# Patient Record
Sex: FEMALE | NOT HISPANIC OR LATINO | Employment: OTHER | URBAN - METROPOLITAN AREA
[De-identification: names, ages, dates, MRNs, and addresses within clinical notes are randomized per-mention and may not be internally consistent; named-entity substitution may affect disease eponyms.]

---

## 2024-03-31 ENCOUNTER — APPOINTMENT (OUTPATIENT)
Dept: CT IMAGING | Facility: CLINIC | Age: 64
End: 2024-03-31
Attending: PHYSICIAN ASSISTANT
Payer: COMMERCIAL

## 2024-03-31 ENCOUNTER — HOSPITAL ENCOUNTER (EMERGENCY)
Facility: CLINIC | Age: 64
Discharge: HOME | End: 2024-03-31
Attending: PHYSICIAN ASSISTANT | Admitting: PHYSICIAN ASSISTANT
Payer: COMMERCIAL

## 2024-03-31 VITALS
OXYGEN SATURATION: 98 % | RESPIRATION RATE: 18 BRPM | HEART RATE: 113 BPM | DIASTOLIC BLOOD PRESSURE: 108 MMHG | SYSTOLIC BLOOD PRESSURE: 175 MMHG | TEMPERATURE: 97.6 F | WEIGHT: 150 LBS

## 2024-03-31 DIAGNOSIS — N90.89 VULVAR LESION: ICD-10-CM

## 2024-03-31 LAB
ALBUMIN SERPL BCG-MCNC: 4.7 G/DL (ref 3.5–5.2)
ALP SERPL-CCNC: 81 U/L (ref 40–150)
ALT SERPL W P-5'-P-CCNC: 20 U/L (ref 0–50)
ANION GAP SERPL CALCULATED.3IONS-SCNC: 15 MMOL/L (ref 7–15)
AST SERPL W P-5'-P-CCNC: 30 U/L (ref 0–45)
BASOPHILS # BLD AUTO: 0.1 10E3/UL (ref 0–0.2)
BASOPHILS NFR BLD AUTO: 1 %
BILIRUB SERPL-MCNC: 0.5 MG/DL
BUN SERPL-MCNC: 12.4 MG/DL (ref 8–23)
CALCIUM SERPL-MCNC: 9.1 MG/DL (ref 8.8–10.2)
CHLORIDE SERPL-SCNC: 95 MMOL/L (ref 98–107)
CLUE CELLS: ABNORMAL
CREAT SERPL-MCNC: 0.64 MG/DL (ref 0.51–0.95)
DEPRECATED HCO3 PLAS-SCNC: 28 MMOL/L (ref 22–29)
EGFRCR SERPLBLD CKD-EPI 2021: >90 ML/MIN/1.73M2
EOSINOPHIL # BLD AUTO: 0.1 10E3/UL (ref 0–0.7)
EOSINOPHIL NFR BLD AUTO: 1 %
ERYTHROCYTE [DISTWIDTH] IN BLOOD BY AUTOMATED COUNT: 11.9 % (ref 10–15)
GLUCOSE SERPL-MCNC: 99 MG/DL (ref 70–99)
HCT VFR BLD AUTO: 45.7 % (ref 35–47)
HGB BLD-MCNC: 15.4 G/DL (ref 11.7–15.7)
IMM GRANULOCYTES # BLD: 0 10E3/UL
IMM GRANULOCYTES NFR BLD: 1 %
LYMPHOCYTES # BLD AUTO: 1.7 10E3/UL (ref 0.8–5.3)
LYMPHOCYTES NFR BLD AUTO: 22 %
MCH RBC QN AUTO: 33.2 PG (ref 26.5–33)
MCHC RBC AUTO-ENTMCNC: 33.7 G/DL (ref 31.5–36.5)
MCV RBC AUTO: 99 FL (ref 78–100)
MONOCYTES # BLD AUTO: 0.6 10E3/UL (ref 0–1.3)
MONOCYTES NFR BLD AUTO: 7 %
NEUTROPHILS # BLD AUTO: 5.3 10E3/UL (ref 1.6–8.3)
NEUTROPHILS NFR BLD AUTO: 69 %
NRBC # BLD AUTO: 0 10E3/UL
NRBC BLD AUTO-RTO: 0 /100
PLATELET # BLD AUTO: 253 10E3/UL (ref 150–450)
POTASSIUM SERPL-SCNC: 3.7 MMOL/L (ref 3.4–5.3)
PROT SERPL-MCNC: 7.9 G/DL (ref 6.4–8.3)
RBC # BLD AUTO: 4.64 10E6/UL (ref 3.8–5.2)
SODIUM SERPL-SCNC: 138 MMOL/L (ref 135–145)
TRICHOMONAS, WET PREP: ABNORMAL
WBC # BLD AUTO: 7.7 10E3/UL (ref 4–11)
WBC'S/HIGH POWER FIELD, WET PREP: ABNORMAL
YEAST, WET PREP: ABNORMAL

## 2024-03-31 PROCEDURE — 71260 CT THORAX DX C+: CPT

## 2024-03-31 PROCEDURE — 87210 SMEAR WET MOUNT SALINE/INK: CPT | Performed by: PHYSICIAN ASSISTANT

## 2024-03-31 PROCEDURE — 250N000009 HC RX 250: Performed by: PHYSICIAN ASSISTANT

## 2024-03-31 PROCEDURE — 250N000011 HC RX IP 250 OP 636: Performed by: PHYSICIAN ASSISTANT

## 2024-03-31 PROCEDURE — 85025 COMPLETE CBC W/AUTO DIFF WBC: CPT | Performed by: PHYSICIAN ASSISTANT

## 2024-03-31 PROCEDURE — 36415 COLL VENOUS BLD VENIPUNCTURE: CPT | Performed by: PHYSICIAN ASSISTANT

## 2024-03-31 PROCEDURE — 80053 COMPREHEN METABOLIC PANEL: CPT | Performed by: PHYSICIAN ASSISTANT

## 2024-03-31 PROCEDURE — 99284 EMERGENCY DEPT VISIT MOD MDM: CPT | Performed by: PHYSICIAN ASSISTANT

## 2024-03-31 PROCEDURE — 99285 EMERGENCY DEPT VISIT HI MDM: CPT | Mod: 25 | Performed by: PHYSICIAN ASSISTANT

## 2024-03-31 RX ORDER — IOPAMIDOL 755 MG/ML
500 INJECTION, SOLUTION INTRAVASCULAR ONCE
Status: COMPLETED | OUTPATIENT
Start: 2024-03-31 | End: 2024-03-31

## 2024-03-31 RX ADMIN — IOPAMIDOL 75 ML: 755 INJECTION, SOLUTION INTRAVENOUS at 12:42

## 2024-03-31 RX ADMIN — SODIUM CHLORIDE 60 ML: 9 INJECTION, SOLUTION INTRAVENOUS at 12:42

## 2024-03-31 ASSESSMENT — COLUMBIA-SUICIDE SEVERITY RATING SCALE - C-SSRS
1. IN THE PAST MONTH, HAVE YOU WISHED YOU WERE DEAD OR WISHED YOU COULD GO TO SLEEP AND NOT WAKE UP?: NO
2. HAVE YOU ACTUALLY HAD ANY THOUGHTS OF KILLING YOURSELF IN THE PAST MONTH?: NO
6. HAVE YOU EVER DONE ANYTHING, STARTED TO DO ANYTHING, OR PREPARED TO DO ANYTHING TO END YOUR LIFE?: NO

## 2024-03-31 ASSESSMENT — ACTIVITIES OF DAILY LIVING (ADL)
ADLS_ACUITY_SCORE: 35
ADLS_ACUITY_SCORE: 33

## 2024-03-31 NOTE — ED TRIAGE NOTES
Pt c/o malodorous, dark yellow vaginal discharge for about 1 month. Intermittent fevers for the past 2 weeks.   Additional reports of lump on right labia.     Denies pain with urination.      Triage Assessment (Adult)       Row Name 03/31/24 1109          Triage Assessment    Airway WDL WDL        Respiratory WDL    Respiratory WDL WDL        Cardiac WDL    Cardiac WDL X     Cardiac Rhythm ST

## 2024-03-31 NOTE — ED PROVIDER NOTES
History     Chief Complaint   Patient presents with    Vaginal Discharge    Fever       HPI  Regina Gaspar is a 63 year old female who presents to the emergency department complaining of vaginal discharge and fevers.  The patient reports she has been having foul-smelling dark yellow vaginal discharge for a month.  She denies any vaginal pain, itching, or burning.  She is also noticed she has had a lump on her right labia for a couple weeks.  She denies any pain to it but notes that sometimes it bleeds and she notices a little bit of blood on the toilet paper.  She states that she has had fevers.  Has not actually checked her temperature but she has had sweats and chills intermittently.  She is also noticed no appetite and feeling really bloated in her abdomen.  Denies any URI symptoms or nausea/vomiting.  She has not been taking anything for her symptoms.  She denies any recent sexual activity in the last several years.        Allergies:  No Known Allergies    Problem List:    There are no problems to display for this patient.       Past Medical History:    No past medical history on file.    Past Surgical History:    No past surgical history on file.    Family History:    No family history on file.    Social History:  Marital Status:  Single [1]        Medications:    No current outpatient medications on file.        Review of Systems   All other systems reviewed and are negative.          Physical Exam   BP: (!) 175/108  Pulse: 113  Temp: 97.6  F (36.4  C)  Resp: 18  Weight: 68 kg (150 lb)  SpO2: 98 %      Physical Exam  Vitals and nursing note reviewed.   Constitutional:       General: She is not in acute distress.     Appearance: She is well-developed. She is not diaphoretic.   HENT:      Head: Normocephalic and atraumatic.      Nose: Nose normal.      Mouth/Throat:      Mouth: Mucous membranes are moist.   Eyes:      Conjunctiva/sclera: Conjunctivae normal.      Pupils: Pupils are equal, round, and  reactive to light.   Cardiovascular:      Rate and Rhythm: Normal rate and regular rhythm.      Heart sounds: Normal heart sounds.   Pulmonary:      Effort: Pulmonary effort is normal. No respiratory distress.      Breath sounds: Normal breath sounds.   Abdominal:      General: Bowel sounds are normal. There is no distension.      Palpations: Abdomen is soft.      Tenderness: There is no abdominal tenderness.   Genitourinary:     Comments: Right labia minora with large 3 cm hard, friable ulcerated erythematous lesion with foul smelling scant thick green discharge noted.  Speculum exam revealed unremarkable cervix, thin white vaginal discharge.  No tenderness or obvious masses palpated on bimanual exam.  Musculoskeletal:         General: No deformity.      Cervical back: Neck supple.   Skin:     General: Skin is warm and dry.   Neurological:      General: No focal deficit present.      Mental Status: She is alert and oriented to person, place, and time. Mental status is at baseline.      Coordination: Coordination normal.   Psychiatric:         Mood and Affect: Mood normal.                   ED Course        Procedures      Results for orders placed or performed during the hospital encounter of 03/31/24 (from the past 24 hour(s))   CBC with platelets differential    Narrative    The following orders were created for panel order CBC with platelets differential.  Procedure                               Abnormality         Status                     ---------                               -----------         ------                     CBC with platelets and d...[[id number]]  Abnormal            Final result                 Please view results for these tests on the individual orders.   Comprehensive metabolic panel   Result Value Ref Range    Sodium 138 135 - 145 mmol/L    Potassium 3.7 3.4 - 5.3 mmol/L    Carbon Dioxide (CO2) 28 22 - 29 mmol/L    Anion Gap 15 7 - 15 mmol/L    Urea Nitrogen 12.4 8.0 - 23.0 mg/dL     Creatinine 0.64 0.51 - 0.95 mg/dL    GFR Estimate >90 >60 mL/min/1.73m2    Calcium 9.1 8.8 - 10.2 mg/dL    Chloride 95 (L) 98 - 107 mmol/L    Glucose 99 70 - 99 mg/dL    Alkaline Phosphatase 81 40 - 150 U/L    AST 30 0 - 45 U/L    ALT 20 0 - 50 U/L    Protein Total 7.9 6.4 - 8.3 g/dL    Albumin 4.7 3.5 - 5.2 g/dL    Bilirubin Total 0.5 <=1.2 mg/dL   Wet prep    Specimen: Vagina; Swab   Result Value Ref Range    Trichomonas Absent Absent    Yeast Absent Absent    Clue Cells Absent Absent    WBCs/high power field 3+ (A) None   CBC with platelets and differential   Result Value Ref Range    WBC Count 7.7 4.0 - 11.0 10e3/uL    RBC Count 4.64 3.80 - 5.20 10e6/uL    Hemoglobin 15.4 11.7 - 15.7 g/dL    Hematocrit 45.7 35.0 - 47.0 %    MCV 99 78 - 100 fL    MCH 33.2 (H) 26.5 - 33.0 pg    MCHC 33.7 31.5 - 36.5 g/dL    RDW 11.9 10.0 - 15.0 %    Platelet Count 253 150 - 450 10e3/uL    % Neutrophils 69 %    % Lymphocytes 22 %    % Monocytes 7 %    % Eosinophils 1 %    % Basophils 1 %    % Immature Granulocytes 1 %    NRBCs per 100 WBC 0 <1 /100    Absolute Neutrophils 5.3 1.6 - 8.3 10e3/uL    Absolute Lymphocytes 1.7 0.8 - 5.3 10e3/uL    Absolute Monocytes 0.6 0.0 - 1.3 10e3/uL    Absolute Eosinophils 0.1 0.0 - 0.7 10e3/uL    Absolute Basophils 0.1 0.0 - 0.2 10e3/uL    Absolute Immature Granulocytes 0.0 <=0.4 10e3/uL    Absolute NRBCs 0.0 10e3/uL   CT Chest/Abdomen/Pelvis w Contrast    Narrative    EXAM: CT CHEST/ABDOMEN/PELVIS W CONTRAST  LOCATION: [facility]  DATE: 3/31/2024    INDICATION: bloating, chills, anorexia, vulvar mass, evaluate for malignancy  COMPARISON: None.  TECHNIQUE: CT scan of the chest, abdomen, and pelvis was performed following injection of IV contrast. Multiplanar reformats were obtained. Dose reduction techniques were used.   CONTRAST: 75mL, Isovue 370    FINDINGS:   LUNGS AND PLEURA: No focal consolidation or effusion.    MEDIASTINUM/AXILLAE: Heart is normal in size. No  mediastinal, axillary, or hilar adenopathy.    CORONARY ARTERY CALCIFICATION: None.    HEPATOBILIARY: Small cyst within the central liver which are benign and needs no further follow-up. Liver otherwise unremarkable. Gallbladder within normal limits.     PANCREAS: Normal.    SPLEEN: Normal.    ADRENAL GLANDS: Normal.    KIDNEYS/BLADDER: No significant mass, stone, or hydronephrosis.    BOWEL: Diverticulosis of the colon. No acute inflammatory change. No obstruction.     LYMPH NODES: Normal.    VASCULATURE: Mild atherosclerotic disease of the abdominal aorta and its branches.    PELVIC ORGANS: 2.3 x 2.3 cm low-density lymph node or possible small fluid collection noted within the right groin. Bladder within normal limits. Uterus within normal limits.    MUSCULOSKELETAL: Degenerative changes of the spine.      Impression    IMPRESSION:  1.  2.3 x 2.3 cm low-density lymph node or possible small fluid collection noted within the right groin. Could consider ultrasound and/or biopsy as clinically indicated.   2.  No other evidence of malignancy within the chest, abdomen, and pelvis.         Medications   Saline 100mL Bag (60 mLs Intravenous $Given 3/31/24 1242)   iopamidol (ISOVUE-370) solution 500 mL (75 mLs Intravenous $Given 3/31/24 1242)         Assessments & Plan (with Medical Decision Making)  Regina Gaspar is a 63 year old female who presented to the ED for concerns of vaginal discharge and fevers. Reports one month of foul-smelling discharge and intermittent chills.  Also noted vaginal lesion for the last couple weeks.  On arrival she was afebrile.  Tachycardic and hypertensive.  Did not appear acutely ill or toxic.  Pelvic exam demonstrated a large, hard friable mass as noted above and photos with scant thick green discharge that was malodorous.  Thin white discharge noted in vaginal vault but rest of exam unremarkable, no significant tenderness on pelvic bimanual exam or abdominal exam.  Lesion is concerning  for possible malignancy.  Discussed case with Dr. Dietz and decision made to obtain CT imaging to evaluate for any other potential malignancy findings.  A wet prep was collected and was negative for BV or candidiasis.  Blood work unremarkable.  Chest/abdomen/pelvis CT showed an enlarged lymph node in the right groin but otherwise no other evidence of malignancy or any other abnormal findings.  I went over these results with the patient.  No overt signs of infection at this time.  She has had no documented fevers but rather just feeling chilled with hot flashes so I do not think she has an acute infection.  I do think she will require close follow-up regarding this vaginal lesion for biopsy and further management.  A referral to gynecology was placed in order to do this.  She was provided instructions on when to return to the ED but otherwise was medically stable and discharged at this time.     I have reviewed the nursing notes.    I have reviewed the findings, diagnosis, plan and need for follow up with the patient.    There are no discharge medications for this patient.      Final diagnoses:   Vulvar lesion     Note: Chart documentation done in part with Dragon Voice Recognition software. Although reviewed after completion, some word and grammatical errors may remain.    3/31/2024   [facility] EMERGENCY DEPT       Isha Inman PA-C  03/31/24 9460

## 2024-03-31 NOTE — DISCHARGE INSTRUCTIONS
Your testing did not show any signs of infection.  I do think you need to have that lesion looked at a little closer with a gynecologist so a referral was placed and they should call to make an appointment for you.  Please try to make this in the next week or so.  If you do have any worsening symptoms in the meantime please return to the emergency department.    Thank you for choosing [facility]'s Emergency Department. It was a pleasure taking care of you today. If you have any questions, please call [phone].    Isha Inman PA-C

## 2024-04-02 ENCOUNTER — TELEPHONE (OUTPATIENT)
Dept: OBGYN | Facility: CLINIC | Age: 64
End: 2024-04-02
Payer: COMMERCIAL

## 2024-04-02 NOTE — TELEPHONE ENCOUNTER
----- Message from Carlos Sharma sent at 7/7/2021  8:42 AM EDT -----  Subject: Appointment Request    Reason for Call: Immediate Return from RN Triage    QUESTIONS  Type of Appointment? Established Patient  Reason for appointment request? No appointments available during search  Additional Information for Provider? The patient was triaged by Nurse   Access with a Disposition to be seen today. Patient is having headache   with sore throat and ear pain with mild to moderate body aches and fever. No available appointments. Please call the patient. Pt is okay with VV  ---------------------------------------------------------------------------  --------------  CALL BACK INFO  What is the best way for the office to contact you? OK to leave message on   voicemail  Preferred Call Back Phone Number? [phone]  ---------------------------------------------------------------------------  --------------  SCRIPT ANSWERS  Patient needs to be seen today? Yes  Nurse Name? Theresa Milner  Have you been diagnosed with, awaiting test results for, or told that you   are suspected of having COVID-19 (Coronavirus)? (If patient has tested   negative or was tested as a requirement for work, school, or travel and   not based on symptoms, answer no)? No  Do you currently have flu-like symptoms including fever or chills, cough,   shortness of breath, difficulty breathing, or new loss of taste or smell? No  Have you had close contact with someone with COVID-19 in the last 14 days? No  (Service Expert  click yes below to proceed with [employer]ual   Scheduling)?  Yes RN called and LM relaying appt scheduled on 4/5/2024 and for pt to call back to confirm appt.    Tiff Higuera RN on 4/2/2024 at 9:51 AM

## 2024-04-02 NOTE — TELEPHONE ENCOUNTER
I reviewed the images and the CT results.  Patient should have this biopsied soon.  I recommend she be worked into my schedule.    Carolina Hernandez, DO

## 2024-04-02 NOTE — TELEPHONE ENCOUNTER
This encounter is being sent to inform the clinic that this patient has a referral from Isha Inman PA-C for the diagnoses of Vulvar lesion and has requested that this patient be seen within Urgent: 3-5 Days and/or with OB/GYN.  Based on the availability of our provider(s), we are unable to accommodate this request.    Were all sites offered this patient?  Yes    Does scheduling algorithm request to schedule next available?  Patient has been scheduled for the first available opening with Carolina Hernandez on 04/19/24.  We have informed the patient that the clinic will review their referral and reach out if a sooner appointment is medically necessary.          Patient only wants to go to [place] or [place] due to not having a way to get around to other locations further from home. Other locations were offered. Patient added to waitlist

## 2024-04-05 ENCOUNTER — OFFICE VISIT (OUTPATIENT)
Dept: OBGYN | Facility: OTHER | Age: 64
End: 2024-04-05
Payer: COMMERCIAL

## 2024-04-05 VITALS — WEIGHT: 151 LBS | DIASTOLIC BLOOD PRESSURE: 103 MMHG | SYSTOLIC BLOOD PRESSURE: 145 MMHG

## 2024-04-05 DIAGNOSIS — G47.00 INSOMNIA, UNSPECIFIED TYPE: ICD-10-CM

## 2024-04-05 DIAGNOSIS — N90.89 VULVAR LESION: Primary | ICD-10-CM

## 2024-04-05 PROCEDURE — 87624 HPV HI-RISK TYP POOLED RSLT: CPT | Performed by: OBSTETRICS & GYNECOLOGY

## 2024-04-05 PROCEDURE — 56606 BIOPSY OF VULVA/PERINEUM: CPT | Performed by: OBSTETRICS & GYNECOLOGY

## 2024-04-05 PROCEDURE — 88341 IMHCHEM/IMCYTCHM EA ADD ANTB: CPT | Performed by: PATHOLOGY

## 2024-04-05 PROCEDURE — 56605 BIOPSY OF VULVA/PERINEUM: CPT | Performed by: OBSTETRICS & GYNECOLOGY

## 2024-04-05 PROCEDURE — 88342 IMHCHEM/IMCYTCHM 1ST ANTB: CPT | Performed by: PATHOLOGY

## 2024-04-05 PROCEDURE — 88305 TISSUE EXAM BY PATHOLOGIST: CPT | Performed by: PATHOLOGY

## 2024-04-05 PROCEDURE — G0145 SCR C/V CYTO,THINLAYER,RESCR: HCPCS | Performed by: OBSTETRICS & GYNECOLOGY

## 2024-04-05 PROCEDURE — 99204 OFFICE O/P NEW MOD 45 MIN: CPT | Performed by: OBSTETRICS & GYNECOLOGY

## 2024-04-05 RX ORDER — TRAZODONE HYDROCHLORIDE 50 MG/1
50 TABLET, FILM COATED ORAL AT BEDTIME
Qty: 30 TABLET | Refills: 0 | Status: SHIPPED | OUTPATIENT
Start: 2024-04-05 | End: 2024-04-25

## 2024-04-05 NOTE — PROGRESS NOTES
Subjective  63 year old non-pregnant female presents today complaining of a vulvar lesion.  Patient was seen in an emergency department on 3/31 with vaginal discharge and a fever.  Patient has noticed a vulvar lesion for approximately 2 months.  It will occasionally bleed.  She states it is bleeding now.  She denies any pain with this lesion.  No problems sitting.  She has no problems urinating.  Normal bowel movements.  No fevers or chills now.  Is not sexually active.  1 .  No back pain out of the norm.  She does have early satiety and notices that this has been going on for the last few weeks.  She is a longtime smoker.  She does not think she is up-to-date on her Pap smear but thinks it was done in the emergency department.  Reviewing her record shows it was just a wet prep that was done in the ER.        I personally reviewed the CT and the findings showed a lymph node or fluid density in right groin.      I also reviewed notes from previous office visits by BRAYAN Rose.    ROS: 10 point ROS neg other than the symptoms noted above in the HPI.  History reviewed. No pertinent past medical history.  History reviewed. No pertinent surgical history.  Family History   Problem Relation Age of Onset    Breast Cancer Mother         has had a couple times     Social History     Tobacco Use    Smoking status: Every Day     Types: Cigarettes    Smokeless tobacco: Never   Substance Use Topics    Alcohol use: Yes         Objective  Vitals: BP (!) 145/103 (BP Location: Right arm, Cuff Size: Adult Regular)   Wt 68.5 kg (151 lb)   LMP  (LMP Unknown)   BMI= There is no height or weight on file to calculate BMI.    General appearance=well developed, well-nourished female  Gait=normal  Psych=mood is stable, alert and oriented x3  PELVIC:    External genitalia: very large approximate 5cm mass on patient's right labia, friable, non tender to palpation, irregular boarders, thick tissue  Urethral meatus: no lesions  or prolapse noted, normal size  Urethra: no masses, non tender  Bladder: non tender, no fullness  Vagina: normal mucosa and rugae, no discharge.  Cervix: normal without lesion, no cervical motion tenderness, healthy, multiparous, pap smear obtained  Uterus: small, mobile, nontender.  Adnexa: non tender, without masses  Rectal: deffered  Ext=no clubbing or cyanosis, no swelling      CT Chest and Abdomen=3/31/2024:  FINDINGS:   LUNGS AND PLEURA: No focal consolidation or effusion.     MEDIASTINUM/AXILLAE: Heart is normal in size. No mediastinal, axillary, or hilar adenopathy.     CORONARY ARTERY CALCIFICATION: None.     HEPATOBILIARY: Small cyst within the central liver which are benign and needs no further follow-up. Liver otherwise unremarkable. Gallbladder within normal limits.      PANCREAS: Normal.     SPLEEN: Normal.     ADRENAL GLANDS: Normal.     KIDNEYS/BLADDER: No significant mass, stone, or hydronephrosis.     BOWEL: Diverticulosis of the colon. No acute inflammatory change. No obstruction.      LYMPH NODES: Normal.     VASCULATURE: Mild atherosclerotic disease of the abdominal aorta and its branches.     PELVIC ORGANS: 2.3 x 2.3 cm low-density lymph node or possible small fluid collection noted within the right groin. Bladder within normal limits. Uterus within normal limits.     MUSCULOSKELETAL: Degenerative changes of the spine.                                                                      IMPRESSION:  1.  2.3 x 2.3 cm low-density lymph node or possible small fluid collection noted within the right groin. Could consider ultrasound and/or biopsy as clinically indicated.   2.  No other evidence of malignancy within the chest, abdomen, and pelvis.      Procedure:  An area of the external genitalia was identified for biopsy located in the right portion of the labia majora.  The skin was cleansed with betadine and the region infiltrated with 1% lidocaine with epinephrine.  Utilizing a [employer]er biopsy  forceps, two representative areas of tissue were excised and sent to pathology.  Hemostasis was achieved with suture with 3-0vicryl.         Assessment  1.)  Large right labial mass concerning for malignancy  2.)  Cervical cancer screening  3.)  Insomnia      Plan  1.)  Vulvar biopsy  2.)  Pap smear  3.)  Prescription given, follow up with FP      One undiagnosed new problem with uncertain prognosis, interpretation of CT findings ordered by a different provider, and procedure performed.    I discussed with patient my concern for malignancy and the steps that will be needed following the pathology results.  Nursing notes read and reviewed    Carolina Hernandez DO

## 2024-04-08 PROBLEM — N90.89 VULVAR LESION: Status: ACTIVE | Noted: 2024-04-08

## 2024-04-08 PROBLEM — G47.00 INSOMNIA, UNSPECIFIED TYPE: Status: ACTIVE | Noted: 2024-04-08

## 2024-04-08 NOTE — PATIENT INSTRUCTIONS
Please call if you any questions.    [address]   [address]  [phone]        Carolina Hernandez,

## 2024-04-09 LAB
BKR LAB AP GYN ADEQUACY: NORMAL
BKR LAB AP GYN INTERPRETATION: NORMAL
BKR LAB AP HPV REFLEX: NORMAL
BKR LAB AP PREVIOUS ABNORMAL: NORMAL
PATH REPORT.COMMENTS IMP SPEC: NORMAL
PATH REPORT.COMMENTS IMP SPEC: NORMAL
PATH REPORT.RELEVANT HX SPEC: NORMAL

## 2024-04-10 LAB
PATH REPORT.COMMENTS IMP SPEC: ABNORMAL
PATH REPORT.COMMENTS IMP SPEC: ABNORMAL
PATH REPORT.COMMENTS IMP SPEC: YES
PATH REPORT.FINAL DX SPEC: ABNORMAL
PATH REPORT.GROSS SPEC: ABNORMAL
PATH REPORT.MICROSCOPIC SPEC OTHER STN: ABNORMAL
PATH REPORT.RELEVANT HX SPEC: ABNORMAL
PHOTO IMAGE: ABNORMAL

## 2024-04-11 ENCOUNTER — PATIENT OUTREACH (OUTPATIENT)
Dept: ONCOLOGY | Facility: CLINIC | Age: 64
End: 2024-04-11
Payer: COMMERCIAL

## 2024-04-11 ENCOUNTER — TELEPHONE (OUTPATIENT)
Dept: OBGYN | Facility: CLINIC | Age: 64
End: 2024-04-11
Payer: COMMERCIAL

## 2024-04-11 DIAGNOSIS — C51.9 MALIGNANT NEOPLASM OF VULVA (H): Primary | ICD-10-CM

## 2024-04-11 NOTE — PROGRESS NOTES
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  2.3 x 2.3 cm low-density lymph node or possible small fluid collection noted within the right groin. Could consider ultrasound and/or biopsy as clinically indicated.   2.  No other evidence of malignancy within the chest, abdomen, and pelvis.-- BOOKMARKED  4/5/24 Office Visit with OBGYN -- BOOKMARKED      Clinical Assessment / Barriers to Care (Per Nurse):  Pt lives in [place], [place]. Prefers [facility] location. Declined sooner appointment options at alternative locations. Will schedule her in next [facility] opening and NN will watch for cancellations at [facility] and alert pt if sooner appointment becomes available.      Records Location: [facility]     Records Needed:   N/A    Additional testing needed prior to consult:   N/A    Referral updates and Plan:   OUTGOING CALL to pt:  Introduced my role as nurse [facility]igator [facility] and that we have recd the referral for dx of vulvar cancer from Dr Hernandez.  Pt confirms they are aware of the referral and ready to schedule. She would like [facility] location and declined sooner appointment elsewhere.     Provided contact information if future questions arise.     -Transferred pt to NPS line [phone] to schedule appt per scheduling instructions.    Danni  Jaimie, BSN, RN, PHN, OCN  Hematology/Oncology Nurse Navigator  [facility]  [phone]

## 2024-04-11 NOTE — TELEPHONE ENCOUNTER
Dr. Hernandez placed a gyn/onc referral for pt and wants to make sure pt gets scheduled with them.    I let Dr. Hernandez know that gyn/onc is very good about scheduling pt's with them once they get the referral but I will keep an eye out to make sure she gets scheduled.    Madhuri Loza, RN

## 2024-04-11 NOTE — TELEPHONE ENCOUNTER
Received message from gyn/onc nurse navigator letting us know that she has contacted pt and scheduled her to see a gyn/onc provider on 4/25.  Sooner appts were offered but pt declined.    Madhuri Loza RN

## 2024-04-12 PROBLEM — R87.810 CERVICAL HIGH RISK HPV (HUMAN PAPILLOMAVIRUS) TEST POSITIVE: Status: ACTIVE | Noted: 2024-04-05

## 2024-04-12 LAB
HUMAN PAPILLOMA VIRUS 16 DNA: POSITIVE
HUMAN PAPILLOMA VIRUS 18 DNA: NEGATIVE
HUMAN PAPILLOMA VIRUS FINAL DIAGNOSIS: ABNORMAL
HUMAN PAPILLOMA VIRUS OTHER HR: NEGATIVE

## 2024-04-14 NOTE — TELEPHONE ENCOUNTER
RECORDS STATUS - ALL OTHER DIAGNOSIS      RECORDS RECEIVED FROM: EPic   DATE RECEIVED:    NOTES STATUS DETAILS   OFFICE NOTE from referring provider Epic 04/05/24: Dr. Carolina Hernandez   DISCHARGE REPORT from the ER [facility] 03/31/24: [facility] ED   OPERATIVE REPORT     MEDICATION LIST [facility]    LABS     PATHOLOGY REPORTS Reports in [facility] Surg Path:  04/05/24: [id number]    PAP & HPV: 04/05/24   ANYTHING RELATED TO DIAGNOSIS Epic    IMAGING (NEED IMAGES & REPORT)     CT SCANS PACS 03/31/24: CT CAP

## 2024-04-15 ENCOUNTER — PATIENT OUTREACH (OUTPATIENT)
Dept: OBGYN | Facility: OTHER | Age: 64
End: 2024-04-15
Payer: COMMERCIAL

## 2024-04-19 ENCOUNTER — TELEPHONE (OUTPATIENT)
Dept: OBGYN | Facility: CLINIC | Age: 64
End: 2024-04-19

## 2024-04-19 NOTE — TELEPHONE ENCOUNTER
"Spoke with patient about appointment - she is not needing appointment today and is okay to cancel.    She is requesting a letter with \"what type of cancer she has\". Once written, I can mail. Address confirmed.  Kellie Muse CMA 4/19/2024 11:15 AM  "

## 2024-04-19 NOTE — LETTER
Regina,            Here is a copy of your pathology results.      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  I see you have an upcoming appointment with Gyn/Onc so they will discuss this with you further.  Please reach out if you have any questions or concerns for me.  Take care!    ~Carolina Hernandez, DO

## 2024-04-19 NOTE — TELEPHONE ENCOUNTER
Left message for patient to call back. Please see message below.  Patient is on Dr. Hernandez's schedule today for vulvar lesion. Dr. Hernandez states that if she doesn't have concerns, she doesn't need to follow up with Dr. Hernandez and okay to follow up with Gyn/Onc on 4/25 as scheduled.    If she is having concerns, please clarify and keep appointment.  Kellie Muse, CMA

## 2024-04-25 ENCOUNTER — ONCOLOGY VISIT (OUTPATIENT)
Dept: ONCOLOGY | Facility: CLINIC | Age: 64
End: 2024-04-25
Attending: OBSTETRICS & GYNECOLOGY
Payer: COMMERCIAL

## 2024-04-25 ENCOUNTER — PRE VISIT (OUTPATIENT)
Dept: ONCOLOGY | Facility: CLINIC | Age: 64
End: 2024-04-25
Payer: COMMERCIAL

## 2024-04-25 ENCOUNTER — PATIENT OUTREACH (OUTPATIENT)
Dept: ONCOLOGY | Facility: CLINIC | Age: 64
End: 2024-04-25

## 2024-04-25 VITALS
RESPIRATION RATE: 18 BRPM | HEART RATE: 119 BPM | HEIGHT: 68 IN | BODY MASS INDEX: 22.76 KG/M2 | WEIGHT: 150.2 LBS | OXYGEN SATURATION: 95 % | DIASTOLIC BLOOD PRESSURE: 79 MMHG | SYSTOLIC BLOOD PRESSURE: 151 MMHG

## 2024-04-25 DIAGNOSIS — C51.9 MALIGNANT NEOPLASM OF VULVA (H): ICD-10-CM

## 2024-04-25 DIAGNOSIS — G47.00 INSOMNIA, UNSPECIFIED TYPE: ICD-10-CM

## 2024-04-25 PROCEDURE — G0463 HOSPITAL OUTPT CLINIC VISIT: HCPCS | Performed by: OBSTETRICS & GYNECOLOGY

## 2024-04-25 PROCEDURE — 99205 OFFICE O/P NEW HI 60 MIN: CPT | Performed by: OBSTETRICS & GYNECOLOGY

## 2024-04-25 PROCEDURE — 2894A EKG 12-LEAD, TRACING ONLY: CPT | Performed by: OBSTETRICS & GYNECOLOGY

## 2024-04-25 RX ORDER — TRAZODONE HYDROCHLORIDE 50 MG/1
50-100 TABLET, FILM COATED ORAL AT BEDTIME
Qty: 60 TABLET | Refills: 1 | Status: SHIPPED | OUTPATIENT
Start: 2024-04-25 | End: 2024-07-16

## 2024-04-25 RX ORDER — METRONIDAZOLE 500 MG/100ML
500 INJECTION, SOLUTION INTRAVENOUS
Status: CANCELLED | OUTPATIENT
Start: 2024-04-25

## 2024-04-25 RX ORDER — HEPARIN SODIUM 10000 [USP'U]/ML
5000 INJECTION, SOLUTION INTRAVENOUS; SUBCUTANEOUS
Status: CANCELLED | OUTPATIENT
Start: 2024-04-25

## 2024-04-25 ASSESSMENT — PAIN SCALES - GENERAL: PAINLEVEL: NO PAIN (0)

## 2024-04-25 NOTE — PROGRESS NOTES
[facility]: Cancer Care Initial Note                                    Discussion with Patient:                                                      RN met with patient and son, Tiago, in clinic this morning, following patient's consult appointment with Dr. Roche.  Introduced self and role as RN Care Coordinator for Dr. Roche.      Reviewed plan for surgery.  Provided surgery education, folder, and handouts.  Also provided patient with list of important phone numbers for our clinic, including scheduling, triage, after-hours, and direct phone number for this RN.  Patient was also provided with a bottle of Hibiclens soap, and a janie-bottle to take home.    Surgery orders were not available in patient's chart when writer met with patient and son, thus patient will need to sign consents on the day of surgery.     Assessment:                                                      Initial  Current living arrangement:: I live alone  Informal Support system:: Children;Family  Equipment Currently Used at Home: none  Bed or wheelchair confined:: No  Mobility Status: Independent  Transportation means:: Regular car;Family  Medication adherence problem (GOAL):: No  Knowledgeable about how to use meds:: Yes  Medication side effects suspected:: No  Referrals Placed: None  Advanced Care Plans/Directives on file:: No  Advanced Care Plan/Directive Status: Considering Options    Pre/Post Procedure:   Surgery/Procedure plan reviewed with patient  Planned Surgery or Procedure: Radical excision of right vulvar lesion.  Removal of lymph nodes from left and right groin.  Possible scraping or biopsies of cervix.  Possible flap for closure of vulvar defect.  Anesthesia Type: general anesthesia  Relevant Diagnosis: Vulvar cancer  Preoperative Surgical Consult Date: 04/25/24  Pre-Op Physical to be completed by (e.g.: PCP, Pre-Assessment Center, etc.):: Surgeon  Post-op Appointment Date:  (TBD - appointment will be scheduled  "once a surgery date has been confirmed.)     Education  Person Taught: patient;family member/friend  Learning Readiness and Ability: no barriers identified  Pre-op Care Instructions: proper use of medications, when to take, and when to hold;when to call provider;pain management  Pre-op Infection Prevention Reviewed: pre-op CHG bathing instructions;bathing care after procedure  Pre-op Planning Reviewed:  arrangements, if indicated;how to get to procedure location;post-op support plan (\"who will help care for you after your surgery/procedure?\")  Pre-op Education/Instructions provided: how to prepare for surgery;what to expect on surgery day;surgery location specifics (map, parking, phone number);showering before surgery;eating before and after surgery  Post-op Care Instructions: proper use of medications, when to take, and when to hold;when to call provider;home care/follow-up care;pain management;nausea management;diet;bowel management;bladder management;sexual activity restriction;physical activity restriction;incision care/wound management  Education Outcome Evaluation: acceptance expressed    Pre-op Checklist Reviewed  Labs: n/a (Not needed per Dr. Roche, patient just had labs done 3/31/24.)  Chest X-Ray: n/a  EKG: completed  Anticoagulation plan: n/a  Bowel Prep: n/a  Other (see comment): needs scheduling (PET scan needed before surgery)     Plan:                                                       EKG will be completed today.  Instructed patient to stop at our scheduling desk prior to leaving clinic to set up her PET scan appointment.  Patient aware this needs to be completed prior to her surgery.    Patient aware that she will be receiving a telephone call soon from our scheduling team, to schedule her surgery and post-op appointment.      Encouraged patient to reach out with any questions or concerns following today's visit.    Daquan Cramer, RN, BSN, OCN  RN Care Coordinator - Oncology  [facility] " [facility]

## 2024-04-25 NOTE — PROGRESS NOTES
"                        Consult Notes on Referred Patient      Dr. Carolina Hernandez, DO  [address]  [place] [address]       RE: Regina Gaspar  : 1960  WILFREDO: 2024    Dear Dr. Carolina Hernandez:    I had the pleasure of seeing your patient Regina Gaspar here at the Gynecologic Cancer Clinic at the [facility] on 2024.  As you know she is a very pleasant 63 year old woman with a recent diagnosis of vulvar cancer.  Given these findings she was subsequently sent to the Gynecologic Cancer Clinic for new patient consultation.     24 Was seen by Dr. Hernandez in follow-up from ED visit 3/2024, who reported the following:    \"63 year old non-pregnant female presents today complaining of a vulvar lesion.  Patient was seen in an emergency department on 3/31 with vaginal discharge and a fever.  Patient has noticed a vulvar lesion for approximately 2 months.  It will occasionally bleed.  She states it is bleeding now.  She denies any pain with this lesion.  No problems sitting.  She has no problems urinating.  Normal bowel movements.  No fevers or chills now.  Is not sexually active.  1 .  No back pain out of the norm.  She does have early satiety and notices that this has been going on for the last few weeks.  She is a longtime smoker.  She does not think she is up-to-date on her Pap smear but thinks it was done in the emergency department.  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  2.3 x 2.3 cm low-density lymph node or possible small fluid collection noted within the right groin. Could consider ultrasound and/or biopsy as clinically indicated.   2.  No other " evidence of malignancy within the chest, abdomen, and pelvis.    24 PAP NILM, HPV 16+    Review of Systems:    Systemic           no weight changes; no fever; no chills; no night sweats; no appetite changes  Skin           no rashes, or lesions  Eye           no irritation; no changes in vision  Aly-Laryngeal           no dysphagia; no hoarseness   Pulmonary    no cough; no shortness of breath  Cardiovascular    no chest pain; no palpitations  Gastrointestinal    no diarrhea; no constipation; no abdominal pain; no changes in bowel  habits; no blood in stool  Genitourinary   no urinary frequency; no urinary urgency; no dysuria; no pain; no abnormal vaginal discharge; no abnormal vaginal bleeding  Breast   no breast discharge; no breast changes; no breast pain  Musculoskeletal    no myalgias; no arthralgias; no back pain  Psychiatric           no depressed mood; no anxiety    Hematologic           no tender lymph nodes; no noticeable swellings or lumps   Endocrine    no hot flashes; no heat/cold intolerance         Neurological   no tremor; no numbness and tingling; no headaches; no difficulty  sleeping      Past Medical History:    No past medical history on file. - NOt a regular attender of medical care        Past Surgical History:    Past Surgical History:   Procedure Laterality Date     SECTION             Health Maintenance:  Health Maintenance Due   Topic Date Due    NICOTINE/TOBACCO CESSATION COUNSELING Q 1 YR  Never done    YEARLY PREVENTIVE VISIT  Never done    ADVANCE CARE PLANNING  Never done    MAMMO SCREENING  Never done    Pneumococcal Vaccine: Pediatrics (0 to 5 Years) and At-Risk Patients (6 to 64 Years) (1 of 2 - PCV) Never done    COLORECTAL CANCER SCREENING  Never done    HIV SCREENING  Never done    HEPATITIS C SCREENING  Never done    LIPID  Never done    ZOSTER IMMUNIZATION (1 of 2) Never done    RSV VACCINE (Pregnancy & 60+) (1 - 1-dose 60+ series) Never done    INFLUENZA VACCINE  "(1) 09/01/2023    COVID-19 Vaccine (3 - 2023-24 season) 09/01/2023    PHQ-2 (once per calendar year)  Never done    COLPOSCOPY  07/05/2024       Last Pap Smear: 2024 NILM HPV 16+; prior (remote)    Last Mammogram: none    Last Colonoscopy: Never                            Current Medications:     has a current medication list which includes the following prescription(s): trazodone.       Allergies:     Patient has no known allergies.        Social History:     Social History     Tobacco Use    Smoking status: Every Day     Types: Cigarettes    Smokeless tobacco: Never   Substance Use Topics    Alcohol use: Yes       History   Drug Use Unknown           Family History:     Family History   Problem Relation Age of Onset    Breast Cancer Mother         has had a couple times    Colon Cancer Father 80    Uterine Cancer No family hx of              Physical Exam:     PS 0  VS: BP (!) 151/79 (BP Location: Right arm)   Pulse 119   Resp 18   Ht 1.727 m (5' 8\")   Wt 68.1 kg (150 lb 3.2 oz)   LMP  (LMP Unknown)   SpO2 95%   BMI 22.84 kg/m       General Appearance: healthy and alert, anxious and reasonably upset     HEENT:  no thyromegaly, no palpable nodules or masses        Cardiovascular: regular rate and rhythm, tachycardic (but overly anxious), no gallops, rubs or murmurs     Respiratory: lungs clear, no rales, rhonchi or wheezes, normal diaphragmatic excursion    Musculoskeletal: extremities non tender and without edema    Skin: no lesions or rashes     Neurological: normal gait, no gross defects     Psychiatric: appropriate mood and affect                               Hematological: normal cervical, supraclavicular and inguinal lymph nodes     Gastrointestinal:       abdomen soft, non-tender, non-distended, no organomegaly or masses    Genitourinary: External genitalia is notable for a 4x4 cm exophytic lesion in the right mid vulva.  There is extent ion medially - but there is a gap of normal tissue prior to the " urethra..  Vagina is smooth without nodularity or masses.  Cervix appears normal and without lesions.  (Colposopcy and biopsies were deferred despite the HPV 16 diagnosis given the patient's anxiety - we will plan for biopsies at surgery)  There is a right sided medial inguinal lymph node, which is enlarged but minimally mobile/non-tender.  Bimanual exam reveal no masses, nodularity or fullness.        Assessment:    Regina Gaspar is a 63 year old woman with a new diagnosis of vulvar cancer.     A total of 60 minutes was spent with the patient, 40 minutes of which were spent in counseling the patient and/or treatment planning.      Plan:     1.)   Vulvar cancer: We have discussed the clinical, pathologic, and radiologic findings.  I have recommended a PET scan to R/O additional mets, followed by radical vulvectomy, right inguinal lymphadenectomy and left sentinel LN biopsy.  She understands that the defect will be significant and may involve some or all of her distal urethra.  She may require a flap for closure, and will likely require adjuvant treatment with ERT if the node is positive.     2.) Genetic risk factors were assessed and the patient does not meet the qualifications for a referral.      3.) Labs and/or tests ordered include:  PET, pre-op l;abs, ecg     4.) Health maintenance issues addressed today include none.    5.) Pre-op teaching was completed today.  Risks of surgery were discussed to include: bleeding, transfusion, infection, unintentional injury to surrounding organs/structures.      Thank you for allowing us to participate in the care of your patient.         Sincerely,    Javier Roche MD    CC  Patient Care Team:  No Ref-Primary, Physician as PCP - General  Odell Hernandez DO as Physician (OB/Gyn)  Javier Roche MD as MD (Gynecologic Oncology)  Odell Hernandez DO as Assigned OBGYN Provider  ODELL HERNANDEZ

## 2024-04-25 NOTE — LETTER
"    2024         RE: Regina Gaspar  [address]  [facility] [address]        Dear Colleague,    Thank you for referring your patient, Regina Gaspar, to the [facility]. Please see a copy of my visit note below.                            Consult Notes on Referred Patient      Dr. Carolina Hernandez, DO  [address]  [place] [address]       RE: Regina Gaspar  : 1960  WILFREDO: 2024    Dear Dr. Carolina Hernandez:    I had the pleasure of seeing your patient Regina Gaspar here at the Gynecologic Cancer Clinic at the [facility] on 2024.  As you know she is a very pleasant 63 year old woman with a recent diagnosis of vulvar cancer.  Given these findings she was subsequently sent to the Gynecologic Cancer Clinic for new patient consultation.     24 Was seen by Dr. Hernandez in follow-up from ED visit 3/2024, who reported the following:    \"63 year old non-pregnant female presents today complaining of a vulvar lesion.  Patient was seen in an emergency department on 3/31 with vaginal discharge and a fever.  Patient has noticed a vulvar lesion for approximately 2 months.  It will occasionally bleed.  She states it is bleeding now.  She denies any pain with this lesion.  No problems sitting.  She has no problems urinating.  Normal bowel movements.  No fevers or chills now.  Is not sexually active.  1 .  No back pain out of the norm.  She does have early satiety and notices that this has been going on for the last few weeks.  She is a longtime smoker.  She does not think she is up-to-date on her Pap smear but thinks it was done in the emergency department.  Reviewing her record shows it was just a wet prep that was done in the ER.\"    Biopsy was astutely performed at that time which britney started:  PATH:    Final Diagnosis   Vulva, right, biopsy-  Well differentiated squamous cell carcinoma with " keratinization, superficially invasive, incompletely excised       3/31/24 CT:  IMPRESSION:  1.  2.3 x 2.3 cm low-density lymph node or possible small fluid collection noted within the right groin. Could consider ultrasound and/or biopsy as clinically indicated.   2.  No other evidence of malignancy within the chest, abdomen, and pelvis.    24 PAP NILM, HPV 16+    Review of Systems:    Systemic           no weight changes; no fever; no chills; no night sweats; no appetite changes  Skin           no rashes, or lesions  Eye           no irritation; no changes in vision  Aly-Laryngeal           no dysphagia; no hoarseness   Pulmonary    no cough; no shortness of breath  Cardiovascular    no chest pain; no palpitations  Gastrointestinal    no diarrhea; no constipation; no abdominal pain; no changes in bowel  habits; no blood in stool  Genitourinary   no urinary frequency; no urinary urgency; no dysuria; no pain; no abnormal vaginal discharge; no abnormal vaginal bleeding  Breast   no breast discharge; no breast changes; no breast pain  Musculoskeletal    no myalgias; no arthralgias; no back pain  Psychiatric           no depressed mood; no anxiety    Hematologic           no tender lymph nodes; no noticeable swellings or lumps   Endocrine    no hot flashes; no heat/cold intolerance         Neurological   no tremor; no numbness and tingling; no headaches; no difficulty  sleeping      Past Medical History:    No past medical history on file. - NOt a regular attender of medical care        Past Surgical History:    Past Surgical History:   Procedure Laterality Date      SECTION             Health Maintenance:  Health Maintenance Due   Topic Date Due     NICOTINE/TOBACCO CESSATION COUNSELING Q 1 YR  Never done     YEARLY PREVENTIVE VISIT  Never done     ADVANCE CARE PLANNING  Never done     MAMMO SCREENING  Never done     Pneumococcal Vaccine: Pediatrics (0 to 5 Years) and At-Risk Patients (6 to 64 Years) (1 of  "2 - PCV) Never done     COLORECTAL CANCER SCREENING  Never done     HIV SCREENING  Never done     HEPATITIS C SCREENING  Never done     LIPID  Never done     ZOSTER IMMUNIZATION (1 of 2) Never done     RSV VACCINE (Pregnancy & 60+) (1 - 1-dose 60+ series) Never done     INFLUENZA VACCINE (1) 09/01/2023     COVID-19 Vaccine (3 - 2023-24 season) 09/01/2023     PHQ-2 (once per calendar year)  Never done     COLPOSCOPY  07/05/2024       Last Pap Smear: 2024 NILM HPV 16+; prior (remote)    Last Mammogram: none    Last Colonoscopy: Never                            Current Medications:     has a current medication list which includes the following prescription(s): trazodone.       Allergies:     Patient has no known allergies.        Social History:     Social History     Tobacco Use     Smoking status: Every Day     Types: Cigarettes     Smokeless tobacco: Never   Substance Use Topics     Alcohol use: Yes       History   Drug Use Unknown           Family History:     Family History   Problem Relation Age of Onset     Breast Cancer Mother         has had a couple times     Colon Cancer Father 80     Uterine Cancer No family hx of              Physical Exam:     PS 0  VS: BP (!) 151/79 (BP Location: Right arm)   Pulse 119   Resp 18   Ht 1.727 m (5' 8\")   Wt 68.1 kg (150 lb 3.2 oz)   LMP  (LMP Unknown)   SpO2 95%   BMI 22.84 kg/m       General Appearance: healthy and alert, anxious and reasonably upset     HEENT:  no thyromegaly, no palpable nodules or masses        Cardiovascular: regular rate and rhythm, tachycardic (but overly anxious), no gallops, rubs or murmurs     Respiratory: lungs clear, no rales, rhonchi or wheezes, normal diaphragmatic excursion    Musculoskeletal: extremities non tender and without edema    Skin: no lesions or rashes     Neurological: normal gait, no gross defects     Psychiatric: appropriate mood and affect                               Hematological: normal cervical, supraclavicular and " inguinal lymph nodes     Gastrointestinal:       abdomen soft, non-tender, non-distended, no organomegaly or masses    Genitourinary: External genitalia is notable for a 4x4 cm exophytic lesion in the right mid vulva.  There is extent ion medially - but there is a gap of normal tissue prior to the urethra..  Vagina is smooth without nodularity or masses.  Cervix appears normal and without lesions.  (Colposopcy and biopsies were deferred despite the HPV 16 diagnosis given the patient's anxiety - we will plan for biopsies at surgery)  There is a right sided medial inguinal lymph node, which is enlarged but minimally mobile/non-tender.  Bimanual exam reveal no masses, nodularity or fullness.        Assessment:    Regina Gaspar is a 63 year old woman with a new diagnosis of vulvar cancer.     A total of 60 minutes was spent with the patient, 40 minutes of which were spent in counseling the patient and/or treatment planning.      Plan:     1.)   Vulvar cancer: We have discussed the clinical, pathologic, and radiologic findings.  I have recommended a PET scan to R/O additional mets, followed by radical vulvectomy, right inguinal lymphadenectomy and left sentinel LN biopsy.  She understands that the defect will be significant and may involve some or all of her distal urethra.  She may require a flap for closure, and will likely require adjuvant treatment with ERT if the node is positive.     2.) Genetic risk factors were assessed and the patient does not meet the qualifications for a referral.      3.) Labs and/or tests ordered include:  PET, pre-op l;abs, ecg     4.) Health maintenance issues addressed today include none.    5.) Pre-op teaching was completed today.  Risks of surgery were discussed to include: bleeding, transfusion, infection, unintentional injury to surrounding organs/structures.      Thank you for allowing us to participate in the care of your patient.         Sincerely,    Javier Roche,  MD [facility]  Patient Care Team:  No Ref-Primary, Physician as PCP - General  Odell Hernandez DO as Physician (OB/Gyn)  Javier Roche MD as MD (Gynecologic Oncology)  Odell Hernandez DO as Assigned OBGYN Provider  ODELL HERNANDEZ        Again, thank you for allowing me to participate in the care of your patient.        Sincerely,        Javier Roche MD

## 2024-04-25 NOTE — PATIENT INSTRUCTIONS
EKG today.  Schedule PET scan ASAP.  You will receive a telephone call to schedule your surgery with Dr. Roche.  Follow up with Dr. Roche around 1-2 weeks after surgery.    Please call us with any questions or concerns following your appointment with us today. Thank you for choosing [facility].    [facility] - Contact Information    Clinic Hours: Monday - Friday, 8:00 AM to 4:30 PM    Appointment Scheduling [phone] (option #4)   Triage Nurses (for symptom/side effect concerns, or urgent needs) [phone]   After-Hours Advice Line [phone]   Dr. Roche's Nurse, Daquan [phone]   Fax Number [phone]

## 2024-04-25 NOTE — NURSING NOTE
"Oncology Rooming Note    April 25, 2024 9:30 AM   Regina Gaspar is a 63 year old female who presents for:    Chief Complaint   Patient presents with    Oncology Clinic Visit     New patient     Initial Vitals: BP (!) 151/79 (BP Location: Right arm)   Pulse 119   Resp 18   Ht 1.727 m (5' 8\")   Wt 68.1 kg (150 lb 3.2 oz)   LMP  (LMP Unknown)   SpO2 95%   BMI 22.84 kg/m   Estimated body mass index is 22.84 kg/m  as calculated from the following:    Height as of this encounter: 1.727 m (5' 8\").    Weight as of this encounter: 68.1 kg (150 lb 3.2 oz). Body surface area is 1.81 meters squared.  No Pain (0) Comment: Data Unavailable   No LMP recorded (lmp unknown). Patient is postmenopausal.  Allergies reviewed: Yes  Medications reviewed: Yes    Medications: Medication refills not needed today.  Pharmacy name entered into [facility]: [facility] - [place], [place] - [address]    Frailty Screening:   Is the patient here for a new oncology consult visit in cancer care? 2. No      Clinical concerns: New patient       Eda Lubin LPN              "

## 2024-04-26 ENCOUNTER — TELEPHONE (OUTPATIENT)
Dept: ONCOLOGY | Facility: CLINIC | Age: 64
End: 2024-04-26
Payer: COMMERCIAL

## 2024-04-26 NOTE — TELEPHONE ENCOUNTER
Left voicemail for patient regarding scheduling surgery with Dr. Roche.  Provided contact number to discuss.  [phone]    Cal Reagan, on 4/26/2024 at 10:07 AM

## 2024-04-29 ENCOUNTER — ANCILLARY PROCEDURE (OUTPATIENT)
Dept: PET IMAGING | Facility: CLINIC | Age: 64
End: 2024-04-29
Attending: OBSTETRICS & GYNECOLOGY
Payer: COMMERCIAL

## 2024-04-29 DIAGNOSIS — C51.9 MALIGNANT NEOPLASM OF VULVA (H): ICD-10-CM

## 2024-04-29 LAB — GLUCOSE SERPL-MCNC: 71 MG/DL (ref 70–99)

## 2024-04-29 RX ORDER — FLUDEOXYGLUCOSE F 18 200 MCI/ML
8-18 INJECTION, SOLUTION INTRAVENOUS ONCE
Status: COMPLETED | OUTPATIENT
Start: 2024-04-29 | End: 2024-04-29

## 2024-04-29 RX ORDER — FUROSEMIDE 10 MG/ML
40 INJECTION INTRAMUSCULAR; INTRAVENOUS ONCE
Status: COMPLETED | OUTPATIENT
Start: 2024-04-29 | End: 2024-04-29

## 2024-04-29 RX ADMIN — FLUDEOXYGLUCOSE F 18 14.45 MILLICURIE: 200 INJECTION, SOLUTION INTRAVENOUS at 12:53

## 2024-04-29 RX ADMIN — FUROSEMIDE 40 MG: 10 INJECTION INTRAMUSCULAR; INTRAVENOUS at 13:50

## 2024-05-01 NOTE — TELEPHONE ENCOUNTER
Spoke with the patient and was able to confirm all scheduled information.     Patient is schedule for surgery with: Dr. Roche    Surgery Date: 5/29/2024     Location: [facility]    H&P: to be completed by surgeon will complete and/or update on day of surgery as needed      Post-op: will be scheduled by the clinic    Patient will receive a phone call from hospital pre-admission nurses 1-2 days prior to surgery with arrival time and NPO instructions. Patient aware times are subject to change up until day before surgery.     Patient questions/concerns: N/A     Surgery packet was provided to patient during appointment      Cal Reagan on 5/1/2024 at 10:45 AM

## 2024-05-07 NOTE — TELEPHONE ENCOUNTER
05/14/24 Surgery excision of right vulvar lesion. Removal of lymph nodes from left and right groin.  Possible scraping or biopsies of cervix.

## 2024-05-07 NOTE — TELEPHONE ENCOUNTER
Left voicemail for patient regarding rescheduling surgery with Dr. Roche.  Provided contact number to discuss.  [phone]    Cal Reagan, on 5/7/2024 at 9:24 AM

## 2024-05-08 NOTE — TELEPHONE ENCOUNTER
Left voicemail for patient regarding rescheduling surgery with Dr. Roche.  Provided contact number to discuss.  [phone]    Cal Reagan, on 5/8/2024 at 9:16 AM

## 2024-05-09 NOTE — TELEPHONE ENCOUNTER
Writer reviewed trazodone question with Dr. Roche - myranda SMITH, patient may take this the night before surgery if she wishes.  Telephone call was placed to patient this afternoon, providing this recommendation.    Advised her that our scheduling team will be calling her soon to help arrange her post-op appointment with Dr. Roche.      Patient verbalized understanding, and denied having any further questions or concerns at this time.    Daquan Cramer, RN, BSN, OCN  RN Care Coordinator - Oncology  [facility]

## 2024-05-09 NOTE — TELEPHONE ENCOUNTER
Left voicemail for patient regarding rescheduling surgery with Dr. Roche.  Provided contact number to discuss.  [phone]    Cal Reagan, on 5/9/2024 at 8:10 AM

## 2024-05-09 NOTE — TELEPHONE ENCOUNTER
Spoke with patient, confirmed all scheduled information.       Reschedule per Dr. Roche    Patient is schedule for surgery with: Dr. Roche    Surgery Date: 5/14/2024     Location: [facility]    H&P: to be completed by surgeon will complete and/or update on day of surgery as needed      Post-op: will be scheduled by the clinic    Patient will receive a phone call from hospital pre-admission nurses 1-2 days prior to surgery with arrival time and NPO instructions. Patient aware times are subject to change up until day before surgery.     Patient questions/concerns:  Patient inquired if they can take Trazodone for sleeping the days prior to surgery. Will have RNCC reach out to patient to discuss.  Patient stated a text message or phone call is preferable.     Surgery packet was provided to patient during appointment      Cal Reagan on 5/9/2024 at 12:48 PM

## 2024-05-13 NOTE — TELEPHONE ENCOUNTER
Left voicemail for patient regarding rescheduling surgery with Dr. Roche.  Provided contact number to discuss. [phone]    [Reschedule due to financial reasons.]    Cal Reagan, on 5/13/2024 at 11:44 AM

## 2024-05-13 NOTE — TELEPHONE ENCOUNTER
Called spoke with patient regarding rescheduling surgery with Dr. Roche.    Writer informed patient that surgery on 5/14/24 is cancelled and needs to be rescheduled.    Patient was unaware of financial issues that lead to cancellation/reschedule. Writer stated they will call patient back to reschedule when there is more information known about the financial issues.    Patient confirmed understanding.    Cal Reagan on 5/13/2024 at 4:05 PM

## 2024-05-14 NOTE — TELEPHONE ENCOUNTER
[From Vida Vivas, MAHSA to Cal Reagan]    Hi Cal, the patient currently has no insurance as her insurance coverage lapsed. The Financial Counselors have been trying to reach her to get her insurance active again. She needs to contact the financial counseling team to get her insurance situation squared away. There have been multiple Voicemails left for the patient, but she has not returned their call. My understanding is that she needs to apply for a renewal on her insurance.     Cal Reagan on 5/14/2024 at 8:08 AM

## 2024-05-22 ENCOUNTER — PATIENT OUTREACH (OUTPATIENT)
Dept: ONCOLOGY | Facility: CLINIC | Age: 64
End: 2024-05-22
Payer: MEDICARE

## 2024-05-22 NOTE — PROGRESS NOTES
[facility]: Cancer Care Note                                                          Received voicemail message from patient this afternoon, requesting callback to discuss scheduling her surgery with Dr. Roche.  Patient is hoping to get back on the schedule for 5/29/24, if possible.    Writer placed callback to patient this afternoon to discuss.  Patient shares update that she now has active insurance coverage (Medicare), and that she would like to get her surgery scheduling taken care of very soon.  She states she has left messages for our surgery scheduling team but writer unsure of what number she had called.    Note routed to surgery scheduling team, marked high priority, with request to call patient and assist her with rescheduling her surgery with Dr. Roche.  Patient requests a callback before end of today if at all possible, and confirms she will have her phone available.    Daquan Cramer, RN, BSN, OCN  RN Care Coordinator - Oncology  [facility]

## 2024-05-22 NOTE — TELEPHONE ENCOUNTER
Left voicemail for patient regarding scheduling surgery with Dr. Roche. Asked patient to call back with an update on insurance and to schedule.   Provided contact number to discuss.  [phone]    Yazmin Magdaleno, on 5/22/2024 at 11:08 AM

## 2024-05-23 NOTE — TELEPHONE ENCOUNTER
Left a detailed voicemail regarding the scheduled information and requested a call back to discuss. Provided direct line.    Patient is schedule for surgery with: Dr. Roche    Surgery Date: 5/28     Location: [facility]    H&P: already completed by surgeon will complete and/or update on day of surgery as needed      Post-op: will be scheduled by the clinic    Patient will receive a phone call from hospital pre-admission nurses 1-2 days prior to surgery with arrival time and NPO instructions.    Patient aware times are subject to change up until day before surgery.     Patient questions/concerns: N/A     Surgery packet sent previously       Yazmin Magdaleno on 5/23/2024 at 9:42 AM

## 2024-05-27 NOTE — PROGRESS NOTES
"                        Consult Notes on Referred Patient      Dr. Carolina Hernandez, DO  [address]  [place] [address]       RE: Regina Gaspar  : 1960  WILFREDO: 2024     Dear Dr. Carolina Hernandez:    I had the pleasure of seeing your patient Regina Gaspar here at the Gynecologic Cancer Clinic at the [facility] on 2024.  As you know she is a very pleasant 63 year old woman with a recent diagnosis of vulvar cancer.  Given these findings she was subsequently sent to the Gynecologic Cancer Clinic for new patient consultation.     24 Was seen by Dr. Hernandez in follow-up from ED visit 3/2024, who reported the following:    \"63 year old non-pregnant female presents today complaining of a vulvar lesion.  Patient was seen in an emergency department on 3/31 with vaginal discharge and a fever.  Patient has noticed a vulvar lesion for approximately 2 months.  It will occasionally bleed.  She states it is bleeding now.  She denies any pain with this lesion.  No problems sitting.  She has no problems urinating.  Normal bowel movements.  No fevers or chills now.  Is not sexually active.  1 .  No back pain out of the norm.  She does have early satiety and notices that this has been going on for the last few weeks.  She is a longtime smoker.  She does not think she is up-to-date on her Pap smear but thinks it was done in the emergency department.  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  2.3 x 2.3 cm low-density lymph node or possible small fluid collection noted within the right groin. Could consider ultrasound and/or biopsy as clinically indicated.   2.  No other " evidence of malignancy within the chest, abdomen, and pelvis.    24 PAP NILM, HPV 16+        Review of Systems:    Systemic           no weight changes; no fever; no chills; no night sweats; no appetite changes  Skin           no rashes, or lesions  Eye           no irritation; no changes in vision  Aly-Laryngeal           no dysphagia; no hoarseness   Pulmonary    no cough; no shortness of breath  Cardiovascular    no chest pain; no palpitations  Gastrointestinal    no diarrhea; no constipation; no abdominal pain; no changes in bowel  habits; no blood in stool  Genitourinary   no urinary frequency; no urinary urgency; no dysuria; no pain; no abnormal vaginal discharge; no abnormal vaginal bleeding  Breast   no breast discharge; no breast changes; no breast pain  Musculoskeletal    no myalgias; no arthralgias; no back pain  Psychiatric           no depressed mood; no anxiety    Hematologic           no tender lymph nodes; no noticeable swellings or lumps   Endocrine    no hot flashes; no heat/cold intolerance         Neurological   no tremor; no numbness and tingling; no headaches; no difficulty  sleeping      Past Medical History:    No past medical history on file. - NOt a regular attender of medical care        Past Surgical History:    Past Surgical History:   Procedure Laterality Date     SECTION             Health Maintenance:  Health Maintenance Due   Topic Date Due    NICOTINE/TOBACCO CESSATION COUNSELING Q 1 YR  Never done    DEXA  Never done    ADVANCE CARE PLANNING  Never done    MAMMO SCREENING  Never done    Pneumococcal Vaccine: 65+ Years (1 of 2 - PCV) Never done    COLORECTAL CANCER SCREENING  Never done    HIV SCREENING  Never done    HEPATITIS C SCREENING  Never done    LIPID  Never done    ZOSTER IMMUNIZATION (1 of 2) Never done    RSV VACCINE (Pregnancy & 60+) (1 - 1-dose 60+ series) Never done    COVID-19 Vaccine (3 - 2023-24 season) 2023    PHQ-2 (once per calendar year)   Never done    FALL RISK ASSESSMENT  Never done    MEDICARE ANNUAL WELLNESS VISIT  05/06/2024    COLPOSCOPY  07/05/2024       Last Pap Smear: 2024 NILM HPV 16+; prior (remote)    Last Mammogram: none    Last Colonoscopy: Never                            Current Medications:     has a current medication list which includes the following prescription(s): trazodone.       Allergies:     Patient has no known allergies.        Social History:     Social History     Tobacco Use    Smoking status: Every Day     Current packs/day: 1.00     Types: Cigarettes    Smokeless tobacco: Never    Tobacco comments:     50 pack years   Substance Use Topics    Alcohol use: Yes     Comment: quart vodka/ week       History   Drug Use Unknown           Family History:     Family History   Problem Relation Age of Onset    Breast Cancer Mother         has had a couple times    Colon Cancer Father 80    Uterine Cancer No family hx of              Physical Exam:     PS 0  VS: LMP  (LMP Unknown)      General Appearance: healthy and alert, anxious and reasonably upset     HEENT:  no thyromegaly, no palpable nodules or masses        Cardiovascular: regular rate and rhythm, tachycardic (but overly anxious), no gallops, rubs or murmurs     Respiratory: lungs clear, no rales, rhonchi or wheezes, normal diaphragmatic excursion    Musculoskeletal: extremities non tender and without edema    Skin: no lesions or rashes     Neurological: normal gait, no gross defects     Psychiatric: appropriate mood and affect                               Hematological: normal cervical, supraclavicular and inguinal lymph nodes     Gastrointestinal:       abdomen soft, non-tender, non-distended, no organomegaly or masses    Genitourinary: External genitalia is notable for a 4x4 cm exophytic lesion in the right mid vulva.  There is extent ion medially - but there is a gap of normal tissue prior to the urethra..  Vagina is smooth without nodularity or masses.  Cervix  appears normal and without lesions.  (Colposopcy and biopsies were deferred despite the HPV 16 diagnosis given the patient's anxiety - we will plan for biopsies at surgery)  There is a right sided medial inguinal lymph node, which is enlarged but minimally mobile/non-tender.  Bimanual exam reveal no masses, nodularity or fullness.        Assessment:    Regina Gaspar is a 63 year old woman with a new diagnosis of vulvar cancer.     A total of 60 minutes was spent with the patient, 40 minutes of which were spent in counseling the patient and/or treatment planning.      Plan:     1.)   Vulvar cancer: We have discussed the clinical, pathologic, and radiologic findings.  I have recommended a PET scan to R/O additional mets, followed by radical vulvectomy, right inguinal lymphadenectomy and left sentinel LN biopsy.  She understands that the defect will be significant and may involve some or all of her distal urethra.  She may require a flap for closure, and will likely require adjuvant treatment with ERT if the node is positive.     2.) Genetic risk factors were assessed and the patient does not meet the qualifications for a referral.      3.) Labs and/or tests ordered include:  PET, pre-op l;abs, ecg     4.) Health maintenance issues addressed today include none.    5.) Pre-op teaching was completed today.  Risks of surgery were discussed to include: bleeding, transfusion, infection, unintentional injury to surrounding organs/structures.      Thank you for allowing us to participate in the care of your patient.         Sincerely,    Javier Roche MD    CC  Patient Care Team:  No Ref-Primary, Physician as PCP - General  Carolina Hernandez DO as Physician (OB/Gyn)  Javier Roche MD as MD (Gynecologic Oncology)  Carolina Hernandez DO as Assigned OBGYN Provider  Daquan Cramer, RN as Specialty Care Coordinator (Hematology & Oncology)  Javier Roche MD as Assigned Cancer Care  Provider  ODELL JACKSON

## 2024-05-28 ENCOUNTER — HOSPITAL ENCOUNTER (INPATIENT)
Facility: CLINIC | Age: 64
LOS: 2 days | Discharge: HOME | DRG: 734 | End: 2024-05-30
Attending: OBSTETRICS & GYNECOLOGY | Admitting: OBSTETRICS & GYNECOLOGY
Payer: MEDICARE

## 2024-05-28 ENCOUNTER — ANESTHESIA (OUTPATIENT)
Dept: SURGERY | Facility: CLINIC | Age: 64
DRG: 734 | End: 2024-05-28
Payer: MEDICARE

## 2024-05-28 ENCOUNTER — ANESTHESIA EVENT (OUTPATIENT)
Dept: SURGERY | Facility: CLINIC | Age: 64
DRG: 734 | End: 2024-05-28
Payer: MEDICARE

## 2024-05-28 DIAGNOSIS — C51.9 VULVAR CANCER (H): Primary | ICD-10-CM

## 2024-05-28 LAB — GLUCOSE BLDC GLUCOMTR-MCNC: 82 MG/DL (ref 70–99)

## 2024-05-28 PROCEDURE — 250N000009 HC RX 250: Performed by: ANESTHESIOLOGY

## 2024-05-28 PROCEDURE — 250N000011 HC RX IP 250 OP 636: Performed by: OBSTETRICS & GYNECOLOGY

## 2024-05-28 PROCEDURE — 88309 TISSUE EXAM BY PATHOLOGIST: CPT | Mod: 26 | Performed by: STUDENT IN AN ORGANIZED HEALTH CARE EDUCATION/TRAINING PROGRAM

## 2024-05-28 PROCEDURE — 250N000013 HC RX MED GY IP 250 OP 250 PS 637: Performed by: ANESTHESIOLOGY

## 2024-05-28 PROCEDURE — 272N000001 HC OR GENERAL SUPPLY STERILE: Performed by: OBSTETRICS & GYNECOLOGY

## 2024-05-28 PROCEDURE — 88331 PATH CONSLTJ SURG 1 BLK 1SPC: CPT | Mod: 26 | Performed by: PATHOLOGY

## 2024-05-28 PROCEDURE — 07TC0ZZ RESECTION OF PELVIS LYMPHATIC, OPEN APPROACH: ICD-10-PCS | Performed by: OBSTETRICS & GYNECOLOGY

## 2024-05-28 PROCEDURE — 0UBC0ZX EXCISION OF CERVIX, OPEN APPROACH, DIAGNOSTIC: ICD-10-PCS | Performed by: OBSTETRICS & GYNECOLOGY

## 2024-05-28 PROCEDURE — 258N000003 HC RX IP 258 OP 636: Performed by: ANESTHESIOLOGY

## 2024-05-28 PROCEDURE — 88307 TISSUE EXAM BY PATHOLOGIST: CPT | Mod: 26 | Performed by: STUDENT IN AN ORGANIZED HEALTH CARE EDUCATION/TRAINING PROGRAM

## 2024-05-28 PROCEDURE — 88309 TISSUE EXAM BY PATHOLOGIST: CPT | Mod: TC | Performed by: OBSTETRICS & GYNECOLOGY

## 2024-05-28 PROCEDURE — 370N000017 HC ANESTHESIA TECHNICAL FEE, PER MIN: Performed by: OBSTETRICS & GYNECOLOGY

## 2024-05-28 PROCEDURE — 999N000141 HC STATISTIC PRE-PROCEDURE NURSING ASSESSMENT: Performed by: OBSTETRICS & GYNECOLOGY

## 2024-05-28 PROCEDURE — 250N000011 HC RX IP 250 OP 636: Performed by: ANESTHESIOLOGY

## 2024-05-28 PROCEDURE — 250N000013 HC RX MED GY IP 250 OP 250 PS 637

## 2024-05-28 PROCEDURE — 88305 TISSUE EXAM BY PATHOLOGIST: CPT | Mod: 26 | Performed by: STUDENT IN AN ORGANIZED HEALTH CARE EDUCATION/TRAINING PROGRAM

## 2024-05-28 PROCEDURE — 250N000025 HC SEVOFLURANE, PER MIN: Performed by: OBSTETRICS & GYNECOLOGY

## 2024-05-28 PROCEDURE — 710N000010 HC RECOVERY PHASE 1, LEVEL 2, PER MIN: Performed by: OBSTETRICS & GYNECOLOGY

## 2024-05-28 PROCEDURE — 56631 VLVCTMY RAD PRTL UNI LYMPHAD: CPT | Performed by: NURSE ANESTHETIST, CERTIFIED REGISTERED

## 2024-05-28 PROCEDURE — 120N000002 HC R&B MED SURG/OB UMMC

## 2024-05-28 PROCEDURE — 360N000077 HC SURGERY LEVEL 4, PER MIN: Performed by: OBSTETRICS & GYNECOLOGY

## 2024-05-28 PROCEDURE — 56631 VLVCTMY RAD PRTL UNI LYMPHAD: CPT | Performed by: ANESTHESIOLOGY

## 2024-05-28 PROCEDURE — 258N000003 HC RX IP 258 OP 636

## 2024-05-28 PROCEDURE — 0UBM0ZZ EXCISION OF VULVA, OPEN APPROACH: ICD-10-PCS | Performed by: OBSTETRICS & GYNECOLOGY

## 2024-05-28 RX ORDER — LABETALOL HYDROCHLORIDE 5 MG/ML
5 INJECTION, SOLUTION INTRAVENOUS EVERY 5 MIN PRN
Status: DISCONTINUED | OUTPATIENT
Start: 2024-05-28 | End: 2024-05-28 | Stop reason: HOSPADM

## 2024-05-28 RX ORDER — ENOXAPARIN SODIUM 100 MG/ML
40 INJECTION SUBCUTANEOUS EVERY 24 HOURS
Status: DISCONTINUED | OUTPATIENT
Start: 2024-05-29 | End: 2024-05-28

## 2024-05-28 RX ORDER — ONDANSETRON 4 MG/1
4 TABLET, ORALLY DISINTEGRATING ORAL EVERY 30 MIN PRN
Status: DISCONTINUED | OUTPATIENT
Start: 2024-05-28 | End: 2024-05-28 | Stop reason: HOSPADM

## 2024-05-28 RX ORDER — NALOXONE HYDROCHLORIDE 0.4 MG/ML
0.4 INJECTION, SOLUTION INTRAMUSCULAR; INTRAVENOUS; SUBCUTANEOUS
Status: DISCONTINUED | OUTPATIENT
Start: 2024-05-28 | End: 2024-05-30 | Stop reason: HOSPADM

## 2024-05-28 RX ORDER — PROPOFOL 10 MG/ML
INJECTION, EMULSION INTRAVENOUS PRN
Status: DISCONTINUED | OUTPATIENT
Start: 2024-05-28 | End: 2024-05-28

## 2024-05-28 RX ORDER — AMOXICILLIN 250 MG
2 CAPSULE ORAL 2 TIMES DAILY
Status: DISCONTINUED | OUTPATIENT
Start: 2024-05-28 | End: 2024-05-30 | Stop reason: HOSPADM

## 2024-05-28 RX ORDER — SODIUM CHLORIDE, SODIUM GLUCONATE, SODIUM ACETATE, POTASSIUM CHLORIDE AND MAGNESIUM CHLORIDE 526; 502; 368; 37; 30 MG/100ML; MG/100ML; MG/100ML; MG/100ML; MG/100ML
INJECTION, SOLUTION INTRAVENOUS CONTINUOUS PRN
Status: DISCONTINUED | OUTPATIENT
Start: 2024-05-28 | End: 2024-05-28

## 2024-05-28 RX ORDER — HEPARIN SODIUM 5000 [USP'U]/.5ML
5000 INJECTION, SOLUTION INTRAVENOUS; SUBCUTANEOUS
Status: COMPLETED | OUTPATIENT
Start: 2024-05-28 | End: 2024-05-28

## 2024-05-28 RX ORDER — BUPIVACAINE HYDROCHLORIDE 2.5 MG/ML
INJECTION, SOLUTION INFILTRATION; PERINEURAL PRN
Status: DISCONTINUED | OUTPATIENT
Start: 2024-05-28 | End: 2024-05-28

## 2024-05-28 RX ORDER — ENOXAPARIN SODIUM 100 MG/ML
40 INJECTION SUBCUTANEOUS EVERY 24 HOURS
Status: DISCONTINUED | OUTPATIENT
Start: 2024-05-29 | End: 2024-05-30 | Stop reason: HOSPADM

## 2024-05-28 RX ORDER — CEFAZOLIN SODIUM/WATER 2 G/20 ML
2 SYRINGE (ML) INTRAVENOUS
Status: COMPLETED | OUTPATIENT
Start: 2024-05-28 | End: 2024-05-28

## 2024-05-28 RX ORDER — TRAZODONE HYDROCHLORIDE 50 MG/1
50 TABLET, FILM COATED ORAL AT BEDTIME
Status: DISCONTINUED | OUTPATIENT
Start: 2024-05-28 | End: 2024-05-30 | Stop reason: HOSPADM

## 2024-05-28 RX ORDER — LIDOCAINE HYDROCHLORIDE 20 MG/ML
INJECTION, SOLUTION INFILTRATION; PERINEURAL PRN
Status: DISCONTINUED | OUTPATIENT
Start: 2024-05-28 | End: 2024-05-28

## 2024-05-28 RX ORDER — TRAZODONE HYDROCHLORIDE 50 MG/1
50-100 TABLET, FILM COATED ORAL AT BEDTIME
Status: DISCONTINUED | OUTPATIENT
Start: 2024-05-28 | End: 2024-05-28

## 2024-05-28 RX ORDER — HYDROMORPHONE HCL IN WATER/PF 6 MG/30 ML
0.2 PATIENT CONTROLLED ANALGESIA SYRINGE INTRAVENOUS EVERY 5 MIN PRN
Status: DISCONTINUED | OUTPATIENT
Start: 2024-05-28 | End: 2024-05-28 | Stop reason: HOSPADM

## 2024-05-28 RX ORDER — OXYCODONE HYDROCHLORIDE 5 MG/1
5 TABLET ORAL EVERY 4 HOURS PRN
Status: DISCONTINUED | OUTPATIENT
Start: 2024-05-28 | End: 2024-05-30 | Stop reason: HOSPADM

## 2024-05-28 RX ORDER — ACETAMINOPHEN 325 MG/1
650 TABLET ORAL EVERY 6 HOURS
Status: DISCONTINUED | OUTPATIENT
Start: 2024-05-28 | End: 2024-05-30 | Stop reason: HOSPADM

## 2024-05-28 RX ORDER — NALOXONE HYDROCHLORIDE 0.4 MG/ML
0.2 INJECTION, SOLUTION INTRAMUSCULAR; INTRAVENOUS; SUBCUTANEOUS
Status: DISCONTINUED | OUTPATIENT
Start: 2024-05-28 | End: 2024-05-30 | Stop reason: HOSPADM

## 2024-05-28 RX ORDER — OXYCODONE HYDROCHLORIDE 10 MG/1
10 TABLET ORAL EVERY 4 HOURS PRN
Status: DISCONTINUED | OUTPATIENT
Start: 2024-05-28 | End: 2024-05-30 | Stop reason: HOSPADM

## 2024-05-28 RX ORDER — ONDANSETRON 2 MG/ML
4 INJECTION INTRAMUSCULAR; INTRAVENOUS EVERY 30 MIN PRN
Status: DISCONTINUED | OUTPATIENT
Start: 2024-05-28 | End: 2024-05-28 | Stop reason: HOSPADM

## 2024-05-28 RX ORDER — LIDOCAINE 40 MG/G
CREAM TOPICAL
Status: DISCONTINUED | OUTPATIENT
Start: 2024-05-28 | End: 2024-05-30 | Stop reason: HOSPADM

## 2024-05-28 RX ORDER — SIMETHICONE 80 MG
80 TABLET,CHEWABLE ORAL 4 TIMES DAILY PRN
Status: DISCONTINUED | OUTPATIENT
Start: 2024-05-28 | End: 2024-05-30 | Stop reason: HOSPADM

## 2024-05-28 RX ORDER — SODIUM CHLORIDE, SODIUM LACTATE, POTASSIUM CHLORIDE, CALCIUM CHLORIDE 600; 310; 30; 20 MG/100ML; MG/100ML; MG/100ML; MG/100ML
INJECTION, SOLUTION INTRAVENOUS CONTINUOUS
Status: DISCONTINUED | OUTPATIENT
Start: 2024-05-28 | End: 2024-05-28

## 2024-05-28 RX ORDER — FENTANYL CITRATE 50 UG/ML
25 INJECTION, SOLUTION INTRAMUSCULAR; INTRAVENOUS EVERY 5 MIN PRN
Status: DISCONTINUED | OUTPATIENT
Start: 2024-05-28 | End: 2024-05-28 | Stop reason: HOSPADM

## 2024-05-28 RX ORDER — HYDRALAZINE HYDROCHLORIDE 20 MG/ML
2.5-5 INJECTION INTRAMUSCULAR; INTRAVENOUS EVERY 10 MIN PRN
Status: DISCONTINUED | OUTPATIENT
Start: 2024-05-28 | End: 2024-05-28 | Stop reason: HOSPADM

## 2024-05-28 RX ORDER — HYDROMORPHONE HCL IN WATER/PF 6 MG/30 ML
0.4 PATIENT CONTROLLED ANALGESIA SYRINGE INTRAVENOUS EVERY 5 MIN PRN
Status: DISCONTINUED | OUTPATIENT
Start: 2024-05-28 | End: 2024-05-28 | Stop reason: HOSPADM

## 2024-05-28 RX ORDER — BISACODYL 10 MG
10 SUPPOSITORY, RECTAL RECTAL DAILY
Status: DISCONTINUED | OUTPATIENT
Start: 2024-05-29 | End: 2024-05-30 | Stop reason: HOSPADM

## 2024-05-28 RX ORDER — METRONIDAZOLE 500 MG/100ML
500 INJECTION, SOLUTION INTRAVENOUS
Status: COMPLETED | OUTPATIENT
Start: 2024-05-28 | End: 2024-05-28

## 2024-05-28 RX ORDER — SODIUM CHLORIDE, SODIUM LACTATE, POTASSIUM CHLORIDE, CALCIUM CHLORIDE 600; 310; 30; 20 MG/100ML; MG/100ML; MG/100ML; MG/100ML
INJECTION, SOLUTION INTRAVENOUS CONTINUOUS
Status: DISCONTINUED | OUTPATIENT
Start: 2024-05-28 | End: 2024-05-28 | Stop reason: HOSPADM

## 2024-05-28 RX ORDER — NALOXONE HYDROCHLORIDE 0.4 MG/ML
0.1 INJECTION, SOLUTION INTRAMUSCULAR; INTRAVENOUS; SUBCUTANEOUS
Status: DISCONTINUED | OUTPATIENT
Start: 2024-05-28 | End: 2024-05-28 | Stop reason: HOSPADM

## 2024-05-28 RX ORDER — ALBUTEROL SULFATE 0.83 MG/ML
2.5 SOLUTION RESPIRATORY (INHALATION) EVERY 4 HOURS PRN
Status: DISCONTINUED | OUTPATIENT
Start: 2024-05-28 | End: 2024-05-28 | Stop reason: HOSPADM

## 2024-05-28 RX ORDER — FENTANYL CITRATE 50 UG/ML
INJECTION, SOLUTION INTRAMUSCULAR; INTRAVENOUS PRN
Status: DISCONTINUED | OUTPATIENT
Start: 2024-05-28 | End: 2024-05-28

## 2024-05-28 RX ORDER — SODIUM CHLORIDE, SODIUM LACTATE, POTASSIUM CHLORIDE, CALCIUM CHLORIDE 600; 310; 30; 20 MG/100ML; MG/100ML; MG/100ML; MG/100ML
INJECTION, SOLUTION INTRAVENOUS CONTINUOUS PRN
Status: DISCONTINUED | OUTPATIENT
Start: 2024-05-28 | End: 2024-05-28

## 2024-05-28 RX ORDER — KETOROLAC TROMETHAMINE 30 MG/ML
INJECTION, SOLUTION INTRAMUSCULAR; INTRAVENOUS PRN
Status: DISCONTINUED | OUTPATIENT
Start: 2024-05-28 | End: 2024-05-28

## 2024-05-28 RX ORDER — KETOROLAC TROMETHAMINE 30 MG/ML
15 INJECTION, SOLUTION INTRAMUSCULAR; INTRAVENOUS
Status: DISCONTINUED | OUTPATIENT
Start: 2024-05-28 | End: 2024-05-28 | Stop reason: HOSPADM

## 2024-05-28 RX ORDER — ACETAMINOPHEN 325 MG/1
975 TABLET ORAL ONCE
Status: COMPLETED | OUTPATIENT
Start: 2024-05-28 | End: 2024-05-28

## 2024-05-28 RX ORDER — DIPHENHYDRAMINE HCL 12.5MG/5ML
12.5 LIQUID (ML) ORAL EVERY 6 HOURS PRN
Status: DISCONTINUED | OUTPATIENT
Start: 2024-05-28 | End: 2024-05-30 | Stop reason: HOSPADM

## 2024-05-28 RX ORDER — DEXAMETHASONE SODIUM PHOSPHATE 4 MG/ML
INJECTION, SOLUTION INTRA-ARTICULAR; INTRALESIONAL; INTRAMUSCULAR; INTRAVENOUS; SOFT TISSUE PRN
Status: DISCONTINUED | OUTPATIENT
Start: 2024-05-28 | End: 2024-05-28

## 2024-05-28 RX ORDER — FENTANYL CITRATE 50 UG/ML
50 INJECTION, SOLUTION INTRAMUSCULAR; INTRAVENOUS EVERY 5 MIN PRN
Status: DISCONTINUED | OUTPATIENT
Start: 2024-05-28 | End: 2024-05-28 | Stop reason: HOSPADM

## 2024-05-28 RX ORDER — CEFAZOLIN SODIUM/WATER 2 G/20 ML
2 SYRINGE (ML) INTRAVENOUS SEE ADMIN INSTRUCTIONS
Status: DISCONTINUED | OUTPATIENT
Start: 2024-05-28 | End: 2024-05-28 | Stop reason: HOSPADM

## 2024-05-28 RX ORDER — FAMOTIDINE 20 MG/1
20 TABLET, FILM COATED ORAL 2 TIMES DAILY
Status: DISCONTINUED | OUTPATIENT
Start: 2024-05-28 | End: 2024-05-30 | Stop reason: HOSPADM

## 2024-05-28 RX ORDER — ONDANSETRON 4 MG/1
4 TABLET, ORALLY DISINTEGRATING ORAL EVERY 6 HOURS PRN
Status: DISCONTINUED | OUTPATIENT
Start: 2024-05-28 | End: 2024-05-30 | Stop reason: HOSPADM

## 2024-05-28 RX ORDER — MEPERIDINE HYDROCHLORIDE 25 MG/ML
12.5 INJECTION INTRAMUSCULAR; INTRAVENOUS; SUBCUTANEOUS EVERY 5 MIN PRN
Status: DISCONTINUED | OUTPATIENT
Start: 2024-05-28 | End: 2024-05-28 | Stop reason: HOSPADM

## 2024-05-28 RX ORDER — TRAZODONE HYDROCHLORIDE 100 MG/1
100 TABLET ORAL AT BEDTIME
Status: DISCONTINUED | OUTPATIENT
Start: 2024-05-28 | End: 2024-05-30 | Stop reason: HOSPADM

## 2024-05-28 RX ORDER — AMOXICILLIN 250 MG
1 CAPSULE ORAL 2 TIMES DAILY
Status: DISCONTINUED | OUTPATIENT
Start: 2024-05-28 | End: 2024-05-30 | Stop reason: HOSPADM

## 2024-05-28 RX ORDER — ALBUTEROL SULFATE 90 UG/1
AEROSOL, METERED RESPIRATORY (INHALATION) PRN
Status: DISCONTINUED | OUTPATIENT
Start: 2024-05-28 | End: 2024-05-28

## 2024-05-28 RX ORDER — DEXAMETHASONE SODIUM PHOSPHATE 4 MG/ML
4 INJECTION, SOLUTION INTRA-ARTICULAR; INTRALESIONAL; INTRAMUSCULAR; INTRAVENOUS; SOFT TISSUE
Status: DISCONTINUED | OUTPATIENT
Start: 2024-05-28 | End: 2024-05-28 | Stop reason: HOSPADM

## 2024-05-28 RX ORDER — NICOTINE 21 MG/24HR
1 PATCH, TRANSDERMAL 24 HOURS TRANSDERMAL DAILY PRN
Status: DISCONTINUED | OUTPATIENT
Start: 2024-05-28 | End: 2024-05-30 | Stop reason: HOSPADM

## 2024-05-28 RX ORDER — DIPHENHYDRAMINE HYDROCHLORIDE 50 MG/ML
12.5 INJECTION INTRAMUSCULAR; INTRAVENOUS EVERY 6 HOURS PRN
Status: DISCONTINUED | OUTPATIENT
Start: 2024-05-28 | End: 2024-05-30 | Stop reason: HOSPADM

## 2024-05-28 RX ORDER — CALCIUM CARBONATE 500 MG/1
500 TABLET, CHEWABLE ORAL 4 TIMES DAILY PRN
Status: DISCONTINUED | OUTPATIENT
Start: 2024-05-28 | End: 2024-05-30 | Stop reason: HOSPADM

## 2024-05-28 RX ADMIN — Medication 20 MG: at 14:00

## 2024-05-28 RX ADMIN — Medication 20 MG: at 14:30

## 2024-05-28 RX ADMIN — Medication 20 MG: at 15:30

## 2024-05-28 RX ADMIN — METRONIDAZOLE 500 MG: 500 INJECTION, SOLUTION INTRAVENOUS at 10:54

## 2024-05-28 RX ADMIN — Medication 2 G: at 12:16

## 2024-05-28 RX ADMIN — FENTANYL CITRATE 50 MCG: 50 INJECTION INTRAMUSCULAR; INTRAVENOUS at 12:35

## 2024-05-28 RX ADMIN — Medication 20 MG: at 15:02

## 2024-05-28 RX ADMIN — SODIUM CHLORIDE, POTASSIUM CHLORIDE, SODIUM LACTATE AND CALCIUM CHLORIDE: 600; 310; 30; 20 INJECTION, SOLUTION INTRAVENOUS at 17:45

## 2024-05-28 RX ADMIN — SODIUM CHLORIDE, SODIUM GLUCONATE, SODIUM ACETATE, POTASSIUM CHLORIDE AND MAGNESIUM CHLORIDE: 526; 502; 368; 37; 30 INJECTION, SOLUTION INTRAVENOUS at 14:02

## 2024-05-28 RX ADMIN — Medication 20 MG: at 12:30

## 2024-05-28 RX ADMIN — SODIUM CHLORIDE, POTASSIUM CHLORIDE, SODIUM LACTATE AND CALCIUM CHLORIDE: 600; 310; 30; 20 INJECTION, SOLUTION INTRAVENOUS at 13:55

## 2024-05-28 RX ADMIN — FENTANYL CITRATE 50 MCG: 50 INJECTION INTRAMUSCULAR; INTRAVENOUS at 15:24

## 2024-05-28 RX ADMIN — MIDAZOLAM 2 MG: 1 INJECTION INTRAMUSCULAR; INTRAVENOUS at 12:09

## 2024-05-28 RX ADMIN — Medication 10 MG: at 13:43

## 2024-05-28 RX ADMIN — DOCUSATE SODIUM 50 MG AND SENNOSIDES 8.6 MG 1 TABLET: 8.6; 5 TABLET, FILM COATED ORAL at 19:38

## 2024-05-28 RX ADMIN — OXYCODONE HYDROCHLORIDE 5 MG: 5 TABLET ORAL at 19:38

## 2024-05-28 RX ADMIN — Medication 50 MG: at 12:18

## 2024-05-28 RX ADMIN — FAMOTIDINE 20 MG: 20 TABLET ORAL at 19:38

## 2024-05-28 RX ADMIN — ACETAMINOPHEN 650 MG: 325 TABLET, FILM COATED ORAL at 17:46

## 2024-05-28 RX ADMIN — ALBUTEROL SULFATE 6 PUFF: 108 INHALANT RESPIRATORY (INHALATION) at 12:35

## 2024-05-28 RX ADMIN — PROPOFOL 30 MG: 10 INJECTION, EMULSION INTRAVENOUS at 12:37

## 2024-05-28 RX ADMIN — LIDOCAINE HYDROCHLORIDE 60 MG: 20 INJECTION, SOLUTION INFILTRATION; PERINEURAL at 12:16

## 2024-05-28 RX ADMIN — HYDROMORPHONE HYDROCHLORIDE 0.5 MG: 1 INJECTION, SOLUTION INTRAMUSCULAR; INTRAVENOUS; SUBCUTANEOUS at 13:36

## 2024-05-28 RX ADMIN — FENTANYL CITRATE 50 MCG: 50 INJECTION INTRAMUSCULAR; INTRAVENOUS at 15:41

## 2024-05-28 RX ADMIN — KETOROLAC TROMETHAMINE 15 MG: 30 INJECTION, SOLUTION INTRAMUSCULAR at 16:03

## 2024-05-28 RX ADMIN — Medication 20 MG: at 13:05

## 2024-05-28 RX ADMIN — SODIUM CHLORIDE, POTASSIUM CHLORIDE, SODIUM LACTATE AND CALCIUM CHLORIDE: 600; 310; 30; 20 INJECTION, SOLUTION INTRAVENOUS at 12:08

## 2024-05-28 RX ADMIN — FENTANYL CITRATE 100 MCG: 50 INJECTION INTRAMUSCULAR; INTRAVENOUS at 12:16

## 2024-05-28 RX ADMIN — PROPOFOL 30 MG: 10 INJECTION, EMULSION INTRAVENOUS at 12:31

## 2024-05-28 RX ADMIN — PROPOFOL 140 MG: 10 INJECTION, EMULSION INTRAVENOUS at 12:16

## 2024-05-28 RX ADMIN — Medication 2 G: at 16:00

## 2024-05-28 RX ADMIN — DEXAMETHASONE SODIUM PHOSPHATE 4 MG: 4 INJECTION, SOLUTION INTRA-ARTICULAR; INTRALESIONAL; INTRAMUSCULAR; INTRAVENOUS; SOFT TISSUE at 12:19

## 2024-05-28 RX ADMIN — HEPARIN SODIUM 5000 UNITS: 5000 INJECTION, SOLUTION INTRAVENOUS; SUBCUTANEOUS at 10:56

## 2024-05-28 RX ADMIN — ACETAMINOPHEN 975 MG: 325 TABLET, FILM COATED ORAL at 10:45

## 2024-05-28 RX ADMIN — FENTANYL CITRATE 50 MCG: 50 INJECTION INTRAMUSCULAR; INTRAVENOUS at 12:56

## 2024-05-28 RX ADMIN — SUGAMMADEX 200 MG: 100 INJECTION, SOLUTION INTRAVENOUS at 16:21

## 2024-05-28 ASSESSMENT — ACTIVITIES OF DAILY LIVING (ADL)
ADLS_ACUITY_SCORE: 33
ADLS_ACUITY_SCORE: 35
ADLS_ACUITY_SCORE: 20
ADLS_ACUITY_SCORE: 35
ADLS_ACUITY_SCORE: 35
ADLS_ACUITY_SCORE: 20
ADLS_ACUITY_SCORE: 35
ADLS_ACUITY_SCORE: 35
ADLS_ACUITY_SCORE: 20
ADLS_ACUITY_SCORE: 35

## 2024-05-28 ASSESSMENT — LIFESTYLE VARIABLES: TOBACCO_USE: 1

## 2024-05-28 NOTE — BRIEF OP NOTE
[facility]    Brief Operative Note  May 28, 2024; 4:07 PM     Pre-operative diagnosis: Malignant neoplasm of vulva (H) [C51.9]  Post-operative diagnosis Same as pre-operative diagnosis    Procedure: Radical excision of right vulvar lesion. Removal of lymph nodes from left and right groin. Biopsies of cervix., N/A - Vulva  Laparoscopic lymphadenectomy, N/A - Abdomen    Surgeon: Surgeons and Role:     * Javier Roche MD - Primary     * Indira Blanca MD - Assisting     * Tee Reyes MD - Resident - Assisting  Anesthesia: General   Estimated Blood Loss: 100 mL  IVF: 1500 mL  UOp: 200 mL    Drains:  Valera catheter  Bilateral groin drains    Specimens:   ID Type Source Tests Collected by Time Destination   1 : margin biopsy 12 o'clock Tissue Vulva SURGICAL PATHOLOGY EXAM Javier Roche MD 5/28/2024 12:38 PM    2 : margin biopsy 3 o'clock Tissue Vulva SURGICAL PATHOLOGY EXAM Javier Roche MD 5/28/2024 12:40 PM    3 : margin biopsy 6 o'clock Tissue Vulva SURGICAL PATHOLOGY EXAM Javier Roche MD 5/28/2024 12:40 PM    4 : right inguinal femoral lymph node Tissue Lymph Node(s), Groin, Right SURGICAL PATHOLOGY EXAM Javier Roche MD 5/28/2024  1:17 PM    5 : left inguinal femoral lymph node Tissue Lymph Node(s), Groin, Left SURGICAL PATHOLOGY EXAM Javier Roche MD 5/28/2024  1:39 PM    6 : right vulva, stitch at 12 Tissue Vulva SURGICAL PATHOLOGY EXAM Javier Roche MD 5/28/2024  2:11 PM    7 : Endo-Cervical Curretage Tissue Cervix SURGICAL PATHOLOGY EXAM Javier Roche MD 5/28/2024  2:50 PM    8 : Random Cervical Biopsies (2) Tissue Cervix SURGICAL PATHOLOGY EXAM Javier Roche MD 5/28/2024  2:54 PM    9 : Right Pelvic Lymph Nodes Tissue Lymph Node(s), Pelvis, Right SURGICAL PATHOLOGY EXAM Javier Roche MD 5/28/2024  3:48 PM      Findings:     -  Large left mid-vulvar lesion  - Large L groin nodes  - No other overt tumor  - Healthy-appearing urethra and cervix    .  Complications: None apparent    Tee Reyes MD  Obstetrics and Gynecology, PGY-1  05/28/2024 4:06 PM

## 2024-05-28 NOTE — OR NURSING
"Page sent to GYN/ONC: \"Regina Gaspar: planned surgery at noon today, no valid H&P on file within last 30 days. Plz advise, thanks. Patricio Jacobs ([facility]) or [phone].\"  "

## 2024-05-28 NOTE — ANESTHESIA POSTPROCEDURE EVALUATION
Patient: Regina Gaspar    Procedure: Procedure(s):  Radical excision of right vulvar lesion. Removal of lymph nodes from left and right groin. Biopsies of cervix.  Laparoscopic lymphadenectomy       Anesthesia Type:  General    Note:  Disposition: Inpatient   Postop Pain Control: Uneventful            Sign Out: Well controlled pain   PONV: No   Neuro/Psych: Uneventful            Sign Out: Acceptable/Baseline neuro status   Airway/Respiratory: Uneventful            Sign Out: Acceptable/Baseline resp. status   CV/Hemodynamics: Uneventful            Sign Out: Acceptable CV status; No obvious hypovolemia; No obvious fluid overload   Other NRE:    DID A NON-ROUTINE EVENT OCCUR? No           Last vitals:  Vitals Value Taken Time   /68 05/28/24 1700   Temp     Pulse 80 05/28/24 1707   Resp 16 05/28/24 1707   SpO2 100 % 05/28/24 1707   Vitals shown include unfiled device data.    Electronically Signed By: Raz Lin MD  May 28, 2024  5:08 PM

## 2024-05-28 NOTE — ANESTHESIA PREPROCEDURE EVALUATION
Anesthesia Pre-Procedure Evaluation    Patient: Regina Gaspar   MRN: [id number] : 1960        Procedure : Procedure(s):  Radical excision of right vulvar lesion. Removal of lymph nodes from left and right groin.  Possible scraping or biopsies of cervix.  Possible flap for closure of vulvar defect.  Laparoscopic lymphadenectomy          No past medical history on file.   Past Surgical History:   Procedure Laterality Date     SECTION        No Known Allergies   Social History     Tobacco Use    Smoking status: Every Day     Current packs/day: 1.00     Types: Cigarettes    Smokeless tobacco: Never    Tobacco comments:     50 pack years   Substance Use Topics    Alcohol use: Yes     Comment: quart vodka/ week      Wt Readings from Last 1 Encounters:   24 66.1 kg (145 lb 11.6 oz)        Anesthesia Evaluation   Pt has had prior anesthetic.     No history of anesthetic complications       ROS/MED HX  ENT/Pulmonary:     (+)                tobacco use, Current use,                       Neurologic:  - neg neurologic ROS     Cardiovascular:  - neg cardiovascular ROS     METS/Exercise Tolerance: >4 METS    Hematologic:  - neg hematologic  ROS     Musculoskeletal:  - neg musculoskeletal ROS     GI/Hepatic:    (-) GERD   Renal/Genitourinary:  - neg Renal ROS     Endo:  - neg endo ROS     Psychiatric/Substance Use:  - neg psychiatric ROS     Infectious Disease:       Malignancy:   (+) Malignancy, History of Other.Other CA Metastatic vulvar CA status post.    Other:            Physical Exam    Airway        Mallampati: II   TM distance: > 3 FB   Neck ROM: full   Mouth opening: > 3 cm    Respiratory Devices and Support         Dental     Comment: Upper dentures out. Lower teeth in poor condition and multiple missing teeth    (+) Removable bridges or other hardware      Cardiovascular             Pulmonary                   OUTSIDE LABS:  CBC:   Lab Results   Component Value Date    WBC 7.7 2024    HGB  "15.4 03/31/2024    HCT 45.7 03/31/2024     03/31/2024     BMP:   Lab Results   Component Value Date     03/31/2024    POTASSIUM 3.7 03/31/2024    CHLORIDE 95 (L) 03/31/2024    CO2 28 03/31/2024    BUN 12.4 03/31/2024    CR 0.64 03/31/2024    GLC 82 05/28/2024    GLC 71 04/29/2024     COAGS: No results found for: \"PTT\", \"INR\", \"FIBR\"  POC: No results found for: \"BGM\", \"HCG\", \"HCGS\"  HEPATIC:   Lab Results   Component Value Date    ALBUMIN 4.7 03/31/2024    PROTTOTAL 7.9 03/31/2024    ALT 20 03/31/2024    AST 30 03/31/2024    ALKPHOS 81 03/31/2024    BILITOTAL 0.5 03/31/2024     OTHER:   Lab Results   Component Value Date    BRANDON 9.1 03/31/2024       Anesthesia Plan    ASA Status:  3    NPO Status:  NPO Appropriate    Anesthesia Type: General.     - Airway: ETT   Induction: Intravenous, Propofol.   Maintenance: Balanced.   Techniques and Equipment:     - Lines/Monitors: 2nd IV     Consents    Anesthesia Plan(s) and associated risks, benefits, and realistic alternatives discussed. Questions answered and patient/representative(s) expressed understanding.     - Discussed:     - Discussed with:  Patient      - Extended Intubation/Ventilatory Support Discussed: No.      - Patient is DNR/DNI Status: No     Use of blood products discussed: Yes.     - Discussed with: Patient.     - Consented: consented to blood products     Postoperative Care    Pain management: IV analgesics, Oral pain medications, Multi-modal analgesia.   PONV prophylaxis: Ondansetron (or other 5HT-3), Dexamethasone or Solumedrol     Comments:               Raz Dodge MD    I have reviewed the pertinent notes and labs in the chart from the past 30 days and (re)examined the patient.  Any updates or changes from those notes are reflected in this note.                  "

## 2024-05-28 NOTE — OP NOTE
This is Javier Roche dictating a brief op report on Regina Gaspar.    Pre-op diagnosis is malignant neoplasm of the vulva (squamous cell carcinoma)    Postop diagnosis is same    Procedure: Radical excision of right vulvar lesion.  Removal of left and right pelvic lymph nodes.  Removal of right obturator lymph nodes.  Biopsies of cervix.    Surgeon: Kaley    Assisting: Tena and Eric    Anesthesia GETA    Estimated blood loss 100 cc    IV fluids: 1500 cc    Urine output: 200 mL    Tubes and drains: Valera catheter and bilateral RADHA drains in the groins    Intraoperative findings include grossly abnormal right vulva with lesion measuring approximately 4 x 4 cm involving the midportion of the labia majora and minora.  Enlarged right groin lymph node, no abnormal nodes in the left groin, enlarged obturator space node, no gross evidence of disease otherwise in the abdomen.  Grossly normal-appearing cervix.    Operative report    After obtaining informed consent patient was brought to the operating room where she was placed in supine position.  She underwent induction of general anesthesia and was placed in dorsolithotomy position.  Prepped and draped in usual sterile fashion examined under anesthesia.  Valera was placed sterilely.  Timeout was performed and the patient initially underwent a circumferential diagramming of the planned excision with margin specimens obtained at the 12 3 and 6:00 regions these were sent for frozen section returned as negative suggesting an adequate margin was obtained.  The attention was first turned to the right inguinal lymph nodes.  A line was made approximately 2 cm below the line between the ASIS and the superior lateral margin of the pubic symphysis the second line was extended sharply down to the level of the superficial fascia and the defect was then opened using sharp and blunt dissection.    1 large lymph node measuring approximately 2 cm was identified and was removed en bloc  with the remaining lymph nodes from the superficial portion at the conclusion all nodes above the cribriform fascia were removed and there was no gross areas of suspicious masses beneath the cribriform fascia.    The defect was closed in the series of simple vertical sutures of Vicryl and staples at the skin a RADHA drain was placed at the inferior most margin and brought out laterally.    Same procedure was performed on the left side although the dissection was not carried quite as far laterally and there was no gross evidence of disease.    This point the fold was where the draped in the vulvar excision was performed with the intent to get approximately 2 cm margin around the lesion dissection was carried sharply down to the level of fascia and the specimen was amputated without event we contemplated a flap closure however there due to created.  Appeared to be adequate mobility to mobilize the lateral labia medially and cover the entire defect without having to compromise the blood supply further.    To this and the defect was closed using 2 and 3-0 PDS suture in a series of interrupted vertical sutures and sewn closed at the skin using 4-0 Monocryl and bolstered using 2-0 Vicryl mattress sutures.    Per the previous discussion there was a node of a lymph node on the PET scan in the right obturator space and no additional nodes this reason we performed a lymphadenectomy of the obturator space to confirm that this was consistent with metastasis.  This was performed by placing ports in the umbilicus and then under direct visualization placing ports in the left and right lower quadrant remainder the abdomen appeared grossly normal the IP ligament was displaced medially and a lateral incision was made over the psoas muscle and reflected medially to identify the external iliac artery and vein.  The dissection was carried medially around the vein and then ultimately into the obturator space with care the  nerve  could be identified and the lymph node packet was removed in its entirety from the bifurcation of the comments to the deep circumflex iliac vessel and removed and in the single specimen and bag.  There were as there was at least 1 suspicious noted in the bag but no additional abnormalities were identified.    The defect was marked using 2 6 clips for evaluation for radiation.    This point decision made to terminate the procedure 12 mm port in the left lower quadrant was closed using a PMI the remainder of the ports were closed using 4-0 Monocryl at the skin the vulvar and abdominal incisions were infiltrated with Marcaine and dressed and the patient was placed back in supine position.      Javier Roche MD

## 2024-05-28 NOTE — PROGRESS NOTES
"Gynecology Oncology Progress Note  May 29, 2024    Ms. Regina Gaspar is a 64 year old POD# 1 s/p radical excision R vulvar lesion, bilateral pelvic and R obturator lymph node excision, cervical biopsies    Dz: SCC of the vulva    24 hour events:   - Surgery as above    Subjective: Having no pain. Tolerating PO fluids without nausea or vomiting, not yet eating. Passing flatus. Valera in place. Denies fevers, chills, chest pain, SOB, headaches, dizziness. No other questions or concerns.    Objective:   /55   Pulse 90   Temp 98.8  F (37.1  C) (Oral)   Resp 14   Ht 1.727 m (5' 8\")   Wt 66.1 kg (145 lb 11.6 oz)   LMP  (LMP Unknown)   SpO2 97%   BMI 22.16 kg/m      General: in NAD  CV: WWP  Resp: no increased work of breathing  Abdomen: soft, non tender, tympanitic  Abd Incisions: c/d/I, covered with bandages, no shadowing  Vulva: Mild swelling of R vulva, scant dry blood. Incision intact. Not actively bleeding. Moderate amount of blood on fluffs.  Extremities: nontender, SCDs in place  Lines/Drains: Valera catheter, bilateral inguinal RADHA drains with scant serous and serosanguinous fluid    I/Os  (Yesterday // Since Midnight)  IV: 1940 mL // Not charted  Urine 600 mL // 250 mL  Drains R: 45ml L:30    New labs/imaging-  Ancillary Procedure on 04/29/2024   Component Date Value Ref Range Status    Glucose 04/29/2024 71  70 - 99 mg/dL Final         Assessment/Plan:     #S/p radical excision R vulvar lesion, bilateral pelvic and R obturator lymph node excision, cervical biopsies  Doing well postoperatively. Beginning to meet postoperative goals  - Pain: Tylenol, ibuprofen, oxycodone PRN  - mIVF off today, ADAT  - Valera catheter for one week, bactrim ppx  - Perineal cares ordered  - Ppx: SCDs, IS, Lovenox starting today    #Bradycardia  Pt asymptomatic despite bradycardia overnight. Most likely her baseline.  - Continue to monitor  - Consider EKG if symptomatic    Disposition: Pending postoperative " aureliano Woods, MS4  [employer]      RESIDENT WITH STUDENT: I saw the patient with the medical student on the date of the medical student's note and performed, or re-performed, the physical exam and medical decision-making in the provision of this service and have verified the accuracy of all the medical student documentation and edited as necessary.     Tee Reyes MD  Obstetrics and Gynecology, PGY-1  05/29/2024 7:25 AM    I have examined this patient personally with the team and agree with the above findings and plan.    Javier Roche

## 2024-05-28 NOTE — PROGRESS NOTES
Gynecologic Oncology   Postoperative Check  5/28/2024    S:   Patient reports she is doing very well postoperatively. Pain is well controlled with oral medications. Has not been up to ambulate yet. Valera in place. Tolerating crackers and water without nausea or vomiting. Gauze soaked with blood; however no active bleeding, and gauze has not been changed since leaving the OR. Denies chest pain, shortness of breath, dizziness, or other concerns at this time    O:  Vitals:    05/28/24 1845 05/28/24 1900 05/28/24 2000 05/28/24 2030   BP: (!) 105/93 105/67 104/57 108/57   BP Location:       Pulse: 88      Resp: 16      Temp:       TempSrc:       SpO2: 98% 100%     Weight:       Height:           Gen: NAD, resting in bed   Cardio: RRR, S1/S2, no murmurs  Resp: CTAB, no wheezing or crackles  Abdomen: soft, appropriately tender, non distended, both RADHA drains in place and draining thin sanguinous fluid  : right vulva with no active bleeding. Valera catheter in place.  Extremities: Non-tender, trace edema    A/P:   64 year old POD#0 s/p Radical excision of R vulvar lesion, bilateral inguinal LN excision. Doing well postoperatively. Will remain inpatient pending postoperative course. She will follow-up post-op with Dr. oRche on 5/30.    Dz: Vulvar cancer  FEN: Advance as tolerated  Pain: Tyl, ibu, oxy PRN  GI: Stool softeners PRN  : Valera in place    Dispo: Pending postop course    Tee Reyes MD  Obstetrics and Gynecology, PGY-1  05/28/2024 8:45 PM

## 2024-05-28 NOTE — ANESTHESIA PROCEDURE NOTES
Airway       Patient location during procedure: OR       Procedure Start/Stop Times: 5/28/2024 12:20 PM  Staff -        CRNA: Maricarmen Giang APRN CRNA       Performed By: CRNA  Consent for Airway        Urgency: elective  Indications and Patient Condition       Indications for airway management: janie-procedural       Induction type:intravenous       Mask difficulty assessment: 2 - vent by mask + OA or adjuvant +/- NMBA    Final Airway Details       Final airway type: endotracheal airway       Successful airway: ETT - single and Oral  Endotracheal Airway Details        ETT size (mm): 7.0       Cuffed: yes       Successful intubation technique: direct laryngoscopy       DL Blade Type: Higuera 2       Grade View of Cords: 1       Adjucts: stylet       Position: Right       Measured from: gums/teeth       Secured at (cm): 21       Bite block used: None    Post intubation assessment        Placement verified by: capnometry, equal breath sounds and chest rise        Number of attempts at approach: 1       Secured with: tape       Ease of procedure: easy       Dentition: Unchanged    Medication(s) Administered   Medication Administration Time: 5/28/2024 12:20 PM

## 2024-05-28 NOTE — ANESTHESIA CARE TRANSFER NOTE
Patient: Regina Gaspar    Procedure: Procedure(s):  Radical excision of right vulvar lesion. Removal of lymph nodes from left and right groin. Biopsies of cervix.  Laparoscopic lymphadenectomy       Diagnosis: Malignant neoplasm of vulva (H) [C51.9]  Diagnosis Additional Information: No value filed.    Anesthesia Type:   General     Note:    Oropharynx: oropharynx clear of all foreign objects and spontaneously breathing    Oxygen Supplementation: room air    Independent Airway: airway patency satisfactory and stable  Dentition: dentition unchanged  Vital Signs Stable: post-procedure vital signs reviewed and stable  Report to RN Given: handoff report given  Patient transferred to: PACU    Handoff Report: Identifed the Patient, Identified the Reponsible Provider, Reviewed the pertinent medical history, Discussed the surgical course, Reviewed Intra-OP anesthesia mangement and issues during anesthesia, Set expectations for post-procedure period and Allowed opportunity for questions and acknowledgement of understanding      Vitals:  Vitals Value Taken Time   BP     Temp     Pulse 87 05/28/24 1642   Resp 19 05/28/24 1642   SpO2 97 % 05/28/24 1642   Vitals shown include unfiled device data.    Electronically Signed By: CUCO Ashton CRNA  May 28, 2024  4:42 PM

## 2024-05-28 NOTE — H&P
"Gynecologic Oncology  History & Physical  24      HPI: Regina Gaspar is a very pleasant 63 year old woman with a recent diagnosis of vulvar cancer. Per Dr. Hernandez in follow-up from ED visit 3/2024, who reported the following:     \"63 year old non-pregnant female presents today complaining of a vulvar lesion.  Patient was seen in an emergency department on 3/31 with vaginal discharge and a fever.  Patient has noticed a vulvar lesion for approximately 2 months.  It will occasionally bleed.  She states it is bleeding now.  She denies any pain with this lesion.  No problems sitting.  She has no problems urinating.  Normal bowel movements.  No fevers or chills now.  Is not sexually active.  1 .  No back pain out of the norm.  She does have early satiety and notices that this has been going on for the last few weeks.  She is a longtime smoker.  She does not think she is up-to-date on her Pap smear but thinks it was done in the emergency department.  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  2.3 x 2.3 cm low-density lymph node or possible small fluid collection noted within the right groin. Could consider ultrasound and/or biopsy as clinically indicated.   2.  No other evidence of malignancy within the chest, abdomen, and pelvis.  24 PAP NILM, HPV 16+  Review of Systems: Normal other than what's noted in the H&P    Since her visit on , the patient reports her bleeding has increased and the discharge has worsened. Additionally her appetite has decreased. No headache, blurry vision, chest pain, SOB, abdominal pain, constipation/diarrhea, difficulty with urination     Past Medical History:     Past Medical " "History   No past medical history on file.    - Not a regular attender of medical care     Past Surgical History:     Past Surgical History         Past Surgical History:   Procedure Laterality Date     SECTION               Health Maintenance:       Health Maintenance Due   Topic Date Due    NICOTINE/TOBACCO CESSATION COUNSELING Q 1 YR  Never done    YEARLY PREVENTIVE VISIT  Never done    ADVANCE CARE PLANNING  Never done    MAMMO SCREENING  Never done    Pneumococcal Vaccine: Pediatrics (0 to 5 Years) and At-Risk Patients (6 to 64 Years) (1 of 2 - PCV) Never done    COLORECTAL CANCER SCREENING  Never done    HIV SCREENING  Never done    HEPATITIS C SCREENING  Never done    LIPID  Never done    ZOSTER IMMUNIZATION (1 of 2) Never done    RSV VACCINE (Pregnancy & 60+) (1 - 1-dose 60+ series) Never done    INFLUENZA VACCINE (1) 2023    COVID-19 Vaccine (3 - -24 season) 2023    PHQ-2 (once per calendar year)  Never done    COLPOSCOPY  2024         Last Pap Smear:          NILM HPV 16+; prior (remote)     Last Mammogram:      none     Last Colonoscopy:      Never      Current Medications:      has a current medication list which includes the following prescription(s): trazodone.      Allergies:      Patient has no known allergies.      Social History:     Social History            Tobacco Use    Smoking status: Every Day       Types: Cigarettes    Smokeless tobacco: Never   Substance Use Topics    Alcohol use: Yes             History   Drug Use Unknown       Family History:      Family History         Family History   Problem Relation Age of Onset    Breast Cancer Mother           has had a couple times    Colon Cancer Father 80    Uterine Cancer No family hx of             Physical Exam:      Vital signs:  Temp: 98.5  F (36.9  C) Temp src: Oral BP: 125/81 Pulse: 90   Resp: 18 SpO2: 92 % O2 Device: None (Room air)   Height: 172.7 cm (5' 8\") Weight: 66.1 kg (145 lb 11.6 oz)  Estimated " "body mass index is 22.16 kg/m  as calculated from the following:    Height as of this encounter: 1.727 m (5' 8\").    Weight as of this encounter: 66.1 kg (145 lb 11.6 oz).        General: healthy and alert, appears nervous, but in NAD     HEENT:  no thyromegaly, no palpable nodules or masses                                         Cardiovascular: regular rate and rhythm, no murmurs/rubs/gallops       Respiratory: lungs CTAB, no rales, rhonchi, or wheezes.     Musculoskeletal: extremities non tender and without edema.     Skin: no lesions or rashes visible     Neurological: no gross defects                Psychiatric: appropriate mood and affect                                           Gastrointestinal:  abdomen soft, non-tender, non-distended, no organomegaly or masses     Genitourinary (Per Dr. Roche on 4/25): External genitalia is notable for a 4x4 cm exophytic lesion in the right mid vulva.  There is extent ion medially - but there is a gap of normal tissue prior to the urethra..  Vagina is smooth without nodularity or masses.  Cervix appears normal and without lesions.  (Colposopcy and biopsies were deferred despite the HPV 16 diagnosis given the patient's anxiety - we will plan for biopsies at surgery)  There is a right sided medial inguinal lymph node, which is enlarged but minimally mobile/non-tender.  Bimanual exam reveal no masses, nodularity or fullness.       Assessment:     Regina Gaspar is a 63 year old woman with a new diagnosis of vulvar cancer.       Plan:      1.)   Vulvar cancer: We have discussed the clinical, pathologic, and radiologic findings. A PET scan was performed and an additional positive lymph node was noted in the R obutrator space. Recommendation continues to be a radical vulvectomy, right inguinal lymphadenectomy, left sentinel LN biopsy, and additionally a diagnostic laparoscopy. She understands that the defect will be significant and may involve some or all of her distal " urethra.  She may require a flap for closure, and will likely require adjuvant treatment with ERT if the node is positive.     2.) Genetic risk factors were assessed and the patient does not meet the qualifications for a referral.      3.) Pre-op teaching was completed on 4/25/24.  Risks of surgery were discussed to include: bleeding, transfusion, infection, unintentional injury to surrounding organs/structures.     This plan was discussed with Dr. Kaley Reyes MD  Gynecologic-Oncology Service  Obstetrics and Gynecology Resident, PGY-1  05/28/2024 10:40 AM      Pager: [phone]

## 2024-05-29 ENCOUNTER — APPOINTMENT (OUTPATIENT)
Dept: PHYSICAL THERAPY | Facility: CLINIC | Age: 64
DRG: 734 | End: 2024-05-29
Payer: MEDICARE

## 2024-05-29 LAB
ANION GAP SERPL CALCULATED.3IONS-SCNC: 15 MMOL/L (ref 7–15)
BASOPHILS # BLD AUTO: 0 10E3/UL (ref 0–0.2)
BASOPHILS NFR BLD AUTO: 0 %
BUN SERPL-MCNC: 8 MG/DL (ref 8–23)
CALCIUM SERPL-MCNC: 8.3 MG/DL (ref 8.8–10.2)
CHLORIDE SERPL-SCNC: 96 MMOL/L (ref 98–107)
CREAT SERPL-MCNC: 0.66 MG/DL (ref 0.51–0.95)
DEPRECATED HCO3 PLAS-SCNC: 23 MMOL/L (ref 22–29)
EGFRCR SERPLBLD CKD-EPI 2021: >90 ML/MIN/1.73M2
EOSINOPHIL # BLD AUTO: 0 10E3/UL (ref 0–0.7)
EOSINOPHIL NFR BLD AUTO: 0 %
ERYTHROCYTE [DISTWIDTH] IN BLOOD BY AUTOMATED COUNT: 13.2 % (ref 10–15)
GLUCOSE SERPL-MCNC: 129 MG/DL (ref 70–99)
HCT VFR BLD AUTO: 39.9 % (ref 35–47)
HGB BLD-MCNC: 13.3 G/DL (ref 11.7–15.7)
IMM GRANULOCYTES # BLD: 0.1 10E3/UL
IMM GRANULOCYTES NFR BLD: 1 %
LYMPHOCYTES # BLD AUTO: 1 10E3/UL (ref 0.8–5.3)
LYMPHOCYTES NFR BLD AUTO: 11 %
MCH RBC QN AUTO: 34.1 PG (ref 26.5–33)
MCHC RBC AUTO-ENTMCNC: 33.3 G/DL (ref 31.5–36.5)
MCV RBC AUTO: 102 FL (ref 78–100)
MONOCYTES # BLD AUTO: 0.6 10E3/UL (ref 0–1.3)
MONOCYTES NFR BLD AUTO: 7 %
NEUTROPHILS # BLD AUTO: 7.5 10E3/UL (ref 1.6–8.3)
NEUTROPHILS NFR BLD AUTO: 81 %
NRBC # BLD AUTO: 0 10E3/UL
NRBC BLD AUTO-RTO: 0 /100
PLATELET # BLD AUTO: 184 10E3/UL (ref 150–450)
POTASSIUM SERPL-SCNC: 4.4 MMOL/L (ref 3.4–5.3)
RBC # BLD AUTO: 3.9 10E6/UL (ref 3.8–5.2)
SODIUM SERPL-SCNC: 134 MMOL/L (ref 135–145)
WBC # BLD AUTO: 9.2 10E3/UL (ref 4–11)

## 2024-05-29 PROCEDURE — 97161 PT EVAL LOW COMPLEX 20 MIN: CPT | Mod: GP

## 2024-05-29 PROCEDURE — 97116 GAIT TRAINING THERAPY: CPT | Mod: GP

## 2024-05-29 PROCEDURE — 85025 COMPLETE CBC W/AUTO DIFF WBC: CPT

## 2024-05-29 PROCEDURE — 80048 BASIC METABOLIC PNL TOTAL CA: CPT

## 2024-05-29 PROCEDURE — 250N000011 HC RX IP 250 OP 636

## 2024-05-29 PROCEDURE — 999N000111 HC STATISTIC OT IP EVAL DEFER

## 2024-05-29 PROCEDURE — 250N000013 HC RX MED GY IP 250 OP 250 PS 637

## 2024-05-29 PROCEDURE — 120N000002 HC R&B MED SURG/OB UMMC

## 2024-05-29 PROCEDURE — 250N000013 HC RX MED GY IP 250 OP 250 PS 637: Performed by: NURSE PRACTITIONER

## 2024-05-29 PROCEDURE — 36415 COLL VENOUS BLD VENIPUNCTURE: CPT

## 2024-05-29 PROCEDURE — 97530 THERAPEUTIC ACTIVITIES: CPT | Mod: GP

## 2024-05-29 RX ORDER — AMOXICILLIN 250 MG
2 CAPSULE ORAL 2 TIMES DAILY PRN
Qty: 60 TABLET | Refills: 0 | Status: SHIPPED | OUTPATIENT
Start: 2024-05-29 | End: 2024-07-16

## 2024-05-29 RX ORDER — IBUPROFEN 600 MG/1
600 TABLET, FILM COATED ORAL EVERY 6 HOURS PRN
Qty: 12 TABLET | Refills: 0 | Status: SHIPPED | OUTPATIENT
Start: 2024-05-29 | End: 2024-07-16

## 2024-05-29 RX ORDER — OXYCODONE HYDROCHLORIDE 5 MG/1
5 TABLET ORAL EVERY 6 HOURS PRN
Qty: 12 TABLET | Refills: 0 | Status: SHIPPED | OUTPATIENT
Start: 2024-05-29 | End: 2024-07-16

## 2024-05-29 RX ORDER — SULFAMETHOXAZOLE/TRIMETHOPRIM 400MG-80MG
1 TABLET ORAL DAILY
Status: DISCONTINUED | OUTPATIENT
Start: 2024-05-29 | End: 2024-05-29

## 2024-05-29 RX ORDER — ACETAMINOPHEN 325 MG/1
650 TABLET ORAL EVERY 6 HOURS PRN
Qty: 40 TABLET | Refills: 0 | Status: SHIPPED | OUTPATIENT
Start: 2024-05-29 | End: 2024-07-16

## 2024-05-29 RX ORDER — SULFAMETHOXAZOLE/TRIMETHOPRIM 400MG-80MG
1 TABLET ORAL DAILY
Status: DISCONTINUED | OUTPATIENT
Start: 2024-05-29 | End: 2024-05-30 | Stop reason: HOSPADM

## 2024-05-29 RX ORDER — IBUPROFEN 600 MG/1
600 TABLET, FILM COATED ORAL EVERY 6 HOURS PRN
Status: DISCONTINUED | OUTPATIENT
Start: 2024-05-29 | End: 2024-05-30 | Stop reason: HOSPADM

## 2024-05-29 RX ADMIN — OXYCODONE HYDROCHLORIDE 5 MG: 5 TABLET ORAL at 17:02

## 2024-05-29 RX ADMIN — ACETAMINOPHEN 650 MG: 325 TABLET, FILM COATED ORAL at 20:49

## 2024-05-29 RX ADMIN — IBUPROFEN 600 MG: 600 TABLET, FILM COATED ORAL at 08:45

## 2024-05-29 RX ADMIN — Medication 1 HALF-TAB: at 11:04

## 2024-05-29 RX ADMIN — OXYCODONE HYDROCHLORIDE 5 MG: 5 TABLET ORAL at 11:04

## 2024-05-29 RX ADMIN — BISACODYL 10 MG: 10 SUPPOSITORY RECTAL at 11:14

## 2024-05-29 RX ADMIN — FAMOTIDINE 20 MG: 20 TABLET ORAL at 08:46

## 2024-05-29 RX ADMIN — TRAZODONE HYDROCHLORIDE 50 MG: 50 TABLET ORAL at 21:42

## 2024-05-29 RX ADMIN — ACETAMINOPHEN 650 MG: 325 TABLET, FILM COATED ORAL at 14:48

## 2024-05-29 RX ADMIN — ENOXAPARIN SODIUM 40 MG: 40 INJECTION SUBCUTANEOUS at 11:04

## 2024-05-29 RX ADMIN — ACETAMINOPHEN 650 MG: 325 TABLET, FILM COATED ORAL at 08:46

## 2024-05-29 RX ADMIN — DOCUSATE SODIUM 50 MG AND SENNOSIDES 8.6 MG 2 TABLET: 8.6; 5 TABLET, FILM COATED ORAL at 08:46

## 2024-05-29 ASSESSMENT — ACTIVITIES OF DAILY LIVING (ADL)
ADLS_ACUITY_SCORE: 20
ADLS_ACUITY_SCORE: 20
ADLS_ACUITY_SCORE: 25
ADLS_ACUITY_SCORE: 27
ADLS_ACUITY_SCORE: 20
ADLS_ACUITY_SCORE: 24
ADLS_ACUITY_SCORE: 25
ADLS_ACUITY_SCORE: 25
ADLS_ACUITY_SCORE: 27
ADLS_ACUITY_SCORE: 25
ADLS_ACUITY_SCORE: 25
DEPENDENT_IADLS:: INDEPENDENT
ADLS_ACUITY_SCORE: 25
ADLS_ACUITY_SCORE: 25
ADLS_ACUITY_SCORE: 27
ADLS_ACUITY_SCORE: 25
ADLS_ACUITY_SCORE: 27
ADLS_ACUITY_SCORE: 20
ADLS_ACUITY_SCORE: 20
ADLS_ACUITY_SCORE: 25

## 2024-05-29 NOTE — PHARMACY-ADMISSION MEDICATION HISTORY
Pharmacist Admission Medication History    Admission medication history is complete. The information provided in this note is only as accurate as the sources available at the time of the update.    Information Source(s): Patient and dispense records .    Changes made to PTA medication list:  Added: None  Deleted: None  Changed: None      Medication History Completed By: SIOMARA SHARP [facility] 5/28/2024 7:30 PM    PTA Med List   Medication Sig Last Dose    traZODone (DESYREL) 50 MG tablet Take 1-2 tablets ( mg) by mouth at bedtime 5/27/2024

## 2024-05-29 NOTE — CONSULTS
Care Management Initial Consult    General Information  Assessment completed with: Patient,    Type of CM/SW Visit: Initial Assessment  Primary Care Provider verified and updated as needed: Yes   Readmission within the last 30 days: no previous admission in last 30 days      Reason for Consult: discharge planning  Advance Care Planning: Advance Care Planning Reviewed: no concerns identified       Communication Assessment  Patient's communication style: spoken language (English or Bilingual)    Hearing Difficulty or Deaf: no   Wear Glasses or Blind: no    Cognitive  Cognitive/Neuro/Behavioral: WDL                      Living Environment:   People in home: alone     Current living Arrangements: apartment      Able to return to prior arrangements: yes     Family/Social Support:  Care provided by: self  Provides care for: no one  Marital Status: Single  Sibling(s), Children          Description of Support System: Supportive    Support Assessment: Adequate family and caregiver support, Adequate social supports    Current Resources:   Patient receiving home care services: No  Community Resources: None  Equipment currently used at home: none  Supplies currently used at home: None    Employment/Financial:  Employment Status: retired     Financial Concerns: none   Referral to Financial Worker: No     Does the patient's insurance plan have a 3 day qualifying hospital stay waiver?  No    Lifestyle & Psychosocial Needs: (pulled from chart)  Social Determinants of Health     Food Insecurity: Not on file   Depression: Not on file   Housing Stability: Not on file   Tobacco Use: High Risk (4/25/2024)    Patient History     Smoking Tobacco Use: Every Day     Smokeless Tobacco Use: Never     Passive Exposure: Not on file   Financial Resource Strain: Not on file   Alcohol Use: Not on file   Transportation Needs: Not on file   Physical Activity: Not on file   Interpersonal Safety: Not on file   Stress: Not on file   Social Connections:  Not on file   Health Literacy: Not on file       Functional Status:  Prior to admission patient needed assistance:   Dependent ADLs:: Independent  Dependent IADLs:: Independent    Mental Health Status:  Mental Health Status: No Current Concerns       Chemical Dependency Status:  Chemical Dependency Status: No Current Concerns             Values/Beliefs:  Spiritual, Cultural Beliefs, Mu-ism Practices, Values that affect care: no               Additional Information:  Pt is a 64 year old female w/ PMH of SCC of the vulva now POD1 s/p radical excision of R vulvar lesion, bilateral pelvic and R obturator lymph node excision and cervical biopsies.     RNCC completed CM assessment due to request by primary team to assist with getting pt set up with home care. RNCC met with pt at bedside and introduced RNCC role. Pt states she lives alone in an apartment that has elevator access. Pt is fully independent with all ADLs and IADLs and does not use any assistive devices or home services. Pt states she has support from people in her apartment complex and has family and friends nearby if needed. Pt is retired on social security. Pt denies any financial concerns. Family will provide discharge transportation when pt is medically ready.    RNCC spoke with pt regarding getting home care SN to assist with RADHA, galarza, and vulvar cares. Pt states she plans on discharging to her sisters home in [place], [place]. Pt does not believe home care is a good idea as pt's sister has large dogs. Pt also stated her sisters will help her with cares and does not feel home care is necessary currently.    Pt states she does not have a PCP but was recommended Dr. Brooke Li and Daxa Palma at the [facility]. Pt asked RNCC to assist her with setting up an appointment. RNCC called the scheduling line and soonest availability was in July. RNCC has pt set up with Dr. Brooke Li on 7/16/24 at 1:40pm. RNCC also made note of having provider follow up  with pt regarding wanting home care once she has returned home. RNCC will continue to follow and assist with other discharge needs as they arise.     Bora Gaspar RN BSN  5A RN Care Coordinator   Ph: [phone]  Pager: [phone]

## 2024-05-29 NOTE — PLAN OF CARE
Pt arrived from PACU approx 1800. A/Ox4. VSS on RA, on capno. Dangled at bedside. Regular diet, tolerating fluids and yogurt well. Pain well controlled with PRN oxycodone x1. IVMF at 75mL/hr most of shift, then discontinued. PIV SL. Valera with adequate urine output. BL groin incisions with kerlix fluffs and mesh underwear in place and two lap sites, kerlix changed x1. RADHA x2 to bulb suction, minimal output, stripped. Incentive spirometer given.    __      Nursing Focus: Admission    D: Patient admitted/transferred from PACU.    I: Upon arrival to the unit patient was oriented to room, unit, and call light. Patient s vital signs were obtained. Adult AVS completed. Head to toe assessment completed. Education assessment completed. Care plan initiated.     A: Post op VSS. Patient rates pain at 0/10. Two RN skin assessment completed. Second RN was Elie SUERO RN. Significant Skin Findings include BL groin incisions, Jpx2, lap sites, PIVx1.     P: Continue with plan of care. Notify MD with any concerns or changes in patient status.

## 2024-05-29 NOTE — PROVIDER NOTIFICATION
Paged gyn onc at 0232. Pt's HR intermittently dropping upper 40s while sleeping. Lowest HR noted was 47 bpm.

## 2024-05-29 NOTE — PROGRESS NOTES
[id number]  Vitals: Vitals stable on room air. Pt declined capno, continuous pulse ox in place.   Neuro: A&Ox4  Pain/Nausea: Pain managed with scheduled Tylenol and PRN Ibuprofen/Oxycodone. No nausea  Activity: SBA  GI: Passing gas, BM x1  : Valera in place with borderline urine output, team informed - no new orders  Diet: Regular, poor appetite  Incisions/Drains: Lap sites with primapore, CDI. Bilateral groin incisions CDI. Bilateral RADHA with bloody/serosanguinous output, stripped per orders and dressings changed. Hailey cares completed per orders, done twice this shift.  IV: PIV SL  New changes this shift: Valera, RADHA, and perineal care education started.   Plan: Discharge TBD

## 2024-05-29 NOTE — PLAN OF CARE
Occupational Therapy: Orders received. Chart reviewed and discussed with care team.? Occupational Therapy not indicated due to pt with no acute OT needs, mobilizing and completing ADL w/ SBA, no cognition concerns. PT following to progress functional strength/endurance and safety with mobility.? Defer discharge recommendations to Physical Therapy.? Will complete orders.

## 2024-05-29 NOTE — PROGRESS NOTES
"   05/29/24 0912   Appointment Info   Signing Clinician's Name / Credentials (PT) EDA Barrientos   Student Supervision Direct supervision provided;Therapy services provided with the co-signing licensed therapist guiding and directing the services, and providing the skilled judgement and assessment throughout the session   Rehab Comments (PT) PT only, no sitting directly on bottom       Present no   Living Environment   People in Home sibling(s)   Current Living Arrangements house   Home Accessibility stairs to enter home;stairs within home   Number of Stairs, Main Entrance 1   Stair Railings, Main Entrance railings on both sides of stairs   Number of Stairs, Within Home, Primary five   Stair Railings, Within Home, Primary railings on both sides of stairs   Transportation Anticipated family or friend will provide   Living Environment Comments Pt will be staying with her sister in a house with 1 RUSTY, 5 steps to get to bedroom. Sister will be able to provide assist when not at work.   Self-Care   Usual Activity Tolerance good   Current Activity Tolerance moderate   Regular Exercise No   Equipment Currently Used at Home none   Fall history within last six months no   Activity/Exercise/Self-Care Comment Pt IND with mobility and ADLs at baseline. Does not use AD but owns 4WW. [email or web address]   General Information   Onset of Illness/Injury or Date of Surgery 05/28/24   Referring Physician Tee Reyes MD   Patient/Family Therapy Goals Statement (PT) Return home   Pertinent History of Current Problem (include personal factors and/or comorbidities that impact the POC) Per EMR \"64 year old s/p radical excision R vulvar lesion, bilateral pelvic and R obturator lymph node excision, cervical biopsies\"   Existing Precautions/Restrictions other (see comments)  (No sitting directly on bottom)   Weight-Bearing Status - LUE full weight-bearing "   Weight-Bearing Status - RUE full weight-bearing   Weight-Bearing Status - LLE full weight-bearing   Weight-Bearing Status - RLE full weight-bearing   Cognition   Affect/Mental Status (Cognition) WNL   Orientation Status (Cognition) oriented x 4   Follows Commands (Cognition) WNL   Pain Assessment   Patient Currently in Pain No   Integumentary/Edema   Integumentary/Edema no deficits were identifed   Posture    Posture Forward head position;Protracted shoulders   Range of Motion (ROM)   Range of Motion ROM is WFL   Strength (Manual Muscle Testing)   Strength (Manual Muscle Testing) strength is WFL   Bed Mobility   Bed Mobility supine-sit   Comment, (Bed Mobility) SBA supine to sit   Transfers   Comment, (Transfers) STS from EOB SBA   Gait/Stairs (Locomotion)   [place] Level (Gait) contact guard   Distance in Feet (Gait) 10   Comment, (Gait/Stairs) CGA for gait and stairs   Balance   Balance other (describe)   Balance Comments Pt reports slight balance deficits, no LOB throughout session   Sensory Examination   Sensory Perception patient reports no sensory changes   Coordination   Coordination no deficits were identified   Muscle Tone   Muscle Tone no deficits were identified   Clinical Impression   Criteria for Skilled Therapeutic Intervention Yes, treatment indicated   PT Diagnosis (PT) Impaired activity tolerance   Influenced by the following impairments SOB   Clinical Presentation (PT Evaluation Complexity) stable   Clinical Presentation Rationale Clinical judgement   Clinical Decision Making (Complexity) low complexity   Planned Therapy Interventions (PT) balance training;bed mobility training;gait training;ROM (range of motion);stair training;strengthening;transfer training   Risk & Benefits of therapy have been explained evaluation/treatment results reviewed;care plan/treatment goals reviewed;risks/benefits reviewed;current/potential barriers reviewed;participants voiced agreement with care  plan;participants included;patient   PT Total Evaluation Time   PT Eval, Low Complexity Minutes (89401) 10   Physical Therapy Goals   PT Frequency 5x/week   PT Predicted Duration/Target Date for Goal Attainment 06/05/24   PT Goals Bed Mobility;Transfers;Gait;Stairs   PT: Bed Mobility Independent   PT: Transfers Independent   PT: Gait Independent   PT: Stairs Independent;5 stairs;Rail on both sides   Interventions   Interventions Quick Adds Gait Training;Therapeutic Activity   Therapeutic Activity   Therapeutic Activities: dynamic activities to improve functional performance Minutes (43262) 8   Symptoms Noted During/After Treatment None   Treatment Detail/Skilled Intervention Pt supine in bed upon arrival, nursing present, agreeable to therapy, RN ok'd mobility. Eval completed. Pt on RA, VSS. To improve functional independence, pt completed bed mobility and transfers. Stand to sit EOB SBA. Pivot bed to chair to pts L SBA. PT and pt discussed discharge rec of home with assist, pt agreeable. All questions answered. Pt left seated in chair, medical team present, call light in reach, all needs met.   Gait Training   Gait Training Minutes (23826) 12   Symptoms Noted During/After Treatment (Gait Training) shortness of breath;fatigue   Treatment Detail/Skilled Intervention To improve functional mobility pt ambulated in winn and completed step ups. Pt amb ~300' in winn CGA, no AD. Good speed, short step length, slightly unsteady but able to correct with setting IND. Required ~2 min seated break after walk due to SOB. Pt completed 10 step ups with R rail SBA to simulate stair completion. Pt reports no concerns with completing stairs at home. Pt educated on walking program, verbalized understanding. Pt left seated in chair, medical team present, call light in reach, all needs met.   PT Discharge Planning   PT Plan Dynamic balance, progress gait no AD   PT Discharge Recommendation (DC Rec) home with assist   PT Rationale for DC  Rec Pt currently mobilizing up to CGA. Pt safe to return home with assist as needed. Pt will be staying with her sister who will be able to provide level of assist required.   PT Brief overview of current status SBA   Total Session Time   Timed Code Treatment Minutes 20   Total Session Time (sum of timed and untimed services) 30

## 2024-05-29 NOTE — PROVIDER NOTIFICATION
5208 M.K. Pt low BP 98/62 HR 60. Alert & awake, denies dizziness & pain. Small amount of bright red vaginal bleeding. Kimmy BRAGG 5A.

## 2024-05-29 NOTE — PLAN OF CARE
POD1: radical excision of R-vulvar lesion, bilateral inguinal LN excision.  A&Ox4.   Bradycardic into 30s, hypotensive 93/61 - team notified & aware, no new orders at this time.  OVSS on RA. Cont capno in place.   Dangled at bedside on zelalem shift, no OOB overnight.  Pain 2/10, Pt declined offered meds.   Denies nausea.   Tolerating regular diet. Fluid intake encouraged.   PIV SL.   JPx2 to bulb suction, stripped x1, small amount of bright red drainage.  BL groin incisions w/kerlix mesh underwear changed x1 w/bright red drainage.  LAP sites ROBERTA.   Valera patent w/AUOP.  Pt slept between cares.  Cont POC.

## 2024-05-29 NOTE — DISCHARGE SUMMARY
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 Removal of left and right pelvic lymph nodes. Removal of right obturator lymph nodes. Biopsies of cervix.     Prior to Admission Medications:  Medications Prior to Admission   Medication Sig Dispense Refill Last Dose    traZODone (DESYREL) 50 MG tablet Take 1-2 tablets ( mg) by mouth at bedtime 60 tablet 1 5/27/2024       Discharge Medications:       Review of your medicines        START taking        Dose / Directions   acetaminophen 325 MG tablet  Commonly known as: TYLENOL      Dose: 650 mg  Take 2 tablets (650 mg) by mouth every 6 hours as needed for mild pain  Quantity: 40 tablet  Refills: 0     ibuprofen 600 MG tablet  Commonly known as: ADVIL/MOTRIN      Dose: 600 mg  Take 1 tablet (600 mg) by mouth every 6 hours as needed for inflammatory pain or mild pain  Quantity: 12 tablet  Refills: 0     oxyCODONE 5 MG tablet  Commonly known as: ROXICODONE      Dose: 5 mg  Take 1 tablet (5 mg) by mouth every 6 hours as needed for severe pain  Quantity: 12 tablet  Refills: 0     senna-docusate 8.6-50 MG tablet  Commonly known as: SENOKOT-S/PERICOLACE      Dose: 2 tablet  Take 2 tablets by mouth 2 times daily as needed for constipation  Quantity: 60 tablet  Refills: 0     sulfamethoxazole-trimethoprim 400-80 MG tablet  Commonly known as: BACTRIM      Dose: 0.5  tablet  Take 0.5 tablets by mouth daily  Quantity: 4 tablet  Refills: 0            CONTINUE these medicines which have NOT CHANGED        Dose / Directions   traZODone 50 MG tablet  Commonly known as: DESYREL  Used for: Insomnia, unspecified type      Dose:  mg  Take 1-2 tablets ( mg) by mouth at bedtime  Quantity: 60 tablet  Refills: 1               Where to get your medicines        These medications were sent to [facility] - [place], [place] - [address] [place] [address]  [address] [facility], [facility] [address]      Phone: [phone]   acetaminophen 325 MG tablet  ibuprofen 600 MG tablet  oxyCODONE 5 MG tablet  senna-docusate 8.6-50 MG tablet  sulfamethoxazole-trimethoprim 400-80 MG tablet         Consultations:  None    Brief History of Illness:  Regina Gaspar is a 63 year old woman with a new diagnosis of vulvar cancer. A PET scan was performed and an additional positive lymph node was noted in the R obutrator space. Recommendation continues to be a radical vulvectomy, right inguinal lymphadenectomy, left sentinel LN biopsy, and additionally a diagnostic laparoscopy.     Hospital Course:  Dz:   - Preoperative diagnosis was vulvar cancer.  Final pathology is pending at the time of discharge. She was given Vulvar care, RADHA drain, and galarza cath care instructions prior to discharge. She will follow-up postoperatively 6/6/24 for a care plan.  FEN:   - Patient was tolerating PO intake and her diet was slowly advanced.  By discharge, she was tolerating a regular diet without nausea and vomiting and able to maintain her hydration without IVF supplementation.  Pain:   - Her pain was well controlled on PO medications, and she was discharged home with these medications.  CV:   - She has no history of CV issues.  Her vital signs were stable while in house and she had no acute CV issues.  PULM:   - Patient with a history of tobacco use disorder and nicotine replacement was ordered for her. She  was initially given O2 supplementation in to maintain her O2 sats in the immediate postop period and was transitioned off of this without difficulty.  By discharge, her O2 sats were greater than 94% on RA. She was encouraged to use her bedside IS while in house. She had no acute pulmonary issues while in house.  HEME:   - Her preoperative Hgb was 15.4.  Her hgb dropped to 13.3 postoperatively.  She had no other acute heme issues while in house.  GI:   - She was made NPO prior to the procedure.  On POD#0, her diet was advanced slowly as tolerated.  At the time of discharge, she was tolerating a regular diet without nausea and vomiting.  She will be discharged with a bowel regimen to prevent constipation in the postoperative period.  She had no acute GI issues while in house.  :    - A galarza catheter was placed at the time of the surgery with the plan to leave in place until 6/6/24. PPX antibiotics per Dr Roche's recommendations while galarza in place. She had no acute  issues while in house.  ID:   - The patient was AF during her hospitalization.  She received standard preoperative antibiotics without incident. PPX antibiotics to continue while galarza cath in place.    ENDO:   - no issues  PSYCH/NEURO:   - no issues  PPX:    - She was given SCDs, IS, and lovenox during her hospital course.  She tolerated these prophylactic interventions without incident and they were discontinued at discharge.    Discharge Instructions and Follow up:  Ms. Regina Gaspar was discharged from the hospital with follow up for 6/6 with Dr. Roche    Discharge Diet: Regular  Discharge Activity: Activity as tolerated  Discharge Follow up:   Staple/Galarza Removal 6/6 with Dr. Roche    Discharge Disposition:  Discharged to home    Discharge Staff: MD Ruthy Meng, APRN, DNP, CNP  5/30/2024 9:37 AM

## 2024-05-30 ENCOUNTER — APPOINTMENT (OUTPATIENT)
Dept: PHYSICAL THERAPY | Facility: CLINIC | Age: 64
DRG: 734 | End: 2024-05-30
Attending: OBSTETRICS & GYNECOLOGY
Payer: MEDICARE

## 2024-05-30 VITALS
OXYGEN SATURATION: 94 % | DIASTOLIC BLOOD PRESSURE: 69 MMHG | RESPIRATION RATE: 16 BRPM | HEIGHT: 68 IN | SYSTOLIC BLOOD PRESSURE: 110 MMHG | HEART RATE: 67 BPM | BODY MASS INDEX: 22.02 KG/M2 | TEMPERATURE: 97.6 F | WEIGHT: 145.28 LBS

## 2024-05-30 PROCEDURE — 97116 GAIT TRAINING THERAPY: CPT | Mod: GP

## 2024-05-30 PROCEDURE — 250N000013 HC RX MED GY IP 250 OP 250 PS 637: Performed by: NURSE PRACTITIONER

## 2024-05-30 PROCEDURE — 250N000013 HC RX MED GY IP 250 OP 250 PS 637

## 2024-05-30 RX ORDER — SULFAMETHOXAZOLE AND TRIMETHOPRIM 400; 80 MG/1; MG/1
0.5 TABLET ORAL DAILY
Qty: 4 TABLET | Refills: 0 | Status: SHIPPED | OUTPATIENT
Start: 2024-05-30 | End: 2024-07-16

## 2024-05-30 RX ADMIN — FAMOTIDINE 20 MG: 20 TABLET ORAL at 08:43

## 2024-05-30 RX ADMIN — Medication 1 HALF-TAB: at 10:32

## 2024-05-30 RX ADMIN — ACETAMINOPHEN 650 MG: 325 TABLET, FILM COATED ORAL at 08:43

## 2024-05-30 RX ADMIN — OXYCODONE HYDROCHLORIDE 5 MG: 5 TABLET ORAL at 03:54

## 2024-05-30 RX ADMIN — ACETAMINOPHEN 650 MG: 325 TABLET, FILM COATED ORAL at 03:54

## 2024-05-30 ASSESSMENT — ACTIVITIES OF DAILY LIVING (ADL)
ADLS_ACUITY_SCORE: 24
ADLS_ACUITY_SCORE: 23
ADLS_ACUITY_SCORE: 24
ADLS_ACUITY_SCORE: 23
ADLS_ACUITY_SCORE: 23
ADLS_ACUITY_SCORE: 24
ADLS_ACUITY_SCORE: 23
ADLS_ACUITY_SCORE: 24

## 2024-05-30 NOTE — PROGRESS NOTES
Brief Gyn/Onc Note    In to see patient for PM check in.     Patient reports she feels very well without any pain. Had a cheeseburger today. Denies any nausea or vomiting. Ambulating without dizziness. Galarza and Drains X2 still in place.     Vitals:    05/29/24 0845 05/29/24 1100 05/29/24 1200 05/29/24 1532   BP: 108/62   101/52   BP Location: Right arm   Left arm   Pulse: 59   58   Resp:    16   Temp: 98.1  F (36.7  C)   98.1  F (36.7  C)   TempSrc: Oral   Oral   SpO2: 99% 100% 98% 100%   Weight: 65.9 kg (145 lb 4.5 oz)      Height:         Gen: Comfortable appearing   CV: Well perfused  Pulm: Non labored breathing      UOP: 325 since 1500 (0.8cc/kg/hr)   Drain1 Right: 55 total today  Drain2 Left: 40 total today    Ms. Regina Gaspar is a 64 year old POD# 1 s/p radical excision R vulvar lesion, bilateral pelvic and R obturator lymph node excision, cervical biopsies doing well in the postoperative period. Pain well controlled. Urine output adequate and plan for discharge home tomorrow with drains and galarza in place.   - Plan for galarza and staple removal 6/6  - Remove drains once <20 cc per day   - Scheduled Pain regimen    Silvana Higuera MD  OB/GYN Resident, PGY-3

## 2024-05-30 NOTE — PROGRESS NOTES
Care Management Discharge Note    Discharge Date: 05/30/2024  Discharge Disposition: Home  Discharge Services: None  Discharge DME: None  Discharge Transportation: family or friend will provide, car, drives self  Private pay costs discussed: Not applicable  Does the patient's insurance plan have a 3 day qualifying hospital stay waiver?  No  PAS Confirmation Code: NA   Patient/family educated on Medicare website which has current facility and service quality ratings: no  Education Provided on the Discharge Plan: Yes  Persons Notified of Discharge Plans: Patient  Patient/Family in Agreement with the Plan: yes    Handoff Referral Completed: Yes    Additional Information:  Per morning huddle, pt will be medically ready for discharge today. Family will provide discharge transportation. No discharge needs identified.    Bora Gaspar RN BSN  5A RN Care Coordinator   Ph: [phone]  Pager: [phone]

## 2024-05-30 NOTE — PROGRESS NOTES
"Gynecology Oncology Progress Note  May 30, 2024    Ms. Regina Gaspar is a 64 year old POD# 2 s/p radical excision R vulvar lesion, bilateral pelvic and R obturator lymph node excision, cervical biopsies    Dz: SCC of the vulva    24 hour events:   - PO intake w/o n/v  - ambulating  - pain controlled    Subjective: Having no pain currently. Did need some oxycodone last night, which she attributes to being sore from walking around. Tolerating PO fluids without nausea or vomiting, not yet eating. Passing flatus. Valera in place. Denies fevers, chills, chest pain, SOB, headaches, dizziness. No other questions or concerns.    Objective:   BP 98/59 (BP Location: Left arm)   Pulse 66   Temp 98.3  F (36.8  C) (Oral)   Resp 16   Ht 1.727 m (5' 8\")   Wt 65.9 kg (145 lb 4.5 oz)   LMP  (LMP Unknown)   SpO2 95%   BMI 22.09 kg/m      General: in NAD  CV: WWP  Resp: no increased work of breathing  Abdomen: soft, non tender, tympanitic  Abd Incisions: c/d/I, covered with bandages, no shadowing  Vulva: Mild swelling of R vulva. Incision intact. Not actively bleeding. Small amount of blood on fluffs.  Extremities: nontender, trace edema bilaterally  Lines/Drains: Valera catheter, bilateral inguinal RADHA drains with scant serosanguinous fluid    I/Os    Intake/Output Summary (Last 24 hours) at 5/30/2024 0640  Last data filed at 5/30/2024 0359  Gross per 24 hour   Intake 240 ml   Output 1260 ml   Net -1020 ml         New labs/imaging-  Ancillary Procedure on 04/29/2024   Component Date Value Ref Range Status    Glucose 04/29/2024 71  70 - 99 mg/dL Final     Assessment/Plan:     #S/p radical excision R vulvar lesion, bilateral pelvic and R obturator lymph node excision, cervical biopsies  Doing well postoperatively, meeting all postoperative goals  - Pain: Tylenol, ibuprofen, oxycodone PRN  - Diet advanced with no concerns  - Valera catheter for one week, bactrim ppx  - Perineal cares ordered  - Ppx: SCDs, IS, " Lovenox    #Bradycardia  Patient was bradycardic yesterday; however has resolved. Asymptomatic    Disposition: Discharge today    Tee Reyes MD  Obstetrics and Gynecology, PGY-1  05/30/2024 6:39 AM    I have examined this patient personally with the team and agree with the above findings and plan.   WOund and drain care explained    Javier Roche

## 2024-05-30 NOTE — PLAN OF CARE
Physical Therapy Discharge Summary    Reason for therapy discharge:    Discharged to home.    Progress towards therapy goal(s). See goals on Care Plan in [facility] electronic health record for goal details.  Goals met    Therapy recommendation(s):    No further therapy is recommended.

## 2024-05-30 NOTE — PLAN OF CARE
POD#2: radical excision of R-vulvar lesion, bilateral inguinal LN excision.  Dressing intact w/scant drainage noted.   LAP sites x2-CDI.   JPx2, sanguinous o/p. Dressing changed scant drainage at insertion site.  A&Ox4.  AVSS on RA.   Pain controlled w/tylenol & oxy given x1.   Denies nausea. Tolerating regular diet.  SBA, steady.  Valera patent w/AUOP.   Passing flatus.  Hyperactive BS.  LBM yesterday.  Cont POC.

## 2024-05-30 NOTE — PLAN OF CARE
Goal Outcome Evaluation:      Plan of Care Reviewed With: patient    Overall Patient Progress: improving    POD#1. A&Ox4, AVSS, RA. Pain controlled w/ scheduled tylenol, oxy x1. RADHA drains stripped x2, serosang output. Patient educated on emptying/stripping drains, pericare, and galarza care. Hailey care and kerlix change done x2 independently. Galarza draining AUOP. Lap sites x2 CDI. Walk x2 this shift. (+) flatus, (+) BM. Continue w/ POC.

## 2024-05-30 NOTE — PLAN OF CARE
Goal Outcome Evaluation:    VSS. Denies pain. Discharge instructions reviewed and verbalized understanding of all home RADHA/Valera/Perineal cares. Supplies provided. Discharged to home.                          Unknown

## 2024-06-03 ENCOUNTER — PATIENT OUTREACH (OUTPATIENT)
Dept: ONCOLOGY | Facility: CLINIC | Age: 64
End: 2024-06-03
Payer: MEDICARE

## 2024-06-03 NOTE — PROGRESS NOTES
[facility]: Transitions of Care Note  Chief Complaint   Patient presents with    Clinic Care Coordination - Post Hospital     Post-op call     Most Recent Admission Date: 5/28/2024   Most Recent Admission Diagnosis: Malignant neoplasm of vulva (H) - C51.9     Most Recent Discharge Date: 5/30/2024   Most Recent Discharge Diagnosis: Vulvar cancer (H) - C51.9      RN attempted reaching patient via telephone to discuss how she is doing following her recent surgery with Dr. Roche.  Outreach attempts were made x 3, with voicemail messages left requesting callback from patient.  No callback received yet.      Confirmed patient has post-op appointment scheduled with Dr. Roche for later this week, with possible galarza catheter removal.    Closing out encounter at this time.      Future Appointments   Date Time Provider Department Center   6/6/2024  2:30 PM Javier Roche MD [facility]   7/16/2024  2:00 PM Brooke Li PA-C [facility]     Daquan Cramer, RN, BSN, OCN  RN Care Coordinator - Oncology  [facility]

## 2024-06-06 ENCOUNTER — ONCOLOGY VISIT (OUTPATIENT)
Dept: ONCOLOGY | Facility: CLINIC | Age: 64
End: 2024-06-06
Attending: OBSTETRICS & GYNECOLOGY
Payer: MEDICARE

## 2024-06-06 ENCOUNTER — PATIENT OUTREACH (OUTPATIENT)
Dept: ONCOLOGY | Facility: CLINIC | Age: 64
End: 2024-06-06
Payer: MEDICARE

## 2024-06-06 VITALS
HEIGHT: 68 IN | RESPIRATION RATE: 16 BRPM | WEIGHT: 145.7 LBS | SYSTOLIC BLOOD PRESSURE: 126 MMHG | DIASTOLIC BLOOD PRESSURE: 85 MMHG | BODY MASS INDEX: 22.08 KG/M2 | HEART RATE: 98 BPM | TEMPERATURE: 98.2 F | OXYGEN SATURATION: 97 %

## 2024-06-06 DIAGNOSIS — C51.9 VULVAR CANCER (H): Primary | ICD-10-CM

## 2024-06-06 LAB
PATH REPORT.COMMENTS IMP SPEC: ABNORMAL
PATH REPORT.COMMENTS IMP SPEC: ABNORMAL
PATH REPORT.COMMENTS IMP SPEC: YES
PATH REPORT.FINAL DX SPEC: ABNORMAL
PATH REPORT.GROSS SPEC: ABNORMAL
PATH REPORT.INTRAOP OBS SPEC DOC: ABNORMAL
PATH REPORT.MICROSCOPIC SPEC OTHER STN: ABNORMAL
PATH REPORT.RELEVANT HX SPEC: ABNORMAL
PATHOLOGY SYNOPTIC REPORT: ABNORMAL
PHOTO IMAGE: ABNORMAL

## 2024-06-06 PROCEDURE — G0463 HOSPITAL OUTPT CLINIC VISIT: HCPCS | Performed by: OBSTETRICS & GYNECOLOGY

## 2024-06-06 PROCEDURE — 99024 POSTOP FOLLOW-UP VISIT: CPT | Performed by: OBSTETRICS & GYNECOLOGY

## 2024-06-06 RX ORDER — EPINEPHRINE 1 MG/ML
0.3 INJECTION, SOLUTION INTRAMUSCULAR; SUBCUTANEOUS EVERY 5 MIN PRN
Status: CANCELLED | OUTPATIENT
Start: 2024-09-24

## 2024-06-06 RX ORDER — ALBUTEROL SULFATE 90 UG/1
1-2 AEROSOL, METERED RESPIRATORY (INHALATION)
Status: CANCELLED
Start: 2024-09-23

## 2024-06-06 RX ORDER — HEPARIN SODIUM (PORCINE) LOCK FLUSH IV SOLN 100 UNIT/ML 100 UNIT/ML
5 SOLUTION INTRAVENOUS
Status: CANCELLED | OUTPATIENT
Start: 2024-09-24

## 2024-06-06 RX ORDER — HEPARIN SODIUM,PORCINE 10 UNIT/ML
5-20 VIAL (ML) INTRAVENOUS DAILY PRN
Status: CANCELLED | OUTPATIENT
Start: 2024-09-23

## 2024-06-06 RX ORDER — ALBUTEROL SULFATE 0.83 MG/ML
2.5 SOLUTION RESPIRATORY (INHALATION)
Status: CANCELLED | OUTPATIENT
Start: 2024-09-03

## 2024-06-06 RX ORDER — DIPHENHYDRAMINE HYDROCHLORIDE 50 MG/ML
50 INJECTION INTRAMUSCULAR; INTRAVENOUS
Status: CANCELLED
Start: 2024-09-23

## 2024-06-06 RX ORDER — PALONOSETRON 0.05 MG/ML
0.25 INJECTION, SOLUTION INTRAVENOUS ONCE
Status: CANCELLED | OUTPATIENT
Start: 2024-09-23

## 2024-06-06 RX ORDER — MEPERIDINE HYDROCHLORIDE 25 MG/ML
25 INJECTION INTRAMUSCULAR; INTRAVENOUS; SUBCUTANEOUS EVERY 30 MIN PRN
Status: CANCELLED | OUTPATIENT
Start: 2024-09-03

## 2024-06-06 RX ORDER — DEXTROSE, SODIUM CHLORIDE, AND POTASSIUM CHLORIDE 5; .45; .15 G/100ML; G/100ML; G/100ML
2000 INJECTION INTRAVENOUS ONCE
Status: CANCELLED
Start: 2024-09-23 | End: 2024-07-14

## 2024-06-06 RX ORDER — DIPHENHYDRAMINE HYDROCHLORIDE 50 MG/ML
50 INJECTION INTRAMUSCULAR; INTRAVENOUS
Status: CANCELLED
Start: 2024-08-19

## 2024-06-06 RX ORDER — HEPARIN SODIUM (PORCINE) LOCK FLUSH IV SOLN 100 UNIT/ML 100 UNIT/ML
5 SOLUTION INTRAVENOUS
Status: CANCELLED | OUTPATIENT
Start: 2024-08-19

## 2024-06-06 RX ORDER — METHYLPREDNISOLONE SODIUM SUCCINATE 125 MG/2ML
125 INJECTION, POWDER, LYOPHILIZED, FOR SOLUTION INTRAMUSCULAR; INTRAVENOUS
Status: CANCELLED
Start: 2024-09-10

## 2024-06-06 RX ORDER — HEPARIN SODIUM (PORCINE) LOCK FLUSH IV SOLN 100 UNIT/ML 100 UNIT/ML
5 SOLUTION INTRAVENOUS
Status: CANCELLED | OUTPATIENT
Start: 2024-09-03

## 2024-06-06 RX ORDER — DIPHENHYDRAMINE HYDROCHLORIDE 50 MG/ML
50 INJECTION INTRAMUSCULAR; INTRAVENOUS
Status: CANCELLED
Start: 2024-09-24

## 2024-06-06 RX ORDER — HEPARIN SODIUM,PORCINE 10 UNIT/ML
5-20 VIAL (ML) INTRAVENOUS DAILY PRN
Status: CANCELLED | OUTPATIENT
Start: 2024-09-17

## 2024-06-06 RX ORDER — HEPARIN SODIUM,PORCINE 10 UNIT/ML
5-20 VIAL (ML) INTRAVENOUS DAILY PRN
Status: CANCELLED | OUTPATIENT
Start: 2024-08-19

## 2024-06-06 RX ORDER — DIPHENHYDRAMINE HYDROCHLORIDE 50 MG/ML
50 INJECTION INTRAMUSCULAR; INTRAVENOUS
Status: CANCELLED
Start: 2024-09-17

## 2024-06-06 RX ORDER — ALBUTEROL SULFATE 0.83 MG/ML
2.5 SOLUTION RESPIRATORY (INHALATION)
Status: CANCELLED | OUTPATIENT
Start: 2024-09-17

## 2024-06-06 RX ORDER — EPINEPHRINE 1 MG/ML
0.3 INJECTION, SOLUTION INTRAMUSCULAR; SUBCUTANEOUS EVERY 5 MIN PRN
Status: CANCELLED | OUTPATIENT
Start: 2024-09-10

## 2024-06-06 RX ORDER — EPINEPHRINE 1 MG/ML
0.3 INJECTION, SOLUTION INTRAMUSCULAR; SUBCUTANEOUS EVERY 5 MIN PRN
Status: CANCELLED | OUTPATIENT
Start: 2024-09-17

## 2024-06-06 RX ORDER — HEPARIN SODIUM,PORCINE 10 UNIT/ML
5-20 VIAL (ML) INTRAVENOUS DAILY PRN
Status: CANCELLED | OUTPATIENT
Start: 2024-09-24

## 2024-06-06 RX ORDER — ALBUTEROL SULFATE 0.83 MG/ML
2.5 SOLUTION RESPIRATORY (INHALATION)
Status: CANCELLED | OUTPATIENT
Start: 2024-08-19

## 2024-06-06 RX ORDER — DEXTROSE, SODIUM CHLORIDE, AND POTASSIUM CHLORIDE 5; .45; .15 G/100ML; G/100ML; G/100ML
2000 INJECTION INTRAVENOUS ONCE
Status: CANCELLED
Start: 2024-09-24 | End: 2024-07-07

## 2024-06-06 RX ORDER — METHYLPREDNISOLONE SODIUM SUCCINATE 125 MG/2ML
125 INJECTION, POWDER, LYOPHILIZED, FOR SOLUTION INTRAMUSCULAR; INTRAVENOUS
Status: CANCELLED
Start: 2024-08-19

## 2024-06-06 RX ORDER — HEPARIN SODIUM,PORCINE 10 UNIT/ML
5-20 VIAL (ML) INTRAVENOUS DAILY PRN
Status: CANCELLED | OUTPATIENT
Start: 2024-09-10

## 2024-06-06 RX ORDER — PALONOSETRON 0.05 MG/ML
0.25 INJECTION, SOLUTION INTRAVENOUS ONCE
Status: CANCELLED | OUTPATIENT
Start: 2024-08-26

## 2024-06-06 RX ORDER — MEPERIDINE HYDROCHLORIDE 25 MG/ML
25 INJECTION INTRAMUSCULAR; INTRAVENOUS; SUBCUTANEOUS EVERY 30 MIN PRN
Status: CANCELLED | OUTPATIENT
Start: 2024-09-10

## 2024-06-06 RX ORDER — EPINEPHRINE 1 MG/ML
0.3 INJECTION, SOLUTION INTRAMUSCULAR; SUBCUTANEOUS EVERY 5 MIN PRN
Status: CANCELLED | OUTPATIENT
Start: 2024-09-23

## 2024-06-06 RX ORDER — DEXTROSE, SODIUM CHLORIDE, AND POTASSIUM CHLORIDE 5; .45; .15 G/100ML; G/100ML; G/100ML
2000 INJECTION INTRAVENOUS ONCE
Status: CANCELLED
Start: 2024-08-19 | End: 2024-06-09

## 2024-06-06 RX ORDER — ALBUTEROL SULFATE 90 UG/1
1-2 AEROSOL, METERED RESPIRATORY (INHALATION)
Status: CANCELLED
Start: 2024-09-03

## 2024-06-06 RX ORDER — DIPHENHYDRAMINE HYDROCHLORIDE 50 MG/ML
50 INJECTION INTRAMUSCULAR; INTRAVENOUS
Status: CANCELLED
Start: 2024-09-03

## 2024-06-06 RX ORDER — METHYLPREDNISOLONE SODIUM SUCCINATE 125 MG/2ML
125 INJECTION, POWDER, LYOPHILIZED, FOR SOLUTION INTRAMUSCULAR; INTRAVENOUS
Status: CANCELLED
Start: 2024-09-23

## 2024-06-06 RX ORDER — ALBUTEROL SULFATE 0.83 MG/ML
2.5 SOLUTION RESPIRATORY (INHALATION)
Status: CANCELLED | OUTPATIENT
Start: 2024-09-10

## 2024-06-06 RX ORDER — PALONOSETRON 0.05 MG/ML
0.25 INJECTION, SOLUTION INTRAVENOUS ONCE
Status: CANCELLED | OUTPATIENT
Start: 2024-09-24

## 2024-06-06 RX ORDER — DIPHENHYDRAMINE HYDROCHLORIDE 50 MG/ML
50 INJECTION INTRAMUSCULAR; INTRAVENOUS
Status: CANCELLED
Start: 2024-09-10

## 2024-06-06 RX ORDER — DEXTROSE, SODIUM CHLORIDE, AND POTASSIUM CHLORIDE 5; .45; .15 G/100ML; G/100ML; G/100ML
2000 INJECTION INTRAVENOUS ONCE
Status: CANCELLED
Start: 2024-08-26 | End: 2024-06-16

## 2024-06-06 RX ORDER — ALBUTEROL SULFATE 0.83 MG/ML
2.5 SOLUTION RESPIRATORY (INHALATION)
Status: CANCELLED | OUTPATIENT
Start: 2024-09-24

## 2024-06-06 RX ORDER — ALBUTEROL SULFATE 90 UG/1
1-2 AEROSOL, METERED RESPIRATORY (INHALATION)
Status: CANCELLED
Start: 2024-09-10

## 2024-06-06 RX ORDER — PALONOSETRON 0.05 MG/ML
0.25 INJECTION, SOLUTION INTRAVENOUS ONCE
Status: CANCELLED | OUTPATIENT
Start: 2024-09-17

## 2024-06-06 RX ORDER — METHYLPREDNISOLONE SODIUM SUCCINATE 125 MG/2ML
125 INJECTION, POWDER, LYOPHILIZED, FOR SOLUTION INTRAMUSCULAR; INTRAVENOUS
Status: CANCELLED
Start: 2024-09-03

## 2024-06-06 RX ORDER — HEPARIN SODIUM,PORCINE 10 UNIT/ML
5-20 VIAL (ML) INTRAVENOUS DAILY PRN
Status: CANCELLED | OUTPATIENT
Start: 2024-09-03

## 2024-06-06 RX ORDER — MEPERIDINE HYDROCHLORIDE 25 MG/ML
25 INJECTION INTRAMUSCULAR; INTRAVENOUS; SUBCUTANEOUS EVERY 30 MIN PRN
Status: CANCELLED | OUTPATIENT
Start: 2024-08-19

## 2024-06-06 RX ORDER — HEPARIN SODIUM (PORCINE) LOCK FLUSH IV SOLN 100 UNIT/ML 100 UNIT/ML
5 SOLUTION INTRAVENOUS
Status: CANCELLED | OUTPATIENT
Start: 2024-09-10

## 2024-06-06 RX ORDER — ALBUTEROL SULFATE 0.83 MG/ML
2.5 SOLUTION RESPIRATORY (INHALATION)
Status: CANCELLED | OUTPATIENT
Start: 2024-09-23

## 2024-06-06 RX ORDER — PALONOSETRON 0.05 MG/ML
0.25 INJECTION, SOLUTION INTRAVENOUS ONCE
Status: CANCELLED | OUTPATIENT
Start: 2024-09-10

## 2024-06-06 RX ORDER — ALBUTEROL SULFATE 90 UG/1
1-2 AEROSOL, METERED RESPIRATORY (INHALATION)
Status: CANCELLED
Start: 2024-08-19

## 2024-06-06 RX ORDER — MEPERIDINE HYDROCHLORIDE 25 MG/ML
25 INJECTION INTRAMUSCULAR; INTRAVENOUS; SUBCUTANEOUS EVERY 30 MIN PRN
Status: CANCELLED | OUTPATIENT
Start: 2024-09-24

## 2024-06-06 RX ORDER — METHYLPREDNISOLONE SODIUM SUCCINATE 125 MG/2ML
125 INJECTION, POWDER, LYOPHILIZED, FOR SOLUTION INTRAMUSCULAR; INTRAVENOUS
Status: CANCELLED
Start: 2024-09-24

## 2024-06-06 RX ORDER — EPINEPHRINE 1 MG/ML
0.3 INJECTION, SOLUTION INTRAMUSCULAR; SUBCUTANEOUS EVERY 5 MIN PRN
Status: CANCELLED | OUTPATIENT
Start: 2024-09-03

## 2024-06-06 RX ORDER — MEPERIDINE HYDROCHLORIDE 25 MG/ML
25 INJECTION INTRAMUSCULAR; INTRAVENOUS; SUBCUTANEOUS EVERY 30 MIN PRN
Status: CANCELLED | OUTPATIENT
Start: 2024-09-17

## 2024-06-06 RX ORDER — EPINEPHRINE 1 MG/ML
0.3 INJECTION, SOLUTION INTRAMUSCULAR; SUBCUTANEOUS EVERY 5 MIN PRN
Status: CANCELLED | OUTPATIENT
Start: 2024-08-19

## 2024-06-06 RX ORDER — ALBUTEROL SULFATE 90 UG/1
1-2 AEROSOL, METERED RESPIRATORY (INHALATION)
Status: CANCELLED
Start: 2024-09-24

## 2024-06-06 RX ORDER — DEXTROSE, SODIUM CHLORIDE, AND POTASSIUM CHLORIDE 5; .45; .15 G/100ML; G/100ML; G/100ML
2000 INJECTION INTRAVENOUS ONCE
Status: CANCELLED
Start: 2024-09-10 | End: 2024-06-23

## 2024-06-06 RX ORDER — ALBUTEROL SULFATE 90 UG/1
1-2 AEROSOL, METERED RESPIRATORY (INHALATION)
Status: CANCELLED
Start: 2024-09-17

## 2024-06-06 RX ORDER — MEPERIDINE HYDROCHLORIDE 25 MG/ML
25 INJECTION INTRAMUSCULAR; INTRAVENOUS; SUBCUTANEOUS EVERY 30 MIN PRN
Status: CANCELLED | OUTPATIENT
Start: 2024-09-23

## 2024-06-06 RX ORDER — HEPARIN SODIUM (PORCINE) LOCK FLUSH IV SOLN 100 UNIT/ML 100 UNIT/ML
5 SOLUTION INTRAVENOUS
Status: CANCELLED | OUTPATIENT
Start: 2024-09-17

## 2024-06-06 RX ORDER — DEXTROSE, SODIUM CHLORIDE, AND POTASSIUM CHLORIDE 5; .45; .15 G/100ML; G/100ML; G/100ML
2000 INJECTION INTRAVENOUS ONCE
Status: CANCELLED
Start: 2024-09-17 | End: 2024-06-30

## 2024-06-06 RX ORDER — METHYLPREDNISOLONE SODIUM SUCCINATE 125 MG/2ML
125 INJECTION, POWDER, LYOPHILIZED, FOR SOLUTION INTRAMUSCULAR; INTRAVENOUS
Status: CANCELLED
Start: 2024-09-17

## 2024-06-06 RX ORDER — HEPARIN SODIUM (PORCINE) LOCK FLUSH IV SOLN 100 UNIT/ML 100 UNIT/ML
5 SOLUTION INTRAVENOUS
Status: CANCELLED | OUTPATIENT
Start: 2024-09-23

## 2024-06-06 RX ORDER — PALONOSETRON 0.05 MG/ML
0.25 INJECTION, SOLUTION INTRAVENOUS ONCE
Status: CANCELLED | OUTPATIENT
Start: 2024-08-19

## 2024-06-06 ASSESSMENT — PAIN SCALES - GENERAL: PAINLEVEL: NO PAIN (0)

## 2024-06-06 NOTE — PROGRESS NOTES
"                        Consult Notes on Referred Patient      Dr. Carolina Hernandez, DO  [address] [facility] [address] [facility] [address]  [place] [address]       RE: Regina Gaspar  : 1960  WILFREDO: 2024     Dear Dr. Carolina Hernandez:    I had the pleasure of seeing your patient Regina Gaspar here at the Gynecologic Cancer [facility] at the [facility] on 2024.  As you know she is a very pleasant 63 year old woman with a recent diagnosis of vulvar cancer.  Given these findings she was subsequently sent to the Gynecologic Cancer Clinic for new patient consultation.     24 Was seen by Dr. Hernandez in follow-up from ED visit 3/2024, who reported the following:    \"63 year old non-pregnant female presents today complaining of a vulvar lesion.  Patient was seen in an emergency department on 3/31 with vaginal discharge and a fever.  Patient has noticed a vulvar lesion for approximately 2 months.  It will occasionally bleed.  She states it is bleeding now.  She denies any pain with this lesion.  No problems sitting.  She has no problems urinating.  Normal bowel movements.  No fevers or chills now.  Is not sexually active.  1 .  No back pain out of the norm.  She does have early satiety and notices that this has been going on for the last few weeks.  She is a longtime smoker.  She does not think she is up-to-date on her Pap smear but thinks it was done in the emergency department.  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  2.3 x 2.3 cm low-density lymph node or possible small fluid collection noted within the right groin. Could consider ultrasound and/or biopsy as clinically indicated.   2.  No other " evidence of malignancy within the chest, abdomen, and pelvis.    4/5/24 PAP NILM, HPV 16+    5/28/24 Radical local excision, bilateral inguinal and right pelvic LND    A. Vulva. 12 o'clock margin, biopsy:  - Squamous epithelium with reactive changes  - Negative for dysplasia or malignancy     B. Vulva, 3 o'clock margin, biopsy:   - Squamous epithelium with reactive changes  - Negative for dysplasia or malignancy     C. Vulva, 6 o'clock margin, biopsy:  - Squamous epithelium with reactive changes  - Negative for dysplasia or malignancy     D. Lymph node, right inguinal femoral, lymphadenectomy:  - METASTATIC SQUAMOUS CELL CARCINOMA in three of six lymph nodes (3/6)  - Largest metastatic deposit measures 3.6 cm  - No extra capsular extension identified     E. Lymph node, left inguinal femoral, lymphadenectomy:  - Two reactive lymph nodes examined, negative for malignancy (0/2)     F. Vulva, right, radical excision:  - INVASIVE KERATINIZING SQUAMOUS CELL CARCINOMA, moderately differentiated, HPV associated  - Size: 4.2 cm  - Depth of invasion 5 mm  - Surgical margins are negative for high grade dysplasia or carcinoma      G. Endocervix, curetting:  - Predominantly mucus with rare fragments of ectocervical and endocervical epithelium with reactive changes  - Negative for dysplasia or malignancy     H. Cervix, random, biopsies:  - Ectocervical and endocervical junctional tissue with reactive epithelial changes  - Negative for dysplasia or malignancy     I. Lymph node, right pelvic, lymphadenectomy:  - Four reactive lymph nodes examined, negative for malignancy (0/4)         Comments:   This is an appended report. These results have been appended to a previously preliminary verified report.      Electronically signed by Sylvester Grigsby MD on 6/6/2024 at 10:39 AM   Synoptic Checklist   VULVA   8th Edition - Protocol posted: 12/17/2021VULVA - F  SPECIMEN   Procedure  Radical excision of right vulvar lesion   TUMOR    Tumor Focality  Unifocal   Tumor Site  Right vulva   Tumor Size  Greatest Dimension (Centimeters): 4.2 cm   Histologic Type  Squamous cell carcinoma, HPV-associated   Histologic Grade  G2, moderately differentiated   Depth of Tumor Invasion  5 mm   Other Tissue / Organ Involvement  Not applicable   Lymphovascular Invasion  Not identified   MARGINS   Margin Status for Invasive Carcinoma  All margins negative for invasive carcinoma   Closest Margin(s) to Invasive Carcinoma  Peripheral: 12-3   Distance from Invasive Carcinoma to Closest Margin  Greater than: 10 mm   Margin Status for HSIL (VIN2-3) or dVIN  All margins negative for high-grade squamous intraepithelial lesion (HSIL) and / or differentiated vulvar intraepithelial neoplasia (dVIN)   REGIONAL LYMPH NODES   Regional Lymph Node Status  Tumor present in regional lymph node(s)   Number of Nodes with Metastasis 5 mm or Greater  1   Number of Nodes with Metastasis Less than 5 mm (excluding isolated tumor cells)  2   Laura Site(s) with Tumor  Right inguinofemoral   Additional Lymph Node Findings  None identified   Total Number of Lymph Nodes Examined  12   Laura Site(s) Examined  Right inguinal     Left inguinal     Right femoral     Left femoral     right pelvic   PATHOLOGIC STAGE CLASSIFICATION (pTNM, AJCC 8th ed.)   Reporting of pT, pN, and (when applicable) pM categories is based on information available to the pathologist at the time the report is issued. As per the AJCC (Chapter 1, 8th Ed.) it is the managing physician s responsibility to establish the final pathologic stage based upon all pertinent information, including but potentially not limited to this pathology report.   pT Category  pT1b   pN Category  pN2a   FIGO STAGE   FIGO Stage  IIIB               Review of Systems:    Systemic           no weight changes; no fever; no chills; no night sweats; no appetite changes  Skin           no rashes, or lesions  Eye           no irritation; no changes in  vision  Aly-Laryngeal           no dysphagia; no hoarseness   Pulmonary    no cough; no shortness of breath  Cardiovascular    no chest pain; no palpitations  Gastrointestinal    no diarrhea; no constipation; no abdominal pain; no changes in bowel  habits; no blood in stool  Genitourinary   no urinary frequency; no urinary urgency; no dysuria; no pain; no abnormal vaginal discharge; no abnormal vaginal bleeding  Breast   no breast discharge; no breast changes; no breast pain  Musculoskeletal    no myalgias; no arthralgias; no back pain  Psychiatric           no depressed mood; no anxiety    Hematologic           no tender lymph nodes; no noticeable swellings or lumps   Endocrine    no hot flashes; no heat/cold intolerance         Neurological   no tremor; no numbness and tingling; no headaches; no difficulty  sleeping      Past Medical History:    No past medical history on file. - NOt a regular attender of medical care        Past Surgical History:    Past Surgical History:   Procedure Laterality Date     SECTION      LAPAROSCOPIC LYMPHADENECTOMY N/A 2024    Procedure: Laparoscopic lymphadenectomy;  Surgeon: Javier Roche MD;  Location: [facility] OR    VULVECTOMY RADICAL DISSECT GROIN(S) N/A 2024    Procedure: Radical excision of right vulvar lesion. Removal of lymph nodes from left and right groin. Biopsies of cervix.;  Surgeon: Javier Roche MD;  Location:  OR           Health Maintenance:  Health Maintenance Due   Topic Date Due    NICOTINE/TOBACCO CESSATION COUNSELING Q 1 YR  Never done    DEXA  Never done    ADVANCE CARE PLANNING  Never done    MAMMO SCREENING  Never done    Pneumococcal Vaccine: 65+ Years (1 of 2 - PCV) Never done    COLORECTAL CANCER SCREENING  Never done    HIV SCREENING  Never done    HEPATITIS C SCREENING  Never done    ZOSTER IMMUNIZATION (1 of 2) Never done    LIPID  Never done    RSV VACCINE (Pregnancy & 60+) (1 - 1-dose 60+ series) Never done     "COVID-19 Vaccine (3 - Pfizer risk series) 11/22/2021    PHQ-2 (once per calendar year)  Never done    MEDICARE ANNUAL WELLNESS VISIT  Never done    FALL RISK ASSESSMENT  Never done    COLPOSCOPY  07/05/2024       Last Pap Smear: 2024 NILM HPV 16+; prior (remote)    Last Mammogram: none    Last Colonoscopy: Never                            Current Medications:     has a current medication list which includes the following prescription(s): trazodone.       Allergies:     Patient has no known allergies.        Social History:     Social History     Tobacco Use    Smoking status: Every Day     Current packs/day: 1.00     Types: Cigarettes    Smokeless tobacco: Never    Tobacco comments:     50 pack years   Substance Use Topics    Alcohol use: Yes     Comment: quart vodka/ week       History   Drug Use Unknown           Family History:     Family History   Problem Relation Age of Onset    Breast Cancer Mother         has had a couple times    Colon Cancer Father 80    Uterine Cancer No family hx of              Physical Exam:     PS 0  VS: /85 (BP Location: Right arm)   Pulse 98   Temp 98.2  F (36.8  C) (Oral)   Resp 16   Ht 1.727 m (5' 7.99\")   Wt 66.1 kg (145 lb 11.2 oz)   LMP  (LMP Unknown)   SpO2 97%   BMI 22.16 kg/m       General Appearance: healthy and alert, pleasant and appropriate     HEENT:  no thyromegaly, no palpable nodules or masses        Cardiovascular: regular rate and rhythm, no gallops, rubs or murmurs     Respiratory: lungs clear, no rales, rhonchi or wheezes, normal diaphragmatic excursion    Musculoskeletal: extremities non tender and without edema    Skin: no lesions or rashes     Neurological: normal gait, no gross defects     Psychiatric: appropriate mood and affect                               Hematological: normal cervical, supraclavicular and inguinal lymph nodes     Gastrointestinal:       abdomen soft, non-tender, non-distended, no organomegaly or " masses    Genitourinary: Wounds evaluated, well healing without erythema.  Staples removed, drains removed, steristrips placed.  Wound care instructions given.  Valera removed.  No wound separation.      Assessment:    Regina Gaspar is a 63 year old woman with a new diagnosis of vulvar cancer.     A total of 60 minutes was spent with the patient, 40 minutes of which were spent in counseling the patient and/or treatment planning.      Plan:     1.)   Vulvar cancer: We have discussed the clinical, and pathologic findings.  The margins are clear, but the groins was (3/6 nodes) positive and the obturator space was negative (0/4); contralateral nodes negative.  We have discussed that chemo potentiated RT is recommended, and that she may observe LE lymphedema during and/or after treatment (not in evidence today)     2.) Genetic risk factors were assessed and the patient does not meet the qualifications for a referral.      3.) Labs and/or tests ordered include:  RT consult     4.) Health maintenance issues addressed today include none.          Thank you for allowing us to participate in the care of your patient.         Sincerely,    Javier Roche MD    CC  Patient Care Team:  No Ref-Primary, Physician as PCP - General  Carolina Hernandez DO as Physician (OB/Gyn)  Javier Roche MD as MD (Gynecologic Oncology)  Carolina Hernandez DO as Assigned OBGYN Provider  Daquan Cramer, RN as Specialty Care Coordinator (Hematology & Oncology)  Javier Roche MD as Assigned Cancer Care Provider  CAROLINA HERNANDEZ

## 2024-06-06 NOTE — PATIENT INSTRUCTIONS
Referral to Radiation Oncology.  Plan for weekly chemotherapy with radiation treatments (Cisplatin).

## 2024-06-06 NOTE — PROGRESS NOTES
[facility]: Cancer Care Plan of Care Education Note                                    Discussion with Patient:                                                      RN met with patient and sister, Makayla, in clinic this afternoon, following patient's appointment with Dr. Roche.  Reviewed plan for chemoradiation (with weekly Cisplatin).  Referral order for Radiation Oncology has been placed.    Provided patient with chemotherapy folder/handouts, and education - along with handouts on cancer supportive services, possible side effects, management of side effects, when to call, and contact information for our clinic.       Assessment:                                                      Assessment completed with:: Patient;Family    Plan of Care Education   Yearly learning assessment completed?: Yes (see Education tab)  Diagnosis:: Vulvar cancer  Does patient understand diagnosis?: Yes  Tx plan/regimen:: Weekly Cisplatin (chemoradiation)  Does patient understand treatment plan/regimen?: Yes  Preparing for treatment:: Reviewed treatment preparation information with patient (vascular access, day of chemo, visitor policy, what to bring, etc.)  Vascular access education provided for:: Peripheral IV  Side effect education:: Diarrhea/Constipation;Fatigue;Hair loss;Infection;Lab value monitoring (anemia, neutropenia, thrombocytopenia);Mouth sores;Mylosuppression;Nausea/Vomiting;Neuropathy;Skin changes;Urinary  Supportive services education provided for:: Nutrition;Oncology behavioral health;Social work  Safety/self care at home reviewed with patient:: Yes  Coping - concerns/fears reviewed with patient:: Yes  Plan of Care:: Treatment schedule;MD follow-up appointment  When to call provider:: Bleeding;Increased shortness of breath;New/worsening pain;Shaking chills;Temperature >100.4F;Uncontrolled diarrhea/constipation;Uncontrolled nausea/vomiting  Reasons for deferring treatment reviewed with patient::  Yes    Evaluation of Learning  Patient Education Provided: Yes  Readiness:: Acceptance  Method:: Booklet/Handout;Explanation  Response:: Verbalizes understanding    Plan:                                                       Advised patient that she will be receiving a telephone call soon to schedule her consult appointment with Radiation Oncology - and that once her radiation therapy schedule is arranged, we will then be in contact to schedule her chemotherapy infusions.    Encouraged patient to reach out with any questions or concerns following today's visit.    Daquan Cramer, RN, BSN, OCN  RN Care Coordinator - Oncology  [facility]

## 2024-06-06 NOTE — LETTER
"2024      Regina Gaspar  [address]  [facility] [address]      Dear Colleague,    Thank you for referring your patient, Regina Gaspar, to the [facility]. Please see a copy of my visit note below.                            Consult Notes on Referred Patient      Dr. Carolina Hernandez, DO  [address]  [place] [address]       RE: Regina Gaspar  : 1960  WILFREDO: 2024     Dear Dr. Carolina Hernandez:    I had the pleasure of seeing your patient Regina Gaspar here at the Gynecologic Cancer Clinic at the [facility] on 2024.  As you know she is a very pleasant 63 year old woman with a recent diagnosis of vulvar cancer.  Given these findings she was subsequently sent to the Gynecologic Cancer Clinic for new patient consultation.     24 Was seen by Dr. Hernandez in follow-up from ED visit 3/2024, who reported the following:    \"63 year old non-pregnant female presents today complaining of a vulvar lesion.  Patient was seen in an emergency department on 3/31 with vaginal discharge and a fever.  Patient has noticed a vulvar lesion for approximately 2 months.  It will occasionally bleed.  She states it is bleeding now.  She denies any pain with this lesion.  No problems sitting.  She has no problems urinating.  Normal bowel movements.  No fevers or chills now.  Is not sexually active.  1 .  No back pain out of the norm.  She does have early satiety and notices that this has been going on for the last few weeks.  She is a longtime smoker.  She does not think she is up-to-date on her Pap smear but thinks it was done in the emergency department.  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  2.3 x 2.3 cm low-density lymph node or possible small fluid collection noted within the right groin. Could consider ultrasound and/or biopsy as clinically indicated.   2.  No other evidence of malignancy within the chest, abdomen, and pelvis.    4/5/24 PAP NILM, HPV 16+    5/28/24 Radical local excision, bilateral inguinal and right pelvic LND    A. Vulva. 12 o'clock margin, biopsy:  - Squamous epithelium with reactive changes  - Negative for dysplasia or malignancy     B. Vulva, 3 o'clock margin, biopsy:   - Squamous epithelium with reactive changes  - Negative for dysplasia or malignancy     C. Vulva, 6 o'clock margin, biopsy:  - Squamous epithelium with reactive changes  - Negative for dysplasia or malignancy     D. Lymph node, right inguinal femoral, lymphadenectomy:  - METASTATIC SQUAMOUS CELL CARCINOMA in three of six lymph nodes (3/6)  - Largest metastatic deposit measures 3.6 cm  - No extra capsular extension identified     E. Lymph node, left inguinal femoral, lymphadenectomy:  - Two reactive lymph nodes examined, negative for malignancy (0/2)     F. Vulva, right, radical excision:  - INVASIVE KERATINIZING SQUAMOUS CELL CARCINOMA, moderately differentiated, HPV associated  - Size: 4.2 cm  - Depth of invasion 5 mm  - Surgical margins are negative for high grade dysplasia or carcinoma      G. Endocervix, curetting:  - Predominantly mucus with rare fragments of ectocervical and endocervical epithelium with reactive changes  - Negative for dysplasia or malignancy     H. Cervix, random, biopsies:  - Ectocervical and endocervical junctional tissue with reactive epithelial changes  - Negative for dysplasia or malignancy     I. Lymph node, right pelvic, lymphadenectomy:  - Four reactive lymph nodes examined, negative for malignancy (0/4)         Comments:   This is an appended report. These results have been appended to a previously  preliminary verified report.      Electronically signed by Sylvester Grigsby MD on 6/6/2024 at 10:39 AM   Synoptic Checklist   VULVA   8th Edition - Protocol posted: 12/17/2021VULVA - F  SPECIMEN   Procedure  Radical excision of right vulvar lesion   TUMOR   Tumor Focality  Unifocal   Tumor Site  Right vulva   Tumor Size  Greatest Dimension (Centimeters): 4.2 cm   Histologic Type  Squamous cell carcinoma, HPV-associated   Histologic Grade  G2, moderately differentiated   Depth of Tumor Invasion  5 mm   Other Tissue / Organ Involvement  Not applicable   Lymphovascular Invasion  Not identified   MARGINS   Margin Status for Invasive Carcinoma  All margins negative for invasive carcinoma   Closest Margin(s) to Invasive Carcinoma  Peripheral: 12-3   Distance from Invasive Carcinoma to Closest Margin  Greater than: 10 mm   Margin Status for HSIL (VIN2-3) or dVIN  All margins negative for high-grade squamous intraepithelial lesion (HSIL) and / or differentiated vulvar intraepithelial neoplasia (dVIN)   REGIONAL LYMPH NODES   Regional Lymph Node Status  Tumor present in regional lymph node(s)   Number of Nodes with Metastasis 5 mm or Greater  1   Number of Nodes with Metastasis Less than 5 mm (excluding isolated tumor cells)  2   Laura Site(s) with Tumor  Right inguinofemoral   Additional Lymph Node Findings  None identified   Total Number of Lymph Nodes Examined  12   Laura Site(s) Examined  Right inguinal     Left inguinal     Right femoral     Left femoral     right pelvic   PATHOLOGIC STAGE CLASSIFICATION (pTNM, AJCC 8th ed.)   Reporting of pT, pN, and (when applicable) pM categories is based on information available to the pathologist at the time the report is issued. As per the AJCC (Chapter 1, 8th Ed.) it is the managing physician s responsibility to establish the final pathologic stage based upon all pertinent information, including but potentially not limited to this pathology report.   pT Category  pT1b   pN  Category  pN2a   FIGO STAGE   FIGO Stage  IIIB               Review of Systems:    Systemic           no weight changes; no fever; no chills; no night sweats; no appetite changes  Skin           no rashes, or lesions  Eye           no irritation; no changes in vision  Aly-Laryngeal           no dysphagia; no hoarseness   Pulmonary    no cough; no shortness of breath  Cardiovascular    no chest pain; no palpitations  Gastrointestinal    no diarrhea; no constipation; no abdominal pain; no changes in bowel  habits; no blood in stool  Genitourinary   no urinary frequency; no urinary urgency; no dysuria; no pain; no abnormal vaginal discharge; no abnormal vaginal bleeding  Breast   no breast discharge; no breast changes; no breast pain  Musculoskeletal    no myalgias; no arthralgias; no back pain  Psychiatric           no depressed mood; no anxiety    Hematologic           no tender lymph nodes; no noticeable swellings or lumps   Endocrine    no hot flashes; no heat/cold intolerance         Neurological   no tremor; no numbness and tingling; no headaches; no difficulty  sleeping      Past Medical History:    No past medical history on file. - NOt a regular attender of medical care        Past Surgical History:    Past Surgical History:   Procedure Laterality Date      SECTION       LAPAROSCOPIC LYMPHADENECTOMY N/A 2024    Procedure: Laparoscopic lymphadenectomy;  Surgeon: Javier Roche MD;  Location: [facility] OR     VULVECTOMY RADICAL DISSECT GROIN(S) N/A 2024    Procedure: Radical excision of right vulvar lesion. Removal of lymph nodes from left and right groin. Biopsies of cervix.;  Surgeon: Javier Roche MD;  Location:  OR           Health Maintenance:  Health Maintenance Due   Topic Date Due     NICOTINE/TOBACCO CESSATION COUNSELING Q 1 YR  Never done     DEXA  Never done     ADVANCE CARE PLANNING  Never done     MAMMO SCREENING  Never done     Pneumococcal Vaccine: 65+ Years (1  "of 2 - PCV) Never done     COLORECTAL CANCER SCREENING  Never done     HIV SCREENING  Never done     HEPATITIS C SCREENING  Never done     ZOSTER IMMUNIZATION (1 of 2) Never done     LIPID  Never done     RSV VACCINE (Pregnancy & 60+) (1 - 1-dose 60+ series) Never done     COVID-19 Vaccine (3 - Pfizer risk series) 11/22/2021     PHQ-2 (once per calendar year)  Never done     MEDICARE ANNUAL WELLNESS VISIT  Never done     FALL RISK ASSESSMENT  Never done     COLPOSCOPY  07/05/2024       Last Pap Smear: 2024 NILM HPV 16+; prior (remote)    Last Mammogram: none    Last Colonoscopy: Never                            Current Medications:     has a current medication list which includes the following prescription(s): trazodone.       Allergies:     Patient has no known allergies.        Social History:     Social History     Tobacco Use     Smoking status: Every Day     Current packs/day: 1.00     Types: Cigarettes     Smokeless tobacco: Never     Tobacco comments:     50 pack years   Substance Use Topics     Alcohol use: Yes     Comment: quart vodka/ week       History   Drug Use Unknown           Family History:     Family History   Problem Relation Age of Onset     Breast Cancer Mother         has had a couple times     Colon Cancer Father 80     Uterine Cancer No family hx of              Physical Exam:     PS 0  VS: /85 (BP Location: Right arm)   Pulse 98   Temp 98.2  F (36.8  C) (Oral)   Resp 16   Ht 1.727 m (5' 7.99\")   Wt 66.1 kg (145 lb 11.2 oz)   LMP  (LMP Unknown)   SpO2 97%   BMI 22.16 kg/m       General Appearance: healthy and alert, pleasant and appropriate     HEENT:  no thyromegaly, no palpable nodules or masses        Cardiovascular: regular rate and rhythm, no gallops, rubs or murmurs     Respiratory: lungs clear, no rales, rhonchi or wheezes, normal diaphragmatic excursion    Musculoskeletal: extremities non tender and without edema    Skin: no lesions or rashes     Neurological: normal " gait, no gross defects     Psychiatric: appropriate mood and affect                               Hematological: normal cervical, supraclavicular and inguinal lymph nodes     Gastrointestinal:       abdomen soft, non-tender, non-distended, no organomegaly or masses    Genitourinary: Wounds evaluated, well healing without erythema.  Staples removed, drains removed, steristrips placed.  Wound care instructions given.  Valera removed.  No wound separation.      Assessment:    Regina Gaspar is a 63 year old woman with a new diagnosis of vulvar cancer.     A total of 60 minutes was spent with the patient, 40 minutes of which were spent in counseling the patient and/or treatment planning.      Plan:     1.)   Vulvar cancer: We have discussed the clinical, and pathologic findings.  The margins are clear, but the groins was (3/6 nodes) positive and the obturator space was negative (0/4); contralateral nodes negative.  We have discussed that chemo potentiated RT is recommended, and that she may observe LE lymphedema during and/or after treatment (not in evidence today)     2.) Genetic risk factors were assessed and the patient does not meet the qualifications for a referral.      3.) Labs and/or tests ordered include:  RT consult     4.) Health maintenance issues addressed today include none.          Thank you for allowing us to participate in the care of your patient.         Sincerely,    Javier Roche MD    CC  Patient Care Team:  No Ref-Primary, Physician as PCP - General  Carolina Hernandez DO as Physician (OB/Gyn)  Javier Roche MD as MD (Gynecologic Oncology)  Carolina Hernandez DO as Assigned OBGYN Provider  Daquan Cramer RN as Specialty Care Coordinator (Hematology & Oncology)  Javier Roche MD as Assigned Cancer Care Provider  CAROLINA HERNANDEZ        Again, thank you for allowing me to participate in the care of your patient.        Sincerely,        Javier  Jitendra Roche MD

## 2024-06-06 NOTE — NURSING NOTE
"Oncology Rooming Note    June 6, 2024 2:22 PM   Regina Gaspar is a 64 year old female who presents for:    Chief Complaint   Patient presents with    Oncology Clinic Visit     Post-op DOS 5/28     Initial Vitals: /85 (BP Location: Right arm)   Pulse 98   Temp 98.2  F (36.8  C) (Oral)   Resp 16   Ht 1.727 m (5' 7.99\")   Wt 66.1 kg (145 lb 11.2 oz)   LMP  (LMP Unknown)   SpO2 97%   BMI 22.16 kg/m   Estimated body mass index is 22.16 kg/m  as calculated from the following:    Height as of this encounter: 1.727 m (5' 7.99\").    Weight as of this encounter: 66.1 kg (145 lb 11.2 oz). Body surface area is 1.78 meters squared.  No Pain (0) Comment: Data Unavailable   No LMP recorded (lmp unknown). Patient is postmenopausal.  Allergies reviewed: Yes  Medications reviewed: Yes    Medications: Medication refills not needed today.  Pharmacy name entered into [facility]: [facility] - [place], [place] - [facility] [address]    Frailty Screening:   Is the patient here for a new oncology consult visit in cancer care? 2. No        Eda Lubin LPN              "

## 2024-06-12 NOTE — TELEPHONE ENCOUNTER
5/28/24 Radical excition of right vulvar lesion. Removal of lymp notes from left and right groin. Biopsies of cervix  6/6/24 Gyn Onc visit. Plan chemo radiation

## 2024-06-18 ENCOUNTER — PATIENT OUTREACH (OUTPATIENT)
Dept: ONCOLOGY | Facility: CLINIC | Age: 64
End: 2024-06-18
Payer: MEDICARE

## 2024-06-18 NOTE — PROGRESS NOTES
[facility]: Cancer Care Note                                                          Received update from Danni with New Patient Navigation that patient has not yet returned phone calls to schedule her Radiation Oncology Consult.    Writer attempted reaching patient via telephone this morning, but ended up having to leave a detailed message.  Voicemail message was left, encouraging patient to call Radiation Oncology New Patient Scheduling at [phone] when she is able, to schedule her consult appointment.  Also provided direct phone number for this RN in case patient has any questions or concerns.    Will plan to try reaching out to patient and/or son later this week again, if no callback is received.    Daquan Cramer, RN, BSN, OCN  RN Care Coordinator - Oncology  [facility]

## 2024-06-21 NOTE — PROGRESS NOTES
[facility]: Cancer Care Note                                                          RN was able to connect with patient via telephone this afternoon, to discuss her Radiation Oncology referral.  Patient offered apology that she did not receive our phone calls - she stated she has been out of town for the last couple of weeks, and did not have her phone available.    Provided her with the direct phone number for Radiation Oncology new patient scheduling ([phone]), and encouraged her to call them to get scheduled soon.  Patient verbalized understanding, and stated agreement with plan.    Writer will follow along for scheduling updates and treatment plan.    Daquan Cramer, RN, BSN, OCN  RN Care Coordinator - Oncology  [facility]

## 2024-06-26 ENCOUNTER — PATIENT OUTREACH (OUTPATIENT)
Dept: RADIATION ONCOLOGY | Facility: CLINIC | Age: 64
End: 2024-06-26
Payer: MEDICARE

## 2024-06-26 NOTE — PROGRESS NOTES
Attempted to contact patient regarding radiation oncology consultation with [facility].  No answer, voice message left requesting return call to this RN at [phone].    Beronica Hein, RN BSN OCN CBCN

## 2024-06-27 NOTE — PROGRESS NOTES
Third attempt at contact with patient to review scheduled consultation and need to change appointment, no answer, detailed voice message left requesting return call directly to this RN at [phone].    Beronica Hein RN BSN OCN CBCN

## 2024-06-27 NOTE — PROGRESS NOTES
Second attempt at contact with patient to further review radiation oncology consultation scheduled, no answer, voice message left requesting return call to this RN at [phone].    Beronica Hein, RN BSN OCN CBCN

## 2024-06-28 NOTE — PROGRESS NOTES
Fourth attempt made at contacting patient to discuss radiation oncology consultation scheduled at [facility] and need to reschedule this appointment.  No answer, voice message left requesting return call to this RN at [phone] before 1500 today, 6/28/2024.  If patient calling after 1500 today, request that patient contact Robyn BRAGG at [phone] as this RN will be out of clinic.     Letter also mailed to patient's home today, 6/28/2024 notifying patient of attempts at contact and requesting patient to call clinic.    Robyn Mchugh RN updated via Epic in-basket.    Beronica Hein, RN BSN OCN CBCN

## 2024-07-08 NOTE — TELEPHONE ENCOUNTER
MEDICAL RECORDS REQUEST   Radiation Oncology  [address] [place] [address]  Fax: [phone]          FUTURE VISIT INFORMATION                                                   Regina Gaspar, : 1960 scheduled for future visit at [facility] Radiation Oncology    RECORDS REQUESTED FOR VISIT                                                     GYNECOLOGY STATUS DETAILS   OFFICE NOTE from OB/GYN [facility] 24: Dr. Carolina Hernandez   OFFICE NOTE from gyn onc [facility] 24: Dr. Javier Roche   OPERATIVE/BIOPSY REPORTS (include exam under anes) Epic 24: Radical excision of right vulvar lesion. Removal of lymph nodes from left and right groin. Biopsies of cervix   MEDICATION LIST [facility]    LABS     PATHOLOGY REPORTS Report in Epic 24: [id number]  24: [id number]   ANYTHING RELATED TO DIAGNOSIS Epic Most recent 24   IMAGING (NEED IMAGES & REPORT)     CT SCANS PACS 24: CT CAP   PET PACS 24: PET Onc Whole Body

## 2024-07-16 ENCOUNTER — OFFICE VISIT (OUTPATIENT)
Dept: RADIATION THERAPY | Facility: OUTPATIENT CENTER | Age: 64
End: 2024-07-16
Attending: OBSTETRICS & GYNECOLOGY
Payer: MEDICARE

## 2024-07-16 ENCOUNTER — PRE VISIT (OUTPATIENT)
Dept: RADIATION THERAPY | Facility: OUTPATIENT CENTER | Age: 64
End: 2024-07-16

## 2024-07-16 ENCOUNTER — OFFICE VISIT (OUTPATIENT)
Dept: FAMILY MEDICINE | Facility: CLINIC | Age: 64
End: 2024-07-16
Payer: MEDICARE

## 2024-07-16 VITALS
DIASTOLIC BLOOD PRESSURE: 80 MMHG | HEART RATE: 90 BPM | WEIGHT: 147 LBS | OXYGEN SATURATION: 94 % | TEMPERATURE: 97.7 F | HEIGHT: 68 IN | SYSTOLIC BLOOD PRESSURE: 122 MMHG | RESPIRATION RATE: 16 BRPM | BODY MASS INDEX: 22.28 KG/M2

## 2024-07-16 VITALS
HEART RATE: 82 BPM | DIASTOLIC BLOOD PRESSURE: 79 MMHG | OXYGEN SATURATION: 93 % | BODY MASS INDEX: 22.3 KG/M2 | RESPIRATION RATE: 16 BRPM | SYSTOLIC BLOOD PRESSURE: 126 MMHG | WEIGHT: 146.6 LBS

## 2024-07-16 DIAGNOSIS — F33.42 MAJOR DEPRESSIVE DISORDER, RECURRENT EPISODE, IN FULL REMISSION (H): ICD-10-CM

## 2024-07-16 DIAGNOSIS — Z09 HOSPITAL DISCHARGE FOLLOW-UP: Primary | ICD-10-CM

## 2024-07-16 DIAGNOSIS — G47.00 INSOMNIA, UNSPECIFIED TYPE: ICD-10-CM

## 2024-07-16 DIAGNOSIS — C51.9 VULVAR CANCER (H): ICD-10-CM

## 2024-07-16 PROCEDURE — 99495 TRANSJ CARE MGMT MOD F2F 14D: CPT | Performed by: PHYSICIAN ASSISTANT

## 2024-07-16 RX ORDER — RESPIRATORY SYNCYTIAL VIRUS VACCINE 120MCG/0.5
0.5 KIT INTRAMUSCULAR ONCE
Qty: 1 EACH | Refills: 0 | Status: CANCELLED | OUTPATIENT
Start: 2024-07-16 | End: 2024-07-16

## 2024-07-16 RX ORDER — TRAZODONE HYDROCHLORIDE 100 MG/1
100-200 TABLET ORAL AT BEDTIME
Qty: 180 TABLET | Refills: 1 | Status: SHIPPED | OUTPATIENT
Start: 2024-07-16

## 2024-07-16 RX ORDER — TRAZODONE HYDROCHLORIDE 50 MG/1
50-100 TABLET, FILM COATED ORAL AT BEDTIME
Qty: 60 TABLET | Refills: 1 | Status: CANCELLED | OUTPATIENT
Start: 2024-07-16

## 2024-07-16 ASSESSMENT — PAIN SCALES - GENERAL: PAINLEVEL: NO PAIN (0)

## 2024-07-16 ASSESSMENT — PATIENT HEALTH QUESTIONNAIRE - PHQ9
SUM OF ALL RESPONSES TO PHQ QUESTIONS 1-9: 15
10. IF YOU CHECKED OFF ANY PROBLEMS, HOW DIFFICULT HAVE THESE PROBLEMS MADE IT FOR YOU TO DO YOUR WORK, TAKE CARE OF THINGS AT HOME, OR GET ALONG WITH OTHER PEOPLE: NOT DIFFICULT AT ALL
SUM OF ALL RESPONSES TO PHQ QUESTIONS 1-9: 15

## 2024-07-16 NOTE — PROGRESS NOTES
Vanessa Tapia is a 64 year old, presenting for the following health issues:  Follow Up        7/16/2024     1:45 PM   Additional Questions   Roomed by Christal AUGUSTIN     [facility]     Hospital Follow-up Visit:    Hospital/Nursing Home/IP Rehab Facility: [facility]  Date of Admission: 5/28/2024  Date of Discharge: 5/30/2024  Reason(s) for Admission: Vulvar cancer  Was the patient in the ICU or did the patient experience delirium during hospitalization?  No  Do you have any other stressors you would like to discuss with your provider? No    Problems taking medications regularly:  None  Medication changes since discharge: None  Problems adhering to non-medication therapy:  None    Summary of hospitalization:  [facility] discharge summary reviewed  Diagnostic Tests/Treatments reviewed.  Follow up needed: none  Other Healthcare Providers Involved in Patient s Care:         Specialist appointment - oncology/radiation and GYN  Update since discharge: stable.     Plan of care communicated with patient         Patient presents for hospital follow-up/establish care. She was inpatient 5/28-5/30 for a radical excision of a right vulvar cancer, left and right pelvic lymph nodes and removal of right obturator lymph nodes. She reports surgery went really well. She really has not had much for pain. She has started weekly chemotherapy appointments which have been going well. Just met with radiation today and has mapping scheduled 7/22. Social work referral was placed today as patient does not drive. She reports her son and sister are helpful but they work and have their own lives too. She reports everything has healed well from the surgery but does have numbness. Bowel and bladder function normal. She denies any lower extremity edema. She reports moods have been doing OK. She reports more anxiety but manageable. She was on Zoloft and did not like how this made her feel.  "She reports she feels things are fine without medication. Does use Trazodone for sleep which helps. Son is getting  next month.       Review of Systems  Constitutional, HEENT, cardiovascular, pulmonary, GI, , musculoskeletal, neuro, skin, endocrine and psych systems are negative, except as otherwise noted.      Objective    /80   Pulse 90   Temp 97.7  F (36.5  C) (Temporal)   Resp 16   Ht 1.727 m (5' 7.99\")   Wt 66.7 kg (147 lb)   LMP  (LMP Unknown)   SpO2 94%   BMI 22.36 kg/m    Body mass index is 22.36 kg/m .  Physical Exam   GENERAL: alert and no distress  HENT: ear canals and TM's normal, nose and mouth without ulcers or lesions  NECK: no adenopathy, no asymmetry, masses, or scars  RESP: lungs clear to auscultation - no rales, rhonchi or wheezes  MS: no gross musculoskeletal defects noted, no edema  SKIN: no suspicious lesions or rashes  PSYCH: mentation appears normal, affect normal/bright        Assessment & Plan     Hospital discharge follow-up  Hospital course and health history reviewed today. She continue to follow with oncology and undergoing chemotherapy. She will be starting radiation treatments as well. She really has no other concerns at this time.     Vulvar cancer (H)  See above.     Insomnia, unspecified type  - traZODone (DESYREL) 100 MG tablet; Take 1-2 tablets (100-200 mg) by mouth at bedtime    Major depressive disorder, recurrent episode, in full remission (H24)  Doing OK - more situational and declines any intervention needed at this time.         MED REC REQUIRED  Post Medication Reconciliation Status: discharge medications reconciled and changed, per note/orders  Nicotine/Tobacco Cessation  She reports that she has been smoking cigarettes. She has never used smokeless tobacco.  Nicotine/Tobacco Cessation Plan  Information offered: Patient not interested at this time      Depression Screening Follow Up        7/16/2024     1:46 PM   PHQ   PHQ-9 Total Score 15   Q9: " Thoughts of better off dead/self-harm past 2 weeks Not at all       Follow Up Actions Taken  Crisis resource information provided in After Visit Summary       Signed Electronically by: Brooke Li PA-C    Answers submitted by the patient for this visit:  Patient Health Questionnaire (Submitted on 7/16/2024)  If you checked off any problems, how difficult have these problems made it for you to do your work, take care of things at home, or get along with other people?: Not difficult at all  PHQ9 TOTAL SCORE: 15

## 2024-07-16 NOTE — PROGRESS NOTES
Department of Radiation Oncology  Radiation Therapy Center  [facility]  [address], [address]  [place] [address]  [phone]       Consultation Note    Name: Regina Gaspar MRN: [id number]   : 1960   Date of Service: 2024  Referring: Dr. Roche     Reason for consultation: Squamous cell carcinoma of the vulva status post radical excision and lymph node dissection.  Final pathology demonstrated squamous cell carcinoma, 4.2 cm in size, HPV associated, depth of invasion 5 mm, no LVSI, negative margin, 3 out of 12 lymph node positive (including 3.6 cm right groin node without DRE), pathologic T1b N2a, IIIB.    History of Present Illness   Ms. Gaspar is a 64 year old female with a diagnosis of squamous cell  carcinoma of the vulva.    The patient initially presented to the emergency department in 2024 with vaginal discharge and fever.  She was noted to have a vulvar lesion, reportedly for approximately 2 months.  There was some associated bleeding at times.  On 2024 the patient underwent right vulvar biopsy.  Pathology demonstrated squamous cell carcinoma, well-differentiated.  PET scan obtained on 2024 demonstrated an FDG avid lesion in the vulva consistent with the biopsy-proven squamous cell cancer.  There is also noted a 2.8 cm right groin lymph node, necrotic.  Additionally a right obturator lymph node was noted measuring 1.2 cm in size.  No evidence of distant disease was noted.  On 2024 the patient went radical excision and lymph node dissection by Dr. Roche.  Final pathology demonstrated Squamous cell carcinoma of the vulva status post radical excision and lymph node dissection.  Final pathology demonstrated squamous cell carcinoma, 4.2 cm in size, HPV associated, depth of invasion 5 mm, no LVSI, negative margin, 3 out of 12 lymph node positive (including 3.6 cm right groin node without DRE), pathologic T1b N2a, IIIB.  The patient was  subsequently referred to our clinic to discuss the potential role of adjuvant radiation therapy.    Patient states she is overall doing fairly well.  She feels she has recovered from her surgery.  Denies any current wound healing issue.  Denies any current lymphedema.  No prior radiation.  No pacemaker.      Past Medical History:   Past Medical History:   Diagnosis Date    Vulvar cancer (H)        Past Surgical History:   Past Surgical History:   Procedure Laterality Date     SECTION      LAPAROSCOPIC LYMPHADENECTOMY N/A 2024    Procedure: Laparoscopic lymphadenectomy;  Surgeon: Javier Roche MD;  Location: [facility] OR    VULVECTOMY RADICAL DISSECT GROIN(S) N/A 2024    Procedure: Radical excision of right vulvar lesion. Removal of lymph nodes from left and right groin. Biopsies of cervix.;  Surgeon: Javier Roche MD;  Location: [facility] OR       Chemotherapy History:  None    Radiation History:  None    Pregnant: Not Applicable  Implanted Cardiac Devices: No    Medications:  Current Outpatient Medications   Medication Sig Dispense Refill    acetaminophen (TYLENOL) 325 MG tablet Take 2 tablets (650 mg) by mouth every 6 hours as needed for mild pain (Patient not taking: Reported on 2024) 40 tablet 0    ibuprofen (ADVIL/MOTRIN) 600 MG tablet Take 1 tablet (600 mg) by mouth every 6 hours as needed for inflammatory pain or mild pain (Patient not taking: Reported on 2024) 12 tablet 0    oxyCODONE (ROXICODONE) 5 MG tablet Take 1 tablet (5 mg) by mouth every 6 hours as needed for severe pain (Patient not taking: Reported on 2024) 12 tablet 0    senna-docusate (SENOKOT-S/PERICOLACE) 8.6-50 MG tablet Take 2 tablets by mouth 2 times daily as needed for constipation (Patient not taking: Reported on 2024) 60 tablet 0    sulfamethoxazole-trimethoprim (BACTRIM) 400-80 MG tablet Take 0.5 tablets by mouth daily (Patient not taking: Reported on 2024) 4 tablet 0    traZODone (DESYREL)  50 MG tablet Take 1-2 tablets ( mg) by mouth at bedtime 60 tablet 1     No current facility-administered medications for this visit.         Allergies:   No Known Allergies        Family History:  Family History   Problem Relation Age of Onset    Breast Cancer Mother         has had a couple times    Colon Cancer Father 80    Uterine Cancer No family hx of        Review of Systems   A 10-point review of systems was performed. Pertinent findings are noted in the HPI.    Physical Exam   ECOG Status: 1    Vitals:  /79 (BP Location: Left arm, Patient Position: Sitting, Cuff Size: Adult Regular)   Pulse 82   Resp 16   Wt 66.5 kg (146 lb 9.6 oz)   LMP  (LMP Unknown)   SpO2 93%   BMI 22.30 kg/m      Gen: Alert, in NAD  Head: NC/AT  Eyes: PERRL, EOMI, sclera anicteric  Ears: No external auricular lesions  Nose/sinus: No rhinorrhea or epistaxis  Oral cavity/oropharynx: MMM, no visible oral cavity lesions, FOM and BOT are soft to palpation  Neck: Full ROM, supple, no palpable adenopathy  Pulm: No wheezing, stridor or respiratory distress  CV: Extremities are warm and well-perfused, no cyanosis, no pedal edema  Abdominal: Normal bowel sounds, soft, nontender, no masses  Musculoskeletal: Normal bulk and tone  Skin: Normal color and turgor  Neuro: A/Ox3, CN II-XII intact, normal gait    Imaging/Path/Labs   Imagin24  PET              IMPRESSION:   In this patient with history of vulvar squamous cell carcinoma:  1. FDG avid lesion within the vulva consistent with biopsy-proven  squamous cell carcinoma.  2. 2.8 x 2.5 cm necrotic right inguinal lymph node representing a  metastatic node.  3. 1.2 x 0.6 cm mildly avid right obturator internus/pelvic sidewall  node is indeterminate.  4. No evidence of distant metastasis.    Path:   24  Vulva, right, biopsy-  Well differentiated squamous cell carcinoma with keratinization, superficially invasive, incompletely excised    24  A. Vulva. 12 o'clock  margin, biopsy:  - Squamous epithelium with reactive changes  - Negative for dysplasia or malignancy     B. Vulva, 3 o'clock margin, biopsy:   - Squamous epithelium with reactive changes  - Negative for dysplasia or malignancy     C. Vulva, 6 o'clock margin, biopsy:  - Squamous epithelium with reactive changes  - Negative for dysplasia or malignancy     D. Lymph node, right inguinal femoral, lymphadenectomy:  - METASTATIC SQUAMOUS CELL CARCINOMA in three of six lymph nodes (3/6)  - Largest metastatic deposit measures 3.6 cm  - No extra capsular extension identified     E. Lymph node, left inguinal femoral, lymphadenectomy:  - Two reactive lymph nodes examined, negative for malignancy (0/2)     F. Vulva, right, radical excision:  - INVASIVE KERATINIZING SQUAMOUS CELL CARCINOMA, moderately differentiated, HPV associated  - Size: 4.2 cm  - Depth of invasion 5 mm  - Surgical margins are negative for high grade dysplasia or carcinoma      G. Endocervix, curetting:  - Predominantly mucus with rare fragments of ectocervical and endocervical epithelium with reactive changes  - Negative for dysplasia or malignancy     H. Cervix, random, biopsies:  - Ectocervical and endocervical junctional tissue with reactive epithelial changes  - Negative for dysplasia or malignancy     I. Lymph node, right pelvic, lymphadenectomy:  - Four reactive lymph nodes examined, negative for malignancy (0/4)         Comments:   This is an appended report. These results have been appended to a previously preliminary verified report.      Electronically signed by Sylvester Grigsby MD on 6/6/2024 at 10:39 AM   Synoptic Checklist   VULVA   8th Edition - Protocol posted: 12/17/2021VULVA - F  SPECIMEN   Procedure  Radical excision of right vulvar lesion   TUMOR   Tumor Focality  Unifocal   Tumor Site  Right vulva   Tumor Size  Greatest Dimension (Centimeters): 4.2 cm   Histologic Type  Squamous cell carcinoma, HPV-associated   Histologic Grade  G2,  moderately differentiated   Depth of Tumor Invasion  5 mm   Other Tissue / Organ Involvement  Not applicable   Lymphovascular Invasion  Not identified   MARGINS   Margin Status for Invasive Carcinoma  All margins negative for invasive carcinoma   Closest Margin(s) to Invasive Carcinoma  Peripheral: 12-3   Distance from Invasive Carcinoma to Closest Margin  Greater than: 10 mm   Margin Status for HSIL (VIN2-3) or dVIN  All margins negative for high-grade squamous intraepithelial lesion (HSIL) and / or differentiated vulvar intraepithelial neoplasia (dVIN)   REGIONAL LYMPH NODES   Regional Lymph Node Status  Tumor present in regional lymph node(s)   Number of Nodes with Metastasis 5 mm or Greater  1   Number of Nodes with Metastasis Less than 5 mm (excluding isolated tumor cells)  2   Laura Site(s) with Tumor  Right inguinofemoral   Additional Lymph Node Findings  None identified   Total Number of Lymph Nodes Examined  12   Laura Site(s) Examined  Right inguinal     Left inguinal     Right femoral     Left femoral     right pelvic   PATHOLOGIC STAGE CLASSIFICATION (pTNM, AJCC 8th ed.)   Reporting of pT, pN, and (when applicable) pM categories is based on information available to the pathologist at the time the report is issued. As per the AJCC (Chapter 1, 8th Ed.) it is the managing physician s responsibility to establish the final pathologic stage based upon all pertinent information, including but potentially not limited to this pathology report.   pT Category  pT1b   pN Category  pN2a   FIGO STAGE   FIGO Stage  IIIB   Comment(s)  Best tumor block F18   .          Assessment    Ms. Gaspar is a 64 year old female with squamous cell carcinoma of the vulva status post radical excision and lymph node dissection.  Final pathology demonstrated squamous cell carcinoma, 4.2 cm in size, HPV associated, depth of invasion 5 mm, no LVSI, negative margin, 3 out of 12 lymph node positive (including 3.6 cm right groin node without  DRE), pathologic T1b N2a, IIIB.    Plan   I discussed the natural history of locally advanced squamous cell carcinoma of the vulva.    The patient is underwent radical colectomy and lymph node dissection.  Pathology demonstrated clean margin at the primary tumor site but multiple positive lymph nodes without extracapsular disease.    I recommend adjuvant radiation therapy with the goal of reducing local regional recurrence and potential improvement in survival (GOG 37, Cynthia et al, Obstet Gynecol, 2009).      I discussed side effects in great detail with the patient including but not limited to dermatitis, cystitis, bowel irritation, vaginal irritation/stenosis, and lymphedema.    5.  The patient wishes to proceed with treatment.  Consent was obtained.  Will plan to schedule the patient for CT simulation.    Everton Jimenez MD  Department of Radiation Oncology  [facility]

## 2024-07-16 NOTE — LETTER
2024      Regina Gaspar  [address]  [facility] [address]      Dear Colleague,    Thank you for referring your patient, Regina Gaspar, to the [facility] RADIATION THERAPY [facility]IN[facility]. Please see a copy of my visit note below.       Department of Radiation Oncology  Radiation Therapy Center  [facility]  [address], [address]  [place] [address]  [phone]       Consultation Note    Name: Regina Gaspar MRN: [id number]   : 1960   Date of Service: 2024  Referring: Dr. Roche     Reason for consultation: Squamous cell carcinoma of the vulva status post radical excision and lymph node dissection.  Final pathology demonstrated squamous cell carcinoma, 4.2 cm in size, HPV associated, depth of invasion 5 mm, no LVSI, negative margin, 3 out of 12 lymph node positive (including 3.6 cm right groin node without DRE), pathologic T1b N2a, IIIB.    History of Present Illness   Ms. Gaspar is a 64 year old female with a diagnosis of squamous cell  carcinoma of the vulva.    The patient initially presented to the emergency department in 2024 with vaginal discharge and fever.  She was noted to have a vulvar lesion, reportedly for approximately 2 months.  There was some associated bleeding at times.  On 2024 the patient underwent right vulvar biopsy.  Pathology demonstrated squamous cell carcinoma, well-differentiated.  PET scan obtained on 2024 demonstrated an FDG avid lesion in the vulva consistent with the biopsy-proven squamous cell cancer.  There is also noted a 2.8 cm right groin lymph node, necrotic.  Additionally a right obturator lymph node was noted measuring 1.2 cm in size.  No evidence of distant disease was noted.  On 2024 the patient went radical excision and lymph node dissection by Dr. Roche.  Final pathology demonstrated Squamous cell carcinoma of the vulva status post radical excision and lymph node dissection.  Final pathology  demonstrated squamous cell carcinoma, 4.2 cm in size, HPV associated, depth of invasion 5 mm, no LVSI, negative margin, 3 out of 12 lymph node positive (including 3.6 cm right groin node without DRE), pathologic T1b N2a, IIIB.  The patient was subsequently referred to our clinic to discuss the potential role of adjuvant radiation therapy.    Patient states she is overall doing fairly well.  She feels she has recovered from her surgery.  Denies any current wound healing issue.  Denies any current lymphedema.  No prior radiation.  No pacemaker.      Past Medical History:   Past Medical History:   Diagnosis Date     Vulvar cancer (H)        Past Surgical History:   Past Surgical History:   Procedure Laterality Date      SECTION       LAPAROSCOPIC LYMPHADENECTOMY N/A 2024    Procedure: Laparoscopic lymphadenectomy;  Surgeon: Javier Roche MD;  Location: [facility] OR     VULVECTOMY RADICAL DISSECT GROIN(S) N/A 2024    Procedure: Radical excision of right vulvar lesion. Removal of lymph nodes from left and right groin. Biopsies of cervix.;  Surgeon: Javier Roche MD;  Location: [facility] OR       Chemotherapy History:  None    Radiation History:  None    Pregnant: Not Applicable  Implanted Cardiac Devices: No    Medications:  Current Outpatient Medications   Medication Sig Dispense Refill     acetaminophen (TYLENOL) 325 MG tablet Take 2 tablets (650 mg) by mouth every 6 hours as needed for mild pain (Patient not taking: Reported on 2024) 40 tablet 0     ibuprofen (ADVIL/MOTRIN) 600 MG tablet Take 1 tablet (600 mg) by mouth every 6 hours as needed for inflammatory pain or mild pain (Patient not taking: Reported on 2024) 12 tablet 0     oxyCODONE (ROXICODONE) 5 MG tablet Take 1 tablet (5 mg) by mouth every 6 hours as needed for severe pain (Patient not taking: Reported on 2024) 12 tablet 0     senna-docusate (SENOKOT-S/PERICOLACE) 8.6-50 MG tablet Take 2 tablets by mouth 2 times daily  as needed for constipation (Patient not taking: Reported on 2024) 60 tablet 0     sulfamethoxazole-trimethoprim (BACTRIM) 400-80 MG tablet Take 0.5 tablets by mouth daily (Patient not taking: Reported on 2024) 4 tablet 0     traZODone (DESYREL) 50 MG tablet Take 1-2 tablets ( mg) by mouth at bedtime 60 tablet 1     No current facility-administered medications for this visit.         Allergies:   No Known Allergies        Family History:  Family History   Problem Relation Age of Onset     Breast Cancer Mother         has had a couple times     Colon Cancer Father 80     Uterine Cancer No family hx of        Review of Systems   A 10-point review of systems was performed. Pertinent findings are noted in the HPI.    Physical Exam   ECOG Status: 1    Vitals:  /79 (BP Location: Left arm, Patient Position: Sitting, Cuff Size: Adult Regular)   Pulse 82   Resp 16   Wt 66.5 kg (146 lb 9.6 oz)   LMP  (LMP Unknown)   SpO2 93%   BMI 22.30 kg/m      Gen: Alert, in NAD  Head: NC/AT  Eyes: PERRL, EOMI, sclera anicteric  Ears: No external auricular lesions  Nose/sinus: No rhinorrhea or epistaxis  Oral cavity/oropharynx: MMM, no visible oral cavity lesions, FOM and BOT are soft to palpation  Neck: Full ROM, supple, no palpable adenopathy  Pulm: No wheezing, stridor or respiratory distress  CV: Extremities are warm and well-perfused, no cyanosis, no pedal edema  Abdominal: Normal bowel sounds, soft, nontender, no masses  Musculoskeletal: Normal bulk and tone  Skin: Normal color and turgor  Neuro: A/Ox3, CN II-XII intact, normal gait    Imaging/Path/Labs   Imagin24  PET              IMPRESSION:   In this patient with history of vulvar squamous cell carcinoma:  1. FDG avid lesion within the vulva consistent with biopsy-proven  squamous cell carcinoma.  2. 2.8 x 2.5 cm necrotic right inguinal lymph node representing a  metastatic node.  3. 1.2 x 0.6 cm mildly avid right obturator internus/pelvic  sidewall  node is indeterminate.  4. No evidence of distant metastasis.    Path:   4/5/24  Vulva, right, biopsy-  Well differentiated squamous cell carcinoma with keratinization, superficially invasive, incompletely excised    5/28/24  A. Vulva. 12 o'clock margin, biopsy:  - Squamous epithelium with reactive changes  - Negative for dysplasia or malignancy     B. Vulva, 3 o'clock margin, biopsy:   - Squamous epithelium with reactive changes  - Negative for dysplasia or malignancy     C. Vulva, 6 o'clock margin, biopsy:  - Squamous epithelium with reactive changes  - Negative for dysplasia or malignancy     D. Lymph node, right inguinal femoral, lymphadenectomy:  - METASTATIC SQUAMOUS CELL CARCINOMA in three of six lymph nodes (3/6)  - Largest metastatic deposit measures 3.6 cm  - No extra capsular extension identified     E. Lymph node, left inguinal femoral, lymphadenectomy:  - Two reactive lymph nodes examined, negative for malignancy (0/2)     F. Vulva, right, radical excision:  - INVASIVE KERATINIZING SQUAMOUS CELL CARCINOMA, moderately differentiated, HPV associated  - Size: 4.2 cm  - Depth of invasion 5 mm  - Surgical margins are negative for high grade dysplasia or carcinoma      G. Endocervix, curetting:  - Predominantly mucus with rare fragments of ectocervical and endocervical epithelium with reactive changes  - Negative for dysplasia or malignancy     H. Cervix, random, biopsies:  - Ectocervical and endocervical junctional tissue with reactive epithelial changes  - Negative for dysplasia or malignancy     I. Lymph node, right pelvic, lymphadenectomy:  - Four reactive lymph nodes examined, negative for malignancy (0/4)         Comments:   This is an appended report. These results have been appended to a previously preliminary verified report.      Electronically signed by Sylvester Grigsby MD on 6/6/2024 at 10:39 AM   Synoptic Checklist   VULVA   8th Edition - Protocol posted: 12/17/2021VULVA -  F  SPECIMEN   Procedure  Radical excision of right vulvar lesion   TUMOR   Tumor Focality  Unifocal   Tumor Site  Right vulva   Tumor Size  Greatest Dimension (Centimeters): 4.2 cm   Histologic Type  Squamous cell carcinoma, HPV-associated   Histologic Grade  G2, moderately differentiated   Depth of Tumor Invasion  5 mm   Other Tissue / Organ Involvement  Not applicable   Lymphovascular Invasion  Not identified   MARGINS   Margin Status for Invasive Carcinoma  All margins negative for invasive carcinoma   Closest Margin(s) to Invasive Carcinoma  Peripheral: 12-3   Distance from Invasive Carcinoma to Closest Margin  Greater than: 10 mm   Margin Status for HSIL (VIN2-3) or dVIN  All margins negative for high-grade squamous intraepithelial lesion (HSIL) and / or differentiated vulvar intraepithelial neoplasia (dVIN)   REGIONAL LYMPH NODES   Regional Lymph Node Status  Tumor present in regional lymph node(s)   Number of Nodes with Metastasis 5 mm or Greater  1   Number of Nodes with Metastasis Less than 5 mm (excluding isolated tumor cells)  2   Laura Site(s) with Tumor  Right inguinofemoral   Additional Lymph Node Findings  None identified   Total Number of Lymph Nodes Examined  12   Laura Site(s) Examined  Right inguinal     Left inguinal     Right femoral     Left femoral     right pelvic   PATHOLOGIC STAGE CLASSIFICATION (pTNM, AJCC 8th ed.)   Reporting of pT, pN, and (when applicable) pM categories is based on information available to the pathologist at the time the report is issued. As per the AJCC (Chapter 1, 8th Ed.) it is the managing physician s responsibility to establish the final pathologic stage based upon all pertinent information, including but potentially not limited to this pathology report.   pT Category  pT1b   pN Category  pN2a   FIGO STAGE   FIGO Stage  IIIB   Comment(s)  Best tumor block F18   .          Assessment    Ms. Gaspar is a 64 year old female with squamous cell carcinoma of the vulva  status post radical excision and lymph node dissection.  Final pathology demonstrated squamous cell carcinoma, 4.2 cm in size, HPV associated, depth of invasion 5 mm, no LVSI, negative margin, 3 out of 12 lymph node positive (including 3.6 cm right groin node without DRE), pathologic T1b N2a, IIIB.    Plan   I discussed the natural history of locally advanced squamous cell carcinoma of the vulva.    The patient is underwent radical colectomy and lymph node dissection.  Pathology demonstrated clean margin at the primary tumor site but multiple positive lymph nodes without extracapsular disease.    I recommend adjuvant radiation therapy with the goal of reducing local regional recurrence and potential improvement in survival (GOG 37, Cynthia et al, Obstet Gynecol, 2009).      I discussed side effects in great detail with the patient including but not limited to dermatitis, cystitis, bowel irritation, vaginal irritation/stenosis, and lymphedema.    5.  The patient wishes to proceed with treatment.  Consent was obtained.  Will plan to schedule the patient for CT simulation.    Everton Jimenez MD  Department of Radiation Oncology  [facility]       Again, thank you for allowing me to participate in the care of your patient.        Sincerely,        Everton Jimenez MD

## 2024-07-16 NOTE — NURSING NOTE
"REASON FOR APPOINTMENT   Type of Cancer: vulvar cancer, R  Location: R side and R nodes  Date of Symptom Onset: noticed a lesion x 2 months - went to PCP 3/2024    TREATMENT TO-DATE FOR THIS CANCER  Surgery ? Dr. Roche 5/28/24   Chemotherapy ? +/- along with radiation, Dr. Roche   Other Treatments for this Cancer ? Discussion and education about radiation    PERSONAL HISTORY OF CANCER   Previous Cancer ? no   Prior Radiation ? no   Prior Chemotherapy ? no   Prior Hormonal Therapy ? no     RECENT IMAGING STUDIES      REFERRALS NEEDED  Patient would like to talk with SW -transportation, she does not drive    VITALS  /79 (BP Location: Left arm, Patient Position: Sitting, Cuff Size: Adult Regular)   Pulse 82   Resp 16   Wt 66.5 kg (146 lb 9.6 oz)   LMP  (LMP Unknown)   SpO2 93%   BMI 22.30 kg/m      PACEMAKER/IMPLANTED CARDIAC DEVICE no    PAIN  Denies  \"it's numb at the surgery site\"    PSYCHOSOCIAL  Marital Status: single  Patient lives in [place] with self.  Number of children: 1 adult son who is getting  on 8/17/24  Working status: retired  Do you feel safe in your home? Yes    REVIEW OF SYSTEMS  Skin: negative  Eyes: glasses  Ears/Nose/Throat: negative  Respiratory: No shortness of breath, dyspnea on exertion, cough, or hemoptysis  Cardiovascular: negative  Gastrointestinal: negative  Genitourinary: negative  Musculoskeletal: negative  Neurologic: negative  Psychiatric: negative  Hematologic/Lymphatic/Immunologic: poor appetite  Endocrine: negative    WOMEN ONLY  Any chance you may be pregnant: No    Radiation Oncology Patient Teaching    Current Concern: patient is nervous about treatment and side effects. She is ready to learn.   She is preparing for her sons wedding 8/17/24!    Person involved with teaching: Patient  Patient asked Questions: Yes  Patient was cooperative: Yes  Patient was receptive (willing to accept information given): Yes    Education Assessment  Comprehension ability: " Medium  Knowledge level: Medium  Factors affecting teaching: new cancer treatment information, first consult, first time learning about radiation    Education Materials Given  Radiation Therapy and You  Caring for Your Skin During Radiation ...  Radiation Therapy for GYN Patients    Educational Topics Discussed  Side effects, Medications, Activity, Nutrition, Adjustment to illness, and When to call MD/RN    Response To Teaching  More review necessary    GYN Only  Vaginal Dilator-given and educated: not given    Do you have an advanced directive or living will? Yes  Are you DNR/DNI? No

## 2024-07-18 ENCOUNTER — PATIENT OUTREACH (OUTPATIENT)
Dept: CARE COORDINATION | Facility: CLINIC | Age: 64
End: 2024-07-18
Payer: MEDICARE

## 2024-07-18 NOTE — PROGRESS NOTES
Social Work - Intervention  [facility]  Data/Intervention:    Patient Name: Regina Gaspar Goes By: Regina    /Age: 1960 (64 year old)     Visit Type: telephone  Referral Source: DELIA Samuel  Reason for Referral: Transportation    Collaborated With:    -pt  -Madhuri, RN  -Dr. Jimenez     Psychosocial Information/Concerns:  Regina is a 64 year old with a dx of vulvar cancer. She was seen at the [place] rad onc clinic for consult. She expressed concerns about rides to radiation. SW reached out to Regina to assess her needs. Regina lives in [facility]. She doesn't drive and doesn't have anyone who is able to help on a daily basis for radiation. She also doesn't have the means to pay for rides. Explored different options for the location of her treatment. She is open to treatment at any [facility] location. Provided information re [facility] and she felt staying there during radiation would be the easiest for her. She confirmed that treatment at [facility] would be her preference since it is closest to [facility]. SW discussed with SW team and Rad Onc team. RNs and schedulers to discuss changes in location of her treatment. Pt felt positive at end of conversation and relieved that she won't face barriers to her care. She will connect with family and friends re rides for consult and sim for radiation at [facility].     Intervention/Education/Resources Provided:  Assessed transportation needs  Explored support system and resources  Educated re [facility]   Discussed cancer grants available   Problem solved how to minimize barriers to tx/care     Assessment/Plan:  Provided patient with contact information and availability.  GIOVANNA Dalal, [facility]  Adult Oncology Clinics  [place] (M,W), [place] (T) & [place] ()  *I am off Friday  Office: [phone]

## 2024-07-18 NOTE — PROGRESS NOTES
Social Work - Telephone/MyChart message  [facility]  Data:   Patient Name: Regina Gaspar  Goes By: Regina SINGHB/Age: 1960 (64 year old)      Referral Source: RACHEL Samuel    Reason for Referral: Transportation    Intervention: SW called pt to introduce self and assess transportation needs. Left contact information and invited a call back if assistance is still needed.    Plan:  will await patient's return phone call/message and provide assistance at that time.   GIOVANNA Dalal, [facility]  Adult Oncology Clinics  [place] (M,W), [place] (T) & [place] (Th)  *I am off Friday  Office: [phone]

## 2024-07-24 ENCOUNTER — PATIENT OUTREACH (OUTPATIENT)
Dept: ONCOLOGY | Facility: CLINIC | Age: 64
End: 2024-07-24
Payer: MEDICARE

## 2024-07-24 NOTE — PROGRESS NOTES
[facility]: Cancer Care Note                                                          Writer received the following message from Melita, Radiation Oncology RN, regarding patient's treatment plan:    We have pt scheduled for consult and sim 8/8/24. Plan is for her to stay at [facility] while under txm.     Probably start week of 8/19/24.    Melita      Attempted reaching patient via telephone this afternoon, to confirm which site she is wanting to receive her chemotherapy treatment ([facility] in [place] versus another site).  Patient did not answer telephone call at this time.  Voicemail message was left, requesting callback.    Once confirmation is received from patient, will work with scheduling team to arrange patient's infusion appointments.      Daquan Cramer, RN, BSN, OCN  RN Care Coordinator - Oncology  [facility]

## 2024-07-25 NOTE — PROGRESS NOTES
[facility]: Cancer Care Note                                                          Received message from MAHSA HuaCC with Radiation Oncology, regarding patient's treatment plan and planned treatment location:    Hello Daquan,     Yes, because of ride issues pt wants all her txms here at the  so she can stay at [facility] and take the shuttle. This will be much less stressful for the pt and family from what I have been told.     So chemo should be at the [facility].     Thanks,   Melita      Message was sent to our scheduling team this afternoon, requesting assistance in scheduling patient's weekly chemotherapy appointments starting the week of 8/19/24 (at the [facility] in [place]).       Daquan Cramer, RN, BSN, OCN  RN Care Coordinator - Oncology  [facility]

## 2024-07-26 DIAGNOSIS — C51.9 VULVAR CANCER (H): Primary | ICD-10-CM

## 2024-08-05 NOTE — PROGRESS NOTES
ATTENDING NOTE:    DIAGNOSIS: Squamous cell carcinoma of the vulva, s/p radical vulvectomy and LND, [id number], FIGO IIIB.       HISTORY OF PRESENT ILLNESS: ms. Regina Gaspar is a 63 yo female with a newly diagnosed vulva cancer, s/p     She initially presented with ED on 3/31/2024 with 1 month h/o foul-smelling dark yellow vaginal discharge and a lump on her right labia. Exam showed a 3 cm friable lesion. CT of the chest/abdomen/pelvis showed a 2.3 x 2.3 cm low density lymph node I the right groin, but otherwise unremarkable. She then saw Dr. Hernandez in Ob/Gyn on 4/5/2024. Bx of the vulva mass was consistent with well differentiated squamous cell carcinoma, HPV 16 positive. Cervical pap was negative for intraepithelial lesion.     Regina established care with Dr. Roche. PET/CT on 4/29/2024 revealed the vulva lesion to measure 3.2 x 3.6 x 0.8 cm, with a max SUV of 10.37. Additionally, there was a 2.8 x 2.5 cm necrotic right inguinal node with max SUV of 3.58 and a 6 x 12 mm right pelvic side wall node with max SUV of 3.5. A prominent portacaval node measuring 7 mm x 19 mm with max SUV of 2.93 was felt to be reactive.     She proceeded with radical right vulvectomy and lymph node sampling on 5/28/2024. Intra-op findings included grossly abnormal right vulva with a 4 x 4 cm mass involving the mid portion of the labia majora and minora. Enlarged right groin lymph node, enlarged obturator space node. The right obturator node was removed via laparoscopic procedure. Final pathology revealed   1) Vulva: invasive keratinizing squamous cell carcinoma,moderately differentiated, HPV associated, 4.2 cm in greatest dimension, depth of invasion 5 mm. LVSI was not identified. All margins were greater than 10 mm for invasive, H/CHERYLE or dVIN.   2) L inguinal LND: 0/2   3) R inguinal LND: 3/6, largest measuring 3.6 cm with no DRE  4) R pelvic LND: 0/4   Stage: [id number], FIGO IIIB    Regina was referred to Dr. Everton Jimenez at [facility]  for adjuvant radiation therapy and saw him on 7/16/2024. She was initially scheduled to proceed with a simulation, but ultimately decided tthat being treated at the  while staying at [facility] is easier than driving to [place] daily. She is thus seen today.     On interview, Regina states that she is healing very well from the surgery. She denies pain at the surgical site. She has no vaginal discharge or bleeding. Her bowel movements and bladder function are at baseline.     She is accompanied by her son Tiago, who is getting  on 8/17. Her chemotherapy is scheduled on 8/19/2024.         OTHER RELEVANT HISTORIES:   Single, lives in an apartment in [place], [place]  Son Tiago Gaspar  Sister lives in [place]      EXAM:   /76   Pulse 100   Wt 66.7 kg (147 lb)   LMP  (LMP Unknown)   SpO2 92%   BMI 22.36 kg/m     No acute distress. Good sense of humor. Good spirit.  Pelvic: well healed bilateral groin scars. Evidence of vulvectomy of the right side. The scar is very difficult to visualize, but appears to extend from the vaginal introitus to the clitoris. No visible mass or ulceration.       KEY IMAGING:            ASSESSMENT/PLAN: In summary, Ms. Gaspar is a 63 yo female with a newly diagnosed squamous cell carcinoma of the vulva, s/p right radical vulvectomy and LND. Pathology is significant for finding of 3 positive lymph nodes in the right groin, with the largest measuring 3.6 cm. Of note the initial mildly PET positive right external iliac node was surgically removed, with pathology showing no evidence of malignancy. Her primary tumor was resected to a widely negative margin of > 10 mm. The only risk factor is depth of invasion of 5 mm.     Given the positive groin nodes, we recommend adjuvant radiation therapy with concurrent Cisplatin. We plan to treat her right pelvic violetta chain, left groin to 45 Gy in 25 fractions with a simultaneous integrated boost of 50 Gy in 25 fractions to the vulva and  right groin. Following this, the right groin high risk post-op bed (region of positive lymph nodes) will be sequentially boosted with an additional 10 Gy in 5 fractions. The total dose to the right groin will be 60 Gy in 30 fractions.     We described the logistics and the side effects of the radiation in great details. She would like to proceed and signed the consent form. She will be simulated today. Her start date will be 8/19/2024, concurrent with Cisplatin.           Fior Rai MD/PhD  Radiation Oncology  [phone] (pager)           I reviewed patient's chart, internal/external medical records, imaging studies (including actual images), labs and pathology reports.  I interviewed and counseled the patient face to face.  I additionally discussed the case with patient's referring physicians and care team.      90 minutes were spent on the date of the encounter doing chart review, history and exam, documentation and further activities as noted above.           Fior Rai MD

## 2024-08-06 NOTE — PROGRESS NOTES
Department of Radiation Oncology  [address] [address]  [phone]       Consultation Note    Name: Regina Gaspar MRN: [id number]   : 1960   Date of Service: 2024  Referring: Dr. Roche     Diagnosis: Squamous cell carcinoma of the vulva  Stage: pT1b,pN2a,M0. FIGO stage IIIB    History of Present Illness   Ms. Gaspar is a 64 year old female with FIGO stage IIIb squamous cell carcinoma of the vulva s/p radical local excision with bilateral inguinal and right pelvic LND who presents to the clinic for initial consultation.    On 3/31/2024 Mr. Gaspar presented to the emergency department with complaint of vaginal discharge and fevers.  She reported foul-smelling dark yellow vaginal discharge for 1 month with associated vaginal pain, itching, and burning as well as a lump on her right labia.   exam at that time revealed a 3 cm hard friable ulcerated lesion on the right labia minora.  Speculum exam showed normal cervix and no tenderness or masses palpated on bimanual exam.  CT scan of the chest abdomen and pelvis showed a 2.3 x 2.3 cm lymph node within the right groin, no other evidence of metastatic disease was present.    On 2024, she underwent a vulvar biopsy.  Subsequent pathology revealed well-differentiated squamous cell carcinoma, superficially invasive.  Pap smear at this time is negative.    She had initial consultation with Dr. Roche on 2024.   exam at this time showed a 4 x 4 centimeter exophytic lesion on the right mid vulva with an area of normal tissue between mass and urethra.  Vaginal surface and cervix were visually normal.  1 palpable right sided medial inguinal lymph node was enlarged and minimally mobile/nontender.  Bimanual exam was unremarkable.    PET/CT was performed on 2024 and revealed an FDG avid lesion within the right vulva as well as a 2.8 x 2.5 cm necrotic right inguinal lymph node, as well as  an indeterminate 1.2 x 0.6 cm mildly avid right obturator internus/pelvic sidewall node.  There was no evidence of distant metastases.    She underwent radical excision of the right vulvar lesion with bilateral inguinal and right pelvic LND with Dr. Roche on 2024.  Subsequent pathology of the vulva revealed invasive keratinizing squamous cell carcinoma, moderately differentiated, HPV associated measuring 4.2 cm with a depth of invasion 5 mm.  Surgical margins were negative.  3/6 right inguinal femoral lymph nodes were positive for metastatic SCC with the largest deposit being 3.6 cm and no extracapsular extension identified.  4 right pelvic, and 2 left inguinal femoral lymph nodes were all negative for malignancy (3/12 total postive).  Final staging pT1b,pN21,M0 FIGO stage IIIB. No LVSI or DVIN.     She is accompanied by her son Tiago today.  Overall she reports healing well from surgery she denies any vaginal bleeding or discharge at this time.  Her postoperative pain has also been well-managed.  She previously met with Dr. Jimenez to discuss radiation therapy.        CHEMOTHERAPY HISTORY: None  PACEMAKER: No   PREGNANCY STATUS: Not Pregnant   PAST RADIATION THERAPY HISTORY: None    PAST MEDICAL HISTORY:  Past Medical History:   Diagnosis Date    Vulvar cancer (H) 2024       PAST SURGICAL HISTORY:  Past Surgical History:   Procedure Laterality Date     SECTION      LAPAROSCOPIC LYMPHADENECTOMY N/A 2024    Procedure: Laparoscopic lymphadenectomy;  Surgeon: aJvier Roche MD;  Location: [facility] OR    VULVECTOMY RADICAL DISSECT GROIN(S) N/A 2024    Procedure: Radical excision of right vulvar lesion. Removal of lymph nodes from left and right groin. Biopsies of cervix.;  Surgeon: Javier Roche MD;  Location: [facility] OR       ALLERGIES:  Allergies as of 2024    (No Known Allergies)       MEDICATIONS:  Current Outpatient Medications   Medication Sig Dispense Refill    traZODone  (DESYREL) 100 MG tablet Take 1-2 tablets (100-200 mg) by mouth at bedtime 180 tablet 1        FAMILY HISTORY:  Family History   Problem Relation Age of Onset    Breast Cancer Mother         has had a couple times    Colon Cancer Father 80    Uterine Cancer No family hx of        SOCIAL HISTORY:  Social History     Socioeconomic History    Marital status: Single     Spouse name: Not on file    Number of children: 1    Years of education: Not on file    Highest education level: Not on file   Occupational History    Not on file   Tobacco Use    Smoking status: Every Day     Types: Cigarettes    Smokeless tobacco: Never    Tobacco comments:     50 pack years. 8/8/24, has not had a cigarette in a couple weeks   Vaping Use    Vaping status: Never Used   Substance and Sexual Activity    Alcohol use: Yes     Comment: quart vodka/ week    Drug use: Never    Sexual activity: Not Currently   Other Topics Concern    Not on file   Social History Narrative    Not on file     Social Determinants of Health     Financial Resource Strain: Low Risk  (7/16/2024)    Financial Resource Strain     Within the past 12 months, have you or your family members you live with been unable to get utilities (heat, electricity) when it was really needed?: No   Food Insecurity: Low Risk  (7/16/2024)    Food Insecurity     Within the past 12 months, did you worry that your food would run out before you got money to buy more?: No     Within the past 12 months, did the food you bought just not last and you didn t have money to get more?: No   Transportation Needs: High Risk (7/16/2024)    Transportation Needs     Within the past 12 months, has lack of transportation kept you from medical appointments, getting your medicines, non-medical meetings or appointments, work, or from getting things that you need?: Yes   Physical Activity: Not on file   Stress: Not on file   Social Connections: Not on file   Interpersonal Safety: Low Risk  (7/16/2024)     Interpersonal Safety     Do you feel physically and emotionally safe where you currently live?: Yes     Within the past 12 months, have you been hit, slapped, kicked or otherwise physically hurt by someone?: No     Within the past 12 months, have you been humiliated or emotionally abused in other ways by your partner or ex-partner?: No   Housing Stability: Low Risk  (7/16/2024)    Housing Stability     Do you have housing? : Yes     Are you worried about losing your housing?: No         Review of Systems   A 12-point review of systems was performed. Pertinent findings are noted in the HPI.    Physical Exam   ECOG Status: 0    VITALS: /76   Pulse 100   Wt 66.7 kg (147 lb)   LMP  (LMP Unknown)   SpO2 92%   BMI 22.36 kg/m    GEN: Appears well, alert, oriented, and in NAD  HEENT: EOMI, normal conjunctiva, MMM  CV: Warm and well-perfused  RESP: Breathing comfortably on room air  : Please refer to Dr. Rai's note for detailed  exam  SKIN: Normal color and turgor  NEURO: No focal deficits, CN 3-12 grossly intact  PSYCH: Appropriate mood and affect    Imaging/Path/Labs   Imaging: per hpi     CT 3/31/24      PET/CT 4/29/2024  FDG avid right groin node      FDG avid vulvar lesion      Path: per hpi     Labs: reviewed    Assessment    Ms. Gaspar is a 64 year old female with FIGO stage IIIb squamous cell carcinoma of the vulva s/p radical local excision with bilateral inguinal and right pelvic LND who presents to the clinic for initial consultation regarding adjuvant radiation in management of her disease.    Plan   In-depth discussion was had with Ms. Gaspar and her son Tiago regarding the role of chemoradiation in management of her squamous cell carcinoma of the vulva.  In summary, adjuvant radiation given concurrently with chemotherapy (cisplatin) is used in this context to reduce the rate of local recurrence in the area of gross tumor as well as in areas of violetta involvement.    Her primary vulvar tumor had  negative margins (>10 mm), for this reason we will plan a dose of 50 Gy to the vulva and right groin as well as a dose of 45Gy to the left groin and right pelvic internal/external iliac nodes.  Will plan a sequential boost to the area of FDG avid right groin nodes to a total of 60 Gy.  Any right pelvic lymph nodes that are enlarged on planning CT we will plan for an SIB to 55 Gy.     Risks of radiation to the groin, vulva, and pelvic lymph nodes were discussed in detail with Ms. Gaspar.  These include but are not limited to dermatitis, cystitis, bowel irritation, vaginal irritation/stenosis, lymphedema, and diarrhea.  She expresses understanding of these risks and wishes to proceed with therapy. Tentative start date will be 8/19.       Patient was seen and discussed with Dr. Rai.    Javier Cummings MD  PGY-2  Radiation Oncology

## 2024-08-08 ENCOUNTER — OFFICE VISIT (OUTPATIENT)
Dept: RADIATION ONCOLOGY | Facility: CLINIC | Age: 64
End: 2024-08-08
Attending: RADIOLOGY
Payer: MEDICARE

## 2024-08-08 ENCOUNTER — PRE VISIT (OUTPATIENT)
Dept: RADIATION ONCOLOGY | Facility: CLINIC | Age: 64
End: 2024-08-08
Payer: MEDICARE

## 2024-08-08 VITALS
SYSTOLIC BLOOD PRESSURE: 133 MMHG | HEART RATE: 100 BPM | OXYGEN SATURATION: 92 % | DIASTOLIC BLOOD PRESSURE: 76 MMHG | WEIGHT: 147 LBS | BODY MASS INDEX: 22.36 KG/M2

## 2024-08-08 DIAGNOSIS — C51.9 VULVAR CANCER (H): Primary | ICD-10-CM

## 2024-08-08 PROCEDURE — 99417 PROLNG OP E/M EACH 15 MIN: CPT | Performed by: RADIOLOGY

## 2024-08-08 PROCEDURE — G0463 HOSPITAL OUTPT CLINIC VISIT: HCPCS | Performed by: RADIOLOGY

## 2024-08-08 PROCEDURE — 99205 OFFICE O/P NEW HI 60 MIN: CPT | Mod: 25 | Performed by: RADIOLOGY

## 2024-08-08 PROCEDURE — 77334 RADIATION TREATMENT AID(S): CPT | Mod: 26 | Performed by: RADIOLOGY

## 2024-08-08 PROCEDURE — 77334 RADIATION TREATMENT AID(S): CPT | Performed by: RADIOLOGY

## 2024-08-08 NOTE — PROGRESS NOTES
Radiation Therapy Patient Education    Person involved with teaching: Patient and Son    Patient educational needs for self management of treatment-related side effects assessment completed.  [facility] Patient Ed tab contains Patient Learning Assessment    Education Materials Given  Sim pamphlet and schedule    Educational Topics Discussed  Side effects expected, Pain management, Skin care, Activity, Nutrition and weight loss, and When to call MD/RN    Response To Teaching  Verbalizes understanding. Son ordered Cavelon cream for the start of txm.    GYN Only  Vaginal Dilator-given and educated: not given    Referrals sent: [facility]    Chemotherapy?  Yes:  to Sue re: start date finalized. Pt needs teaching and appointment dates.

## 2024-08-08 NOTE — TELEPHONE ENCOUNTER
MEDICAL RECORDS REQUEST   Radiation Oncology  [address] [place] [address]  Fax: [phone]          FUTURE VISIT INFORMATION                                                   eRgina Gaspar, : 1960 scheduled for future visit at [facility] Radiation Oncology    RECORDS REQUESTED FOR VISIT                                                     GYNECOLOGY STATUS DETAILS   OFFICE NOTE from PCP Epic 24: Brooke Li PA-C   OFFICE NOTE from OB/GYN [facility] 24: Dr. Carolina Hernandez    OFFICE NOTE from gyn onc [facility] 24: Dr. Javier Roche    OPERATIVE/BIOPSY REPORTS (include exam under anes) Epic 24: Radical excision of right vulvar lesion. Removal of lymph nodes from left and right groin. Biopsies of cervix    MEDICATION LIST [facility]    LABS     PATHOLOGY REPORTS Reports in Epic  24: [id number]  24: [id number]   ANYTHING RELATED TO DIAGNOSIS Epic Most recent 24   IMAGING (NEED IMAGES & REPORT)     CT SCANS PACS 24: CT CAP    PET PACS 24: PET Onc Whole Body    PREVIOUS RADIATION     WHAT HEALTHCARE FACILITY [facility]   RADIATION ONCOLOGIST NOTES Epic Consult Only:  24: Dr. Everton Jimenez

## 2024-08-08 NOTE — LETTER
8/8/2024      Regina Gaspar  [address]  [facility] [address]      Dear Colleague,    Thank you for referring your patient, Regina Gaspar, to the [facility] RADIATION ONCOLOGY. Please see a copy of my visit note below.    ATTENDING NOTE:    DIAGNOSIS: Squamous cell carcinoma of the vulva, s/p radical vulvectomy and LND, uK2bY9p, FIGO IIIB.       HISTORY OF PRESENT ILLNESS: ms. Regina Gaspar is a 65 yo female with a newly diagnosed vulva cancer, s/p     She initially presented with ED on 3/31/2024 with 1 month h/o foul-smelling dark yellow vaginal discharge and a lump on her right labia. Exam showed a 3 cm friable lesion. CT of the chest/abdomen/pelvis showed a 2.3 x 2.3 cm low density lymph node I the right groin, but otherwise unremarkable. She then saw Dr. Hernandez in Ob/Gyn on 4/5/2024. Bx of the vulva mass was consistent with well differentiated squamous cell carcinoma, HPV 16 positive. Cervical pap was negative for intraepithelial lesion.     Regina established care with Dr. Roche. PET/CT on 4/29/2024 revealed the vulva lesion to measure 3.2 x 3.6 x 0.8 cm, with a max SUV of 10.37. Additionally, there was a 2.8 x 2.5 cm necrotic right inguinal node with max SUV of 3.58 and a 6 x 12 mm right pelvic side wall node with max SUV of 3.5. A prominent portacaval node measuring 7 mm x 19 mm with max SUV of 2.93 was felt to be reactive.     She proceeded with radical right vulvectomy and lymph node sampling on 5/28/2024. Intra-op findings included grossly abnormal right vulva with a 4 x 4 cm mass involving the mid portion of the labia majora and minora. Enlarged right groin lymph node, enlarged obturator space node. The right obturator node was removed via laparoscopic procedure. Final pathology revealed   1) Vulva: invasive keratinizing squamous cell carcinoma,moderately differentiated, HPV associated, 4.2 cm in greatest dimension, depth of invasion 5 mm. LVSI was not identified. All margins  were greater than 10 mm for invasive, H/CHERYLE or dVIN.   2) L inguinal LND: 0/2   3) R inguinal LND: 3/6, largest measuring 3.6 cm with no DRE  4) R pelvic LND: 0/4   Stage: gV1gW1u, FIGO IIIB    Regina was referred to Dr. Everton Jimenez at [facility] for adjuvant radiation therapy and saw him on 7/16/2024. She was initially scheduled to proceed with a simulation, but ultimately decided tthat being treated at the  while staying at [facility] is easier than driving to [place] daily. She is thus seen today.     On interview, Regina states that she is healing very well from the surgery. She denies pain at the surgical site. She has no vaginal discharge or bleeding. Her bowel movements and bladder function are at baseline.     She is accompanied by her son Tiago, who is getting  on 8/17. Her chemotherapy is scheduled on 8/19/2024.         OTHER RELEVANT HISTORIES:   Single, lives in an apartment in [place], [place]  Son Tiago Gaspar  Sister lives in [place]      EXAM:   /76   Pulse 100   Wt 66.7 kg (147 lb)   LMP  (LMP Unknown)   SpO2 92%   BMI 22.36 kg/m     No acute distress. Good sense of humor. Good spirit.  Pelvic: well healed bilateral groin scars. Evidence of vulvectomy of the right side. The scar is very difficult to visualize, but appears to extend from the vaginal introitus to the clitoris. No visible mass or ulceration.       KEY IMAGING:            ASSESSMENT/PLAN: In summary, Ms. Gaspar is a 63 yo female with a newly diagnosed squamous cell carcinoma of the vulva, s/p right radical vulvectomy and LND. Pathology is significant for finding of 3 positive lymph nodes in the right groin, with the largest measuring 3.6 cm. Of note the initial mildly PET positive right external iliac node was surgically removed, with pathology showing no evidence of malignancy. Her primary tumor was resected to a widely negative margin of > 10 mm. The only risk factor is depth of invasion of 5 mm.     Given the  positive groin nodes, we recommend adjuvant radiation therapy with concurrent Cisplatin. We plan to treat her right pelvic violetta chain, left groin to 45 Gy in 25 fractions with a simultaneous integrated boost of 50 Gy in 25 fractions to the vulva and right groin. Following this, the right groin high risk post-op bed (region of positive lymph nodes) will be sequentially boosted with an additional 10 Gy in 5 fractions. The total dose to the right groin will be 60 Gy in 30 fractions.     We described the logistics and the side effects of the radiation in great details. She would like to proceed and signed the consent form. She will be simulated today. Her start date will be 2024, concurrent with Cisplatin.           Fior Rai MD/PhD  Radiation Oncology  [phone] (pager)           I reviewed patient's chart, internal/external medical records, imaging studies (including actual images), labs and pathology reports.  I interviewed and counseled the patient face to face.  I additionally discussed the case with patient's referring physicians and care team.      90 minutes were spent on the date of the encounter doing chart review, history and exam, documentation and further activities as noted above.           Fior Rai MD        Department of Radiation Oncology  [address] [address]  [phone]       Consultation Note    Name: Regina Gaspar MRN: [id number]   : 1960   Date of Service: 2024  Referring: Dr. Roche     Diagnosis: Squamous cell carcinoma of the vulva  Stage: pT1b,pN2a,M0. FIGO stage IIIB    History of Present Illness   Ms. Gaspar is a 64 year old female with FIGO stage IIIb squamous cell carcinoma of the vulva s/p radical local excision with bilateral inguinal and right pelvic LND who presents to the clinic for initial consultation.    On 3/31/2024 Mr. Gaspar presented to the emergency department with complaint of  vaginal discharge and fevers.  She reported foul-smelling dark yellow vaginal discharge for 1 month with associated vaginal pain, itching, and burning as well as a lump on her right labia.   exam at that time revealed a 3 cm hard friable ulcerated lesion on the right labia minora.  Speculum exam showed normal cervix and no tenderness or masses palpated on bimanual exam.  CT scan of the chest abdomen and pelvis showed a 2.3 x 2.3 cm lymph node within the right groin, no other evidence of metastatic disease was present.    On 4/5/2024, she underwent a vulvar biopsy.  Subsequent pathology revealed well-differentiated squamous cell carcinoma, superficially invasive.  Pap smear at this time is negative.    She had initial consultation with Dr. Roche on 4/25/2024.   exam at this time showed a 4 x 4 centimeter exophytic lesion on the right mid vulva with an area of normal tissue between mass and urethra.  Vaginal surface and cervix were visually normal.  1 palpable right sided medial inguinal lymph node was enlarged and minimally mobile/nontender.  Bimanual exam was unremarkable.    PET/CT was performed on 4/29/2024 and revealed an FDG avid lesion within the right vulva as well as a 2.8 x 2.5 cm necrotic right inguinal lymph node, as well as an indeterminate 1.2 x 0.6 cm mildly avid right obturator internus/pelvic sidewall node.  There was no evidence of distant metastases.    She underwent radical excision of the right vulvar lesion with bilateral inguinal and right pelvic LND with Dr. Roche on 5/28/2024.  Subsequent pathology of the vulva revealed invasive keratinizing squamous cell carcinoma, moderately differentiated, HPV associated measuring 4.2 cm with a depth of invasion 5 mm.  Surgical margins were negative.  3/6 right inguinal femoral lymph nodes were positive for metastatic SCC with the largest deposit being 3.6 cm and no extracapsular extension identified.  4 right pelvic, and 2 left inguinal femoral  lymph nodes were all negative for malignancy (3/12 total postive).  Final staging pT1b,pN21,M0 FIGO stage IIIB. No LVSI or DVIN.     She is accompanied by her son Tiago today.  Overall she reports healing well from surgery she denies any vaginal bleeding or discharge at this time.  Her postoperative pain has also been well-managed.  She previously met with Dr. Jimenez to discuss radiation therapy.        CHEMOTHERAPY HISTORY: None  PACEMAKER: No   PREGNANCY STATUS: Not Pregnant   PAST RADIATION THERAPY HISTORY: None    PAST MEDICAL HISTORY:  Past Medical History:   Diagnosis Date     Vulvar cancer (H) 2024       PAST SURGICAL HISTORY:  Past Surgical History:   Procedure Laterality Date      SECTION       LAPAROSCOPIC LYMPHADENECTOMY N/A 2024    Procedure: Laparoscopic lymphadenectomy;  Surgeon: Javier Roche MD;  Location: [facility] OR     VULVECTOMY RADICAL DISSECT GROIN(S) N/A 2024    Procedure: Radical excision of right vulvar lesion. Removal of lymph nodes from left and right groin. Biopsies of cervix.;  Surgeon: Javier Roche MD;  Location: [facility] OR       ALLERGIES:  Allergies as of 2024     (No Known Allergies)       MEDICATIONS:  Current Outpatient Medications   Medication Sig Dispense Refill     traZODone (DESYREL) 100 MG tablet Take 1-2 tablets (100-200 mg) by mouth at bedtime 180 tablet 1        FAMILY HISTORY:  Family History   Problem Relation Age of Onset     Breast Cancer Mother         has had a couple times     Colon Cancer Father 80     Uterine Cancer No family hx of        SOCIAL HISTORY:  Social History     Socioeconomic History     Marital status: Single     Spouse name: Not on file     Number of children: 1     Years of education: Not on file     Highest education level: Not on file   Occupational History     Not on file   Tobacco Use     Smoking status: Every Day     Types: Cigarettes     Smokeless tobacco: Never     Tobacco comments:     50 pack years.  8/8/24, has not had a cigarette in a couple weeks   Vaping Use     Vaping status: Never Used   Substance and Sexual Activity     Alcohol use: Yes     Comment: quart vodka/ week     Drug use: Never     Sexual activity: Not Currently   Other Topics Concern     Not on file   Social History Narrative     Not on file     Social Determinants of Health     Financial Resource Strain: Low Risk  (7/16/2024)    Financial Resource Strain      Within the past 12 months, have you or your family members you live with been unable to get utilities (heat, electricity) when it was really needed?: No   Food Insecurity: Low Risk  (7/16/2024)    Food Insecurity      Within the past 12 months, did you worry that your food would run out before you got money to buy more?: No      Within the past 12 months, did the food you bought just not last and you didn t have money to get more?: No   Transportation Needs: High Risk (7/16/2024)    Transportation Needs      Within the past 12 months, has lack of transportation kept you from medical appointments, getting your medicines, non-medical meetings or appointments, work, or from getting things that you need?: Yes   Physical Activity: Not on file   Stress: Not on file   Social Connections: Not on file   Interpersonal Safety: Low Risk  (7/16/2024)    Interpersonal Safety      Do you feel physically and emotionally safe where you currently live?: Yes      Within the past 12 months, have you been hit, slapped, kicked or otherwise physically hurt by someone?: No      Within the past 12 months, have you been humiliated or emotionally abused in other ways by your partner or ex-partner?: No   Housing Stability: Low Risk  (7/16/2024)    Housing Stability      Do you have housing? : Yes      Are you worried about losing your housing?: No         Review of Systems   A 12-point review of systems was performed. Pertinent findings are noted in the HPI.    Physical Exam   ECOG Status: 0    VITALS: /76    Pulse 100   Wt 66.7 kg (147 lb)   LMP  (LMP Unknown)   SpO2 92%   BMI 22.36 kg/m    GEN: Appears well, alert, oriented, and in NAD  HEENT: EOMI, normal conjunctiva, MMM  CV: Warm and well-perfused  RESP: Breathing comfortably on room air  : Please refer to Dr. Rai's note for detailed  exam  SKIN: Normal color and turgor  NEURO: No focal deficits, CN 3-12 grossly intact  PSYCH: Appropriate mood and affect    Imaging/Path/Labs   Imaging: per hpi     CT 3/31/24      PET/CT 4/29/2024  FDG avid right groin node      FDG avid vulvar lesion      Path: per hpi     Labs: reviewed    Assessment    Ms. Gaspar is a 64 year old female with FIGO stage IIIb squamous cell carcinoma of the vulva s/p radical local excision with bilateral inguinal and right pelvic LND who presents to the clinic for initial consultation regarding adjuvant radiation in management of her disease.    Plan   In-depth discussion was had with Ms. Gaspar and her son Tiago regarding the role of chemoradiation in management of her squamous cell carcinoma of the vulva.  In summary, adjuvant radiation given concurrently with chemotherapy (cisplatin) is used in this context to reduce the rate of local recurrence in the area of gross tumor as well as in areas of violetta involvement.    Her primary vulvar tumor had negative margins (>10 mm), for this reason we will plan a dose of 50 Gy to the vulva and right groin as well as a dose of 45Gy to the left groin and right pelvic internal/external iliac nodes.  Will plan a sequential boost to the area of FDG avid right groin nodes to a total of 60 Gy.  Any right pelvic lymph nodes that are enlarged on planning CT we will plan for an SIB to 55 Gy.     Risks of radiation to the groin, vulva, and pelvic lymph nodes were discussed in detail with Ms. Gaspar.  These include but are not limited to dermatitis, cystitis, bowel irritation, vaginal irritation/stenosis, lymphedema, and diarrhea.  She expresses understanding of  "these risks and wishes to proceed with therapy. Tentative start date will be 8/19.       Patient was seen and discussed with Dr. Rai.    Javier Cummings MD  PGY-2  Radiation Oncology        Oncology Rooming Note    August 8, 2024 1:34 PM   Regina Gaspar is a 64 year old female who presents for:    Chief Complaint   Patient presents with     Cancer     Radiation consult     Initial Vitals: /76   Pulse 100   Wt 66.7 kg (147 lb)   LMP  (LMP Unknown)   SpO2 92%   BMI 22.36 kg/m   Estimated body mass index is 22.36 kg/m  as calculated from the following:    Height as of 7/16/24: 1.727 m (5' 7.99\").    Weight as of this encounter: 66.7 kg (147 lb). Body surface area is 1.79 meters squared.  No Pain (0) Comment: Data Unavailable   No LMP recorded (lmp unknown). Patient is postmenopausal.  Allergies reviewed: Yes  Medications reviewed: Yes    Medications: Medication refills not needed today.  Pharmacy name entered into [facility]: [facility] [address] [place] [address] [address]    Frailty Screening:   Is the patient here for a new oncology consult visit in cancer care? 2. No      Clinical concerns: Here for consultation, MD aware.    Considerations for radiation treatment   Pregnancy status: Female age 55+   Implanted Cardiac Devices: No   Any previous radiation therapy: No       Melita Siu RN                INITIAL PATIENT ASSESSMENT    Diagnosis: Vulvar cancer, Vulvectomy 5/28/24    Prior radiation therapy: None    Prior chemotherapy: None    Prior hormonal therapy:No    Pain Eval:  Denies    Psychosocial  Living arrangements: Self  Fall Risk: independent   referral needs: Not needed    Advanced Directive: No  Implantable Cardiac Device? No    Onset of menarche: age 13  LMP: No LMP recorded (lmp unknown). Patient is postmenopausal.  Onset of menopause: age 43  Abnormal vaginal bleeding/discharge: No  Are you pregnant? No  Reproductive note: 1 child    Nurse face-to-face time: Level 4:  15 " min face to face time      Again, thank you for allowing me to participate in the care of your patient.        Sincerely,        Fior Rai MD

## 2024-08-08 NOTE — LETTER
8/8/2024      Regina Gaspar  [address]  [facility] [address]      Dear Colleague,    Thank you for referring your patient, Regina Gaspar, to the [facility] RADIATION ONCOLOGY. Please see a copy of my visit note below.    Radiation Therapy Patient Education    Person involved with teaching: Patient and Son    Patient educational needs for self management of treatment-related side effects assessment completed.  EPIC Patient Ed tab contains Patient Learning Assessment    Education Materials Given  Sim pamphlet and schedule    Educational Topics Discussed  Side effects expected, Pain management, Skin care, Activity, Nutrition and weight loss, and When to call MD/RN    Response To Teaching  Verbalizes understanding. Son ordered Cavelon cream for the start of txm.    GYN Only  Vaginal Dilator-given and educated: not given    Referrals sent: [facility]    Chemotherapy?  Yes: Msg to Sue re: start date finalized. Pt needs teaching and appointment dates.         Again, thank you for allowing me to participate in the care of your patient.        Sincerely,        Fior Rai MD

## 2024-08-08 NOTE — PROGRESS NOTES
INITIAL PATIENT ASSESSMENT    Diagnosis: Vulvar cancer, Vulvectomy 5/28/24    Prior radiation therapy: None    Prior chemotherapy: None    Prior hormonal therapy:No    Pain Eval:  Denies    Psychosocial  Living arrangements: Self  Fall Risk: independent   referral needs: Not needed    Advanced Directive: No  Implantable Cardiac Device? No    Onset of menarche: age 13  LMP: No LMP recorded (lmp unknown). Patient is postmenopausal.  Onset of menopause: age 43  Abnormal vaginal bleeding/discharge: No  Are you pregnant? No  Reproductive note: 1 child    Nurse face-to-face time: Level 4:  15 min face to face time

## 2024-08-08 NOTE — PROGRESS NOTES
"Oncology Rooming Note    August 8, 2024 1:34 PM   Regina Gaspar is a 64 year old female who presents for:    Chief Complaint   Patient presents with    Cancer     Radiation consult     Initial Vitals: /76   Pulse 100   Wt 66.7 kg (147 lb)   LMP  (LMP Unknown)   SpO2 92%   BMI 22.36 kg/m   Estimated body mass index is 22.36 kg/m  as calculated from the following:    Height as of 7/16/24: 1.727 m (5' 7.99\").    Weight as of this encounter: 66.7 kg (147 lb). Body surface area is 1.79 meters squared.  No Pain (0) Comment: Data Unavailable   No LMP recorded (lmp unknown). Patient is postmenopausal.  Allergies reviewed: Yes  Medications reviewed: Yes    Medications: Medication refills not needed today.  Pharmacy name entered into [facility]: [facility] [place] [place] - [facility] [address]    Frailty Screening:   Is the patient here for a new oncology consult visit in cancer care? 2. No      Clinical concerns: Here for consultation, MD aware.    Considerations for radiation treatment   Pregnancy status: Female age 55+   Implanted Cardiac Devices: No   Any previous radiation therapy: No       Melita Siu RN              "

## 2024-08-12 ENCOUNTER — PATIENT OUTREACH (OUTPATIENT)
Dept: CARE COORDINATION | Facility: CLINIC | Age: 64
End: 2024-08-12
Payer: MEDICARE

## 2024-08-12 NOTE — PROGRESS NOTES
Social Work - Telephone/[employer] message  [facility]  Data:   Patient Name: Regina Gaspar  Goes By: Regina    /Age: 1960 (64 year old)     Intervention: SW left voicemail for Regina to discuss [facility] as her treatment is scheduled for next week.   Plan:  will await patient's return phone call/message and provide assistance at that time.      GIOVANNA Dalal, [facility]  Adult Oncology Clinics  [place] (M,W), [place] (T) & [place] (Th)  *I am off Friday  Office: [phone]

## 2024-08-13 ENCOUNTER — PATIENT OUTREACH (OUTPATIENT)
Dept: CARE COORDINATION | Facility: CLINIC | Age: 64
End: 2024-08-13
Payer: MEDICARE

## 2024-08-13 NOTE — PROGRESS NOTES
Social Work - Follow-Up  [facility]    Data/Intervention:    Patient Name: Regina Gaspar Goes By: Regina SINGHB/Age: 1960 (64 year old)    Reason for Follow-Up:  Poonam Morris    Collaborated With:    -Daquan Cramer RN    Intervention/Education/Resources Provided:  Completed and sent Poonam Morris referral   Sent caregiver exemption form to Daquan Cramer to obtain MD signature    Assessment/Plan:  Previously provided patient/family with writer's contact information and availability.    GIOVANNA Dalal, [facility]  Adult Oncology Clinics  [place] (M,W), [place] (T) & [place] (Th)  *I am off Friday  Office: [phone]

## 2024-08-14 ENCOUNTER — CARE COORDINATION (OUTPATIENT)
Dept: RADIATION ONCOLOGY | Facility: CLINIC | Age: 64
End: 2024-08-14
Payer: MEDICARE

## 2024-08-14 ENCOUNTER — PATIENT OUTREACH (OUTPATIENT)
Dept: CARE COORDINATION | Facility: CLINIC | Age: 64
End: 2024-08-14
Payer: MEDICARE

## 2024-08-14 NOTE — PROGRESS NOTES
Social Work - Telephone/MyChart message  [facility]  Data:   Patient Name: Regina Gaspar  Goes By: Regina FISHER/Age: 1960 (64 year old)    Intervention: SW received voicemail from Regina confirming that she plans to go to [facility] on . SW called Regina back and left her a voicemail that she needs to call [facility] directly to confirm.   Plan:  will await patient's return phone call/message and provide assistance at that time.   GIOVANNA Dalal, [facility]  Adult Oncology Clinics  [place] (M,W), [place] (T) & [place] (Th)  *I am off Friday  Office: [phone]

## 2024-08-14 NOTE — PROGRESS NOTES
Virtual Visit Details    Type of service:  Telephone Visit   Phone call duration: 26 minutes   Originating Location (pt. 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  She presents for a disease management visit by phone.    Oncology History:  3/2024: Initially, she was seen in the ED visit 3/2024 with a vulvar lesion and some early satiety.      3/31/24 CT:  IMPRESSION:  1.  2.3 x 2.3 cm low-density lymph node or possible small fluid collection noted within the right groin. Could consider ultrasound and/or biopsy as clinically indicated.   2.  No other evidence of malignancy within the chest, abdomen, and pelvis.    24: PAP NILM, HPV 16+. Vulva, right, biopsy-  Well differentiated squamous cell carcinoma with keratinization, superficially invasive, incompletely excised     24 Radical local excision, bilateral inguinal and right pelvic LND  A. Vulva. 12 o'clock margin, biopsy:  - Squamous epithelium with reactive changes  - Negative for dysplasia or malignancy  B. Vulva, 3 o'clock margin, biopsy:   - Squamous epithelium with reactive changes  - Negative for dysplasia or malignancy  C. Vulva, 6 o'clock margin, biopsy:  - Squamous epithelium with reactive changes  - Negative for dysplasia or malignancy  D. Lymph node, right inguinal femoral, lymphadenectomy:  - METASTATIC SQUAMOUS CELL CARCINOMA in three of six lymph nodes (3/6)  - Largest metastatic deposit measures 3.6 cm  - No extra capsular extension identified  E. Lymph node, left inguinal femoral, lymphadenectomy:  - Two reactive lymph nodes examined, negative for malignancy (0/2)  F. Vulva, right,  radical excision:  - INVASIVE KERATINIZING SQUAMOUS CELL CARCINOMA, moderately differentiated, HPV associated  - Size: 4.2 cm  - Depth of invasion 5 mm  - Surgical margins are negative for high grade dysplasia or carcinoma   G. Endocervix, curetting:  - Predominantly mucus with rare fragments of ectocervical and endocervical epithelium with reactive changes  - Negative for dysplasia or malignancy  H. Cervix, random, biopsies:  - Ectocervical and endocervical junctional tissue with reactive epithelial changes  - Negative for dysplasia or malignancy   I. Lymph node, right pelvic, lymphadenectomy:  - Four reactive lymph nodes examined, negative for malignancy (0/4)    Plan: Chemoradiation with weekly cisplatin 40 mg/m2     24: Cycle 1 day 1 cisplatin.            Today she reports;    -Vulvar/vaginal bleeding/discharge: No  -Pain: No pain  -Nausea or vomiting: No  -Appetite: Decreased appetite  -Hydration: Drinking plenty of fluids.  Body armour 12 oz at least 2 and ensure 8 oz x 2 + 16 oz x 4 water  -Weight loss: Weight has been stable  -Bowel/bladder habits: Usually normal, some diarrhea last night  -Neuropathy symptoms: No  -Leg swelling: No  -Fevers: No  -Chest pain: No  -SOB: no  -DM No  -Tinnutis No  Son getting  this weekend              Past Medical History:    Past Medical History:   Diagnosis Date    Vulvar cancer (H) 2024         Past Surgical History:    Past Surgical History:   Procedure Laterality Date     SECTION      LAPAROSCOPIC LYMPHADENECTOMY N/A 2024    Procedure: Laparoscopic lymphadenectomy;  Surgeon: Javier Roche MD;  Location: [facility] OR    VULVECTOMY RADICAL DISSECT GROIN(S) N/A 2024    Procedure: Radical excision of right vulvar lesion. Removal of lymph nodes from left and right groin. Biopsies of cervix.;  Surgeon: Javier Roche MD;  Location:  OR         Health Maintenance Due   Topic Date Due    DEXA  Never done    ADVANCE CARE PLANNING   Never done    DEPRESSION ACTION PLAN  Never done    MAMMO SCREENING  Never done    Pneumococcal Vaccine: 65+ Years (1 of 2 - PCV) Never done    COLORECTAL CANCER SCREENING  Never done    HIV SCREENING  Never done    HEPATITIS C SCREENING  Never done    ZOSTER IMMUNIZATION (1 of 2) Never done    LIPID  Never done    RSV VACCINE (Pregnancy & 60+) (1 - 1-dose 60+ series) Never done    COVID-19 Vaccine (3 - Pfizer risk series) 11/22/2021    MEDICARE ANNUAL WELLNESS VISIT  Never done    COLPOSCOPY  Never done       Current Medications:     Current Outpatient Medications   Medication Sig Dispense Refill    traZODone (DESYREL) 100 MG tablet Take 1-2 tablets (100-200 mg) by mouth at bedtime 180 tablet 1         Allergies:      No Known Allergies     Social History:     Social History     Tobacco Use    Smoking status: Every Day     Types: Cigarettes    Smokeless tobacco: Never    Tobacco comments:     50 pack years. 8/8/24, has not had a cigarette in a couple weeks   Substance Use Topics    Alcohol use: Yes     Comment: quart vodka/ week       History   Drug Use Unknown         Family History:     The patient's family history is notable for:    Family History   Problem Relation Age of Onset    Breast Cancer Mother         has had a couple times    Colon Cancer Father 80    Uterine Cancer No family hx of          Physical Exam:     LMP  (LMP Unknown)   There is no height or weight on file to calculate BMI.    Respiratory: No audible wheeze, cough.      Psychiatric: appropriate mood and affect. Mentation appears normal, affect normal/bright, judgement and insight intact, normal speech       The rest of a comprehensive physical examination is deferred due to phone visit restrictions.      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  She presents for a disease management visit by phone.      26 minutes spent on the date of the  encounter doing chart review, history and exam, documentation, and further activities as noted above.              Plan:     1.) Vulvar cancer:  Reviewed patient s diagnosis and treatment plan (including cisplatin) as recommended by Dr. Roche. The goal of treatment is curative intent.  OK to proceed with cycle 1 of treatment Monday if labs are WDL.  RTC in 1 week for symptom assessment.  Reviewed signs and symptoms for when she should contact the clinic or seek additional care; and contact information of the Gynecologic Oncology Clinic.  Patient to contact the clinic with any questions or concerns in the interim.        Discussed that the administration of chemotherapy can carry certain risks, including failure to obtain the desired result, discomforts, injury, and the need for additional therapy. Reviewed possible side effects of these drugs including: Allergic reaction; Pancytopenia including Anemia causing weakness/fatigue, Low WBC causing infection, Low platelets causing bruising/bleeding; Mouth sores; Hair loss; Fatigue; Brief periods of forgetfulness; Nausea and vomiting; Neuropathy; Diarrhea/Constipation; Hair loss; Skin and nail changes; Liver effects; Heart effects; Kidney/Bladder effects; Lung effects; Loss of appetite; Weight gain or loss; Visual/Auditory changes; Sexual effects; Mood effects; Menopausal symptoms; Reproductive/Fertility Effects; Other. Discussed that the patient may have side effects from chemotherapy that have not been discussed today because each patient may respond differently to chemotherapy and could have side effects that have not been previously reported by others.     Discussed ways to manage certain side effects at home and when to call the clinic or go to the emergency department.     Patient was provided by RNCC with a copy of [facility] drug information regarding (cisplatin drugs); [facility] Cancer Care packet including: Getting Ready for Chemotherapy, Comparing  Chemotherapy, Immunotherapy, and targeted Therapy, Your Cancer Care team and MyChart, Understanding Clinical Trials, Honoring Choices, Cancer Care Services, and Integrative Therapies.     Reviewed resources available including nutrition services, rehabilitation and exercise, pharmacy services, tobacco cessation programs, social work services, spiritual health, cancer risk management program, cancer survivorship program, and palliative care program.     Discussed nutrition and the importance of eating small frequent meal, high protein, and drinking 8-10 glasses of noncaffeinated and nonalcoholic beverages.         Genetic risk factors were assessed and she does not meet qualification for referral.    Labs and/or tests ordered include:  None.     2.) Health maintenance:  Issues addressed today include following up with PCP for annual health maintenance and non-gynecologic issues.       Silvana Marin, DNP, APRN, FNP-C, AOCNP  Oncology Nurse Practitioner   Division of Gynecologic Oncology  Pager: [phone]     CC  Patient Care Team:  Brooke Li PA-C as PCP - General (Family Medicine)  Carolina Hernandez DO as Physician (OB/Gyn)  Javier Roche MD as MD (Gynecologic Oncology)  Carolina Hernandez DO as Assigned OBGYN Provider  Daquan Cramer, RN as Specialty Care Coordinator (Hematology & Oncology)  Javier Roche MD as Assigned Cancer Care Provider  Caio Jimenez MD as Physician (Radiation Oncology)  Everton Jimenez MD as MD (Radiation Oncology)  Brooke Li PA-C as Assigned PCP  SELF, REFERRED

## 2024-08-16 ENCOUNTER — VIRTUAL VISIT (OUTPATIENT)
Dept: ONCOLOGY | Facility: CLINIC | Age: 64
End: 2024-08-16
Attending: NURSE PRACTITIONER
Payer: MEDICARE

## 2024-08-16 DIAGNOSIS — C51.9 VULVAR CANCER (H): Primary | ICD-10-CM

## 2024-08-16 PROCEDURE — 99443 PR PHYSICIAN TELEPHONE EVALUATION 21-30 MIN: CPT | Mod: 93 | Performed by: NURSE PRACTITIONER

## 2024-08-16 RX ORDER — ONDANSETRON 8 MG/1
8 TABLET, FILM COATED ORAL EVERY 8 HOURS PRN
Qty: 30 TABLET | Refills: 2 | Status: SHIPPED | OUTPATIENT
Start: 2024-08-16

## 2024-08-16 RX ORDER — DEXAMETHASONE 4 MG/1
8 TABLET ORAL DAILY
Qty: 12 TABLET | Refills: 2 | Status: SHIPPED | OUTPATIENT
Start: 2024-08-16

## 2024-08-16 RX ORDER — PROCHLORPERAZINE MALEATE 5 MG
5 TABLET ORAL EVERY 6 HOURS PRN
Qty: 30 TABLET | Refills: 2 | Status: SHIPPED | OUTPATIENT
Start: 2024-08-16

## 2024-08-16 ASSESSMENT — PAIN SCALES - GENERAL: PAINLEVEL: NO PAIN (0)

## 2024-08-16 NOTE — PATIENT INSTRUCTIONS
Your visit today was with [facility] Teran CNP for chemotherapy education.    Plan:  Your proposed regimen: cisplatin with radiation therapy  Schedule:  Weekly x 5-6 weeks  You will have a lab appointment and a provider appointment prior to your infusions- please note these times and arrive at the scheduled appointment times for both lab and your provider appointments 10-15 minutes early if possible.    Your team:  Gynecologic oncologist: Javier Roche MD  RN care coordinator: Sue Do RNCC; [phone]  Nurse practitioner:Silvana Marin CNP  Triage RN line: [place] triage: [phone]    Your take home medications:  -Dexamethasone- This is a steroid and can help prevent nausea. Take 8mg daily for three days in a row, starting the morning after chemotherapy. Take with food and water.  This medication can cause elevated blood sugars, difficulty sleeping, agitation, anxiety, and jitteriness.  If you notice these symptoms you can stop it.  -Prochlorperazine (aka Compazine)- this is an anti-nausea medication that you can take as needed. The dose is one tablet every six hours as needed.  -Ondansetron (aka Zofran)- this is an anti-nausea medication that you can take as needed. The dose is one tablet every eight hours as needed. Wait until 72 hours after chemotherapy to take this medication as you were given a long acting medication in this same drug class.    When to call for help:  A fever of 100.4 F or greater  Shaking chills  Shortness of breath or difficulty breathing  Repeated signs of bleeding, such as nose bleeds that do not stop with pressure, vaginal bleeding that saturates a pad an hour for two hours in a row, easy bruising or black tarry stools  Mouth sores that stop you from eating or cause pain when you swallow  Nausea and vomiting that do not get better with your medications  Diarrhea that does not get better (particularly three or more loose watery stools in one day)  Extreme  weakness  Feeling confused or extremely sleepy  Constipation for more than 48 hours that does not respond to laxatives  New rashes  New swelling in the legs, arms, or face- particularly if one leg is more swollen than the other, hot, red, or painful  Any new symptoms that you did not have before your treatments    WHO to call for help:  Call your RN care coordinator first- if you have difficulty reaching her, then call the triage line. If you have difficulty reaching triage during off hours, you can call [phone] and ask to speak to the gynecologic oncology resident on call.    For urgent questions or concerns, please call rather than send a medical message.

## 2024-08-16 NOTE — LETTER
2024      Regina Gaspar  [address]  [facility] [address]      Dear Colleague,    Thank you for referring your patient, Regina Gaspar, to the [facility]. Please see a copy of my visit note below.    Virtual Visit Details    Type of service:  Telephone Visit   Phone call duration: 26 minutes   Originating Location (pt. 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  She presents for a disease management visit by phone.    Oncology History:  3/2024: Initially, she was seen in the ED visit 3/2024 with a vulvar lesion and some early satiety.      3/31/24 CT:  IMPRESSION:  1.  2.3 x 2.3 cm low-density lymph node or possible small fluid collection noted within the right groin. Could consider ultrasound and/or biopsy as clinically indicated.   2.  No other evidence of malignancy within the chest, abdomen, and pelvis.    24: PAP NILM, HPV 16+. Vulva, right, biopsy-  Well differentiated squamous cell carcinoma with keratinization, superficially invasive, incompletely excised     24 Radical local excision, bilateral inguinal and right pelvic LND  A. Vulva. 12 o'clock margin, biopsy:  - Squamous epithelium with reactive changes  - Negative for dysplasia or malignancy  B. Vulva, 3 o'clock margin, biopsy:   - Squamous epithelium with reactive changes  - Negative for dysplasia or malignancy  C. Vulva, 6 o'clock margin, biopsy:  - Squamous epithelium with reactive changes  - Negative for dysplasia or malignancy  D. Lymph node, right inguinal femoral, lymphadenectomy:  - METASTATIC SQUAMOUS CELL CARCINOMA in three of six lymph  nodes (3/6)  - Largest metastatic deposit measures 3.6 cm  - No extra capsular extension identified  E. Lymph node, left inguinal femoral, lymphadenectomy:  - Two reactive lymph nodes examined, negative for malignancy (0/2)  F. Vulva, right, radical excision:  - INVASIVE KERATINIZING SQUAMOUS CELL CARCINOMA, moderately differentiated, HPV associated  - Size: 4.2 cm  - Depth of invasion 5 mm  - Surgical margins are negative for high grade dysplasia or carcinoma   G. Endocervix, curetting:  - Predominantly mucus with rare fragments of ectocervical and endocervical epithelium with reactive changes  - Negative for dysplasia or malignancy  H. Cervix, random, biopsies:  - Ectocervical and endocervical junctional tissue with reactive epithelial changes  - Negative for dysplasia or malignancy   I. Lymph node, right pelvic, lymphadenectomy:  - Four reactive lymph nodes examined, negative for malignancy (0/4)    Plan: Chemoradiation with weekly cisplatin 40 mg/m2     24: Cycle 1 day 1 cisplatin.            Today she reports;    -Vulvar/vaginal bleeding/discharge: No  -Pain: No pain  -Nausea or vomiting: No  -Appetite: Decreased appetite  -Hydration: Drinking plenty of fluids.  Body armour 12 oz at least 2 and ensure 8 oz x 2 + 16 oz x 4 water  -Weight loss: Weight has been stable  -Bowel/bladder habits: Usually normal, some diarrhea last night  -Neuropathy symptoms: No  -Leg swelling: No  -Fevers: No  -Chest pain: No  -SOB: no  -DM No  -Tinnutis No  Son getting  this weekend              Past Medical History:    Past Medical History:   Diagnosis Date     Vulvar cancer (H) 2024         Past Surgical History:    Past Surgical History:   Procedure Laterality Date      SECTION       LAPAROSCOPIC LYMPHADENECTOMY N/A 2024    Procedure: Laparoscopic lymphadenectomy;  Surgeon: Javier Roche MD;  Location: [facility] OR     VULVECTOMY RADICAL DISSECT GROIN(S) N/A 2024    Procedure: Radical excision  of right vulvar lesion. Removal of lymph nodes from left and right groin. Biopsies of cervix.;  Surgeon: Javier Roche MD;  Location: [facility] OR         Health Maintenance Due   Topic Date Due     DEXA  Never done     ADVANCE CARE PLANNING  Never done     DEPRESSION ACTION PLAN  Never done     MAMMO SCREENING  Never done     Pneumococcal Vaccine: 65+ Years (1 of 2 - PCV) Never done     COLORECTAL CANCER SCREENING  Never done     HIV SCREENING  Never done     HEPATITIS C SCREENING  Never done     ZOSTER IMMUNIZATION (1 of 2) Never done     LIPID  Never done     RSV VACCINE (Pregnancy & 60+) (1 - 1-dose 60+ series) Never done     COVID-19 Vaccine (3 - Pfizer risk series) 11/22/2021     MEDICARE ANNUAL WELLNESS VISIT  Never done     COLPOSCOPY  Never done       Current Medications:     Current Outpatient Medications   Medication Sig Dispense Refill     traZODone (DESYREL) 100 MG tablet Take 1-2 tablets (100-200 mg) by mouth at bedtime 180 tablet 1         Allergies:      No Known Allergies     Social History:     Social History     Tobacco Use     Smoking status: Every Day     Types: Cigarettes     Smokeless tobacco: Never     Tobacco comments:     50 pack years. 8/8/24, has not had a cigarette in a couple weeks   Substance Use Topics     Alcohol use: Yes     Comment: quart vodka/ week       History   Drug Use Unknown         Family History:     The patient's family history is notable for:    Family History   Problem Relation Age of Onset     Breast Cancer Mother         has had a couple times     Colon Cancer Father 80     Uterine Cancer No family hx of          Physical Exam:     LMP  (LMP Unknown)   There is no height or weight on file to calculate BMI.    Respiratory: No audible wheeze, cough.      Psychiatric: appropriate mood and affect. Mentation appears normal, affect normal/bright, judgement and insight intact, normal speech       The rest of a comprehensive physical examination is deferred due to phone  visit restrictions.      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  She presents for a disease management visit by phone.      26 minutes spent on the date of the encounter doing chart review, history and exam, documentation, and further activities as noted above.              Plan:     1.) Vulvar cancer:  Reviewed patient s diagnosis and treatment plan (including cisplatin) as recommended by Dr. Roche. The goal of treatment is curative intent.  OK to proceed with cycle 1 of treatment Monday if labs are WDL.  RTC in 1 week for symptom assessment.  Reviewed signs and symptoms for when she should contact the clinic or seek additional care; and contact information of the Gynecologic Oncology Clinic.  Patient to contact the clinic with any questions or concerns in the interim.        Discussed that the administration of chemotherapy can carry certain risks, including failure to obtain the desired result, discomforts, injury, and the need for additional therapy. Reviewed possible side effects of these drugs including: Allergic reaction; Pancytopenia including Anemia causing weakness/fatigue, Low WBC causing infection, Low platelets causing bruising/bleeding; Mouth sores; Hair loss; Fatigue; Brief periods of forgetfulness; Nausea and vomiting; Neuropathy; Diarrhea/Constipation; Hair loss; Skin and nail changes; Liver effects; Heart effects; Kidney/Bladder effects; Lung effects; Loss of appetite; Weight gain or loss; Visual/Auditory changes; Sexual effects; Mood effects; Menopausal symptoms; Reproductive/Fertility Effects; Other. Discussed that the patient may have side effects from chemotherapy that have not been discussed today because each patient may respond differently to chemotherapy and could have side effects that have not been previously reported by others.     Discussed ways to manage certain side effects at home and  when to call the clinic or go to the emergency department.     Patient was provided by RNCC with a copy of [facility] drug information regarding (cisplatin drugs); [employer] [facility] Cancer Care packet including: Getting Ready for Chemotherapy, Comparing Chemotherapy, Immunotherapy, and targeted Therapy, Your Cancer Care team and MyChart, Understanding Clinical Trials, Honoring Choices, Cancer Care Services, and Integrative Therapies.     Reviewed resources available including nutrition services, rehabilitation and exercise, pharmacy services, tobacco cessation programs, social work services, spiritual health, cancer risk management program, cancer survivorship program, and palliative care program.     Discussed nutrition and the importance of eating small frequent meal, high protein, and drinking 8-10 glasses of noncaffeinated and nonalcoholic beverages.         Genetic risk factors were assessed and she does not meet qualification for referral.    Labs and/or tests ordered include:  None.     2.) Health maintenance:  Issues addressed today include following up with PCP for annual health maintenance and non-gynecologic issues.       Silvana Marin, DNP, APRN, FNP-C, AOCNP  Oncology Nurse Practitioner   Division of Gynecologic Oncology  Pager: [phone]     CC  Patient Care Team:  Brooke Li PA-C as PCP - General (Family Medicine)  Carolina Hernandez DO as Physician (OB/Gyn)  Javier Roche MD as MD (Gynecologic Oncology)  Carolina Hernandez DO as Assigned OBGYN Provider  Daquan Cramer RN as Specialty Care Coordinator (Hematology & Oncology)  Javier Roche MD as Assigned Cancer Care Provider  Caio Jimenez MD as Physician (Radiation Oncology)  Everton Jimenez MD as MD (Radiation Oncology)  Brooke Li PA-C as Assigned PCP  SELF, REFERRED            Again, thank you for allowing me to participate in the care of your patient.        Sincerely,        Silvana Marin  APRN CNP

## 2024-08-16 NOTE — NURSING NOTE
Current patient location: [address] [place] [address] [address]   [facility] [address]    Is the patient currently in the state of [place]? YES    Visit mode:TELEPHONE    If the visit is dropped, the patient can be reconnected by: TELEPHONE VISIT: Phone number:   Telephone Information:   Mobile [phone]       Will anyone else be joining the visit? NO  (If patient encounters technical issues they should call [phone])    How would you like to obtain your AVS? Mail a copy    Are changes needed to the allergy or medication list? No    Are refills needed on medications prescribed by this physician? NO    Rooming Documentation:  Questionnaire(s) completed      Reason for visit: RECHECK Shelby Kocher VVF

## 2024-08-16 NOTE — PROGRESS NOTES
"Virtual Visit Details    Type of service:  Telephone Visit   Phone call duration: *** minutes   Originating Location (pt. Location): {patient location:312881::\"Home\"}  {PROVIDER LOCATION On-site should be selected for visits conducted from your clinic location or adjoining [facility] hospital, academic office, or other nearby [facility] building. Off-site should be selected for all other provider locations, including home:[id number]}  Distant Location (provider location):  {virtual location provider:[id number]}  "

## 2024-08-19 ENCOUNTER — OFFICE VISIT (OUTPATIENT)
Dept: RADIATION ONCOLOGY | Facility: CLINIC | Age: 64
End: 2024-08-19
Attending: RADIOLOGY
Payer: MEDICARE

## 2024-08-19 ENCOUNTER — INFUSION THERAPY VISIT (OUTPATIENT)
Dept: ONCOLOGY | Facility: CLINIC | Age: 64
End: 2024-08-19
Attending: NURSE PRACTITIONER
Payer: MEDICARE

## 2024-08-19 ENCOUNTER — APPOINTMENT (OUTPATIENT)
Dept: LAB | Facility: CLINIC | Age: 64
End: 2024-08-19
Attending: OBSTETRICS & GYNECOLOGY
Payer: MEDICARE

## 2024-08-19 VITALS — OXYGEN SATURATION: 90 % | DIASTOLIC BLOOD PRESSURE: 85 MMHG | SYSTOLIC BLOOD PRESSURE: 135 MMHG | HEART RATE: 115 BPM

## 2024-08-19 VITALS — HEIGHT: 67 IN | BODY MASS INDEX: 22.73 KG/M2

## 2024-08-19 VITALS
DIASTOLIC BLOOD PRESSURE: 82 MMHG | TEMPERATURE: 98.5 F | WEIGHT: 146.5 LBS | RESPIRATION RATE: 16 BRPM | SYSTOLIC BLOOD PRESSURE: 128 MMHG | OXYGEN SATURATION: 95 % | BODY MASS INDEX: 22.28 KG/M2 | HEART RATE: 103 BPM

## 2024-08-19 DIAGNOSIS — C51.9 VULVAR CANCER (H): Primary | ICD-10-CM

## 2024-08-19 LAB
ALBUMIN SERPL BCG-MCNC: 4.3 G/DL (ref 3.5–5.2)
ALP SERPL-CCNC: 87 U/L (ref 40–150)
ALT SERPL W P-5'-P-CCNC: 16 U/L (ref 0–50)
ANION GAP SERPL CALCULATED.3IONS-SCNC: 15 MMOL/L (ref 7–15)
AST SERPL W P-5'-P-CCNC: 31 U/L (ref 0–45)
BASOPHILS # BLD AUTO: 0 10E3/UL (ref 0–0.2)
BASOPHILS NFR BLD AUTO: 1 %
BILIRUB SERPL-MCNC: 0.7 MG/DL
BUN SERPL-MCNC: 5.9 MG/DL (ref 8–23)
CALCIUM SERPL-MCNC: 9 MG/DL (ref 8.8–10.4)
CHLORIDE SERPL-SCNC: 101 MMOL/L (ref 98–107)
CREAT SERPL-MCNC: 0.61 MG/DL (ref 0.51–0.95)
EGFRCR SERPLBLD CKD-EPI 2021: >90 ML/MIN/1.73M2
EOSINOPHIL # BLD AUTO: 0.1 10E3/UL (ref 0–0.7)
EOSINOPHIL NFR BLD AUTO: 2 %
ERYTHROCYTE [DISTWIDTH] IN BLOOD BY AUTOMATED COUNT: 12.4 % (ref 10–15)
GLUCOSE SERPL-MCNC: 106 MG/DL (ref 70–99)
HCO3 SERPL-SCNC: 24 MMOL/L (ref 22–29)
HCT VFR BLD AUTO: 45.6 % (ref 35–47)
HGB BLD-MCNC: 15.2 G/DL (ref 11.7–15.7)
IMM GRANULOCYTES # BLD: 0 10E3/UL
IMM GRANULOCYTES NFR BLD: 1 %
LYMPHOCYTES # BLD AUTO: 1.4 10E3/UL (ref 0.8–5.3)
LYMPHOCYTES NFR BLD AUTO: 29 %
MAGNESIUM SERPL-MCNC: 1.6 MG/DL (ref 1.7–2.3)
MCH RBC QN AUTO: 32.6 PG (ref 26.5–33)
MCHC RBC AUTO-ENTMCNC: 33.3 G/DL (ref 31.5–36.5)
MCV RBC AUTO: 98 FL (ref 78–100)
MONOCYTES # BLD AUTO: 0.5 10E3/UL (ref 0–1.3)
MONOCYTES NFR BLD AUTO: 11 %
NEUTROPHILS # BLD AUTO: 2.7 10E3/UL (ref 1.6–8.3)
NEUTROPHILS NFR BLD AUTO: 56 %
NRBC # BLD AUTO: 0 10E3/UL
NRBC BLD AUTO-RTO: 0 /100
PLATELET # BLD AUTO: 214 10E3/UL (ref 150–450)
POTASSIUM SERPL-SCNC: 4 MMOL/L (ref 3.4–5.3)
PROT SERPL-MCNC: 6.9 G/DL (ref 6.4–8.3)
RBC # BLD AUTO: 4.66 10E6/UL (ref 3.8–5.2)
SODIUM SERPL-SCNC: 140 MMOL/L (ref 135–145)
WBC # BLD AUTO: 4.7 10E3/UL (ref 4–11)

## 2024-08-19 PROCEDURE — 96413 CHEMO IV INFUSION 1 HR: CPT

## 2024-08-19 PROCEDURE — 96367 TX/PROPH/DG ADDL SEQ IV INF: CPT

## 2024-08-19 PROCEDURE — 77331 SPECIAL RADIATION DOSIMETRY: CPT | Performed by: RADIOLOGY

## 2024-08-19 PROCEDURE — 258N000003 HC RX IP 258 OP 636: Performed by: OBSTETRICS & GYNECOLOGY

## 2024-08-19 PROCEDURE — 77470 SPECIAL RADIATION TREATMENT: CPT | Mod: XU | Performed by: RADIOLOGY

## 2024-08-19 PROCEDURE — 77470 SPECIAL RADIATION TREATMENT: CPT | Mod: 26 | Performed by: RADIOLOGY

## 2024-08-19 PROCEDURE — 83735 ASSAY OF MAGNESIUM: CPT | Performed by: OBSTETRICS & GYNECOLOGY

## 2024-08-19 PROCEDURE — 96361 HYDRATE IV INFUSION ADD-ON: CPT

## 2024-08-19 PROCEDURE — 96375 TX/PRO/DX INJ NEW DRUG ADDON: CPT

## 2024-08-19 PROCEDURE — 36415 COLL VENOUS BLD VENIPUNCTURE: CPT | Performed by: OBSTETRICS & GYNECOLOGY

## 2024-08-19 PROCEDURE — 77334 RADIATION TREATMENT AID(S): CPT | Performed by: RADIOLOGY

## 2024-08-19 PROCEDURE — 77333 RADIATION TREATMENT AID(S): CPT | Mod: 26 | Performed by: RADIOLOGY

## 2024-08-19 PROCEDURE — 250N000011 HC RX IP 250 OP 636: Performed by: OBSTETRICS & GYNECOLOGY

## 2024-08-19 PROCEDURE — 85041 AUTOMATED RBC COUNT: CPT | Performed by: OBSTETRICS & GYNECOLOGY

## 2024-08-19 PROCEDURE — 77333 RADIATION TREATMENT AID(S): CPT | Mod: XU | Performed by: RADIOLOGY

## 2024-08-19 PROCEDURE — 77386 HC IMRT TREATMENT DELIVERY, COMPLEX: CPT | Performed by: RADIOLOGY

## 2024-08-19 PROCEDURE — 80053 COMPREHEN METABOLIC PANEL: CPT | Performed by: OBSTETRICS & GYNECOLOGY

## 2024-08-19 RX ORDER — PALONOSETRON 0.05 MG/ML
0.25 INJECTION, SOLUTION INTRAVENOUS ONCE
Status: COMPLETED | OUTPATIENT
Start: 2024-08-19 | End: 2024-08-19

## 2024-08-19 RX ORDER — DEXTROSE, SODIUM CHLORIDE, AND POTASSIUM CHLORIDE 5; .45; .15 G/100ML; G/100ML; G/100ML
2000 INJECTION INTRAVENOUS ONCE
Status: COMPLETED | OUTPATIENT
Start: 2024-08-19 | End: 2024-08-19

## 2024-08-19 RX ORDER — MAGNESIUM OXIDE 400 MG/1
400 TABLET ORAL 2 TIMES DAILY
Qty: 4 TABLET | Refills: 0 | Status: SHIPPED | OUTPATIENT
Start: 2024-08-19 | End: 2024-08-21

## 2024-08-19 RX ADMIN — CISPLATIN 70 MG: 1 INJECTION, SOLUTION INTRAVENOUS at 10:18

## 2024-08-19 RX ADMIN — FOSAPREPITANT: 150 INJECTION, POWDER, LYOPHILIZED, FOR SOLUTION INTRAVENOUS at 09:14

## 2024-08-19 RX ADMIN — POTASSIUM CHLORIDE, DEXTROSE MONOHYDRATE AND SODIUM CHLORIDE 2000 ML: 150; 5; 450 INJECTION, SOLUTION INTRAVENOUS at 08:20

## 2024-08-19 RX ADMIN — PALONOSETRON HYDROCHLORIDE 0.25 MG: 0.25 INJECTION INTRAVENOUS at 09:11

## 2024-08-19 ASSESSMENT — PAIN SCALES - GENERAL: PAINLEVEL: NO PAIN (0)

## 2024-08-19 NOTE — PROGRESS NOTES
[facility] PHYSICIANS  SPECIALIZING IN BREAKTHROUGHS  Radiation Oncology    On Treatment Visit Note      Regina Gaspar      Date: 2024   MRN: [id number]   : 1960  Diagnosis: Vulvar cancer      Reason for Visit:  On Radiation Treatment Visit     Treatment Summary to Date  Treatment Site: Pelvis Current Dose: 180/4500 cGy Fractions:       Chemotherapy  Chemo concurrent with radx?: Yes  Oncologist: Kaley  Drug Name/Frequency 1: Ciplatin/weekly    ED Visit/Hospital Admission: None    Treatment Breaks: None      Subjective:   Regina tolerated the first chemo infusion and first radiation well. She is feeling tired from the long day.     Nursing ROS:   Nutrition Alteration  Diet Type: Patient's Preference           Cardiovascular  Respiratory effort: 1 - Normal - without distress  Gastrointestinal  GI Note: WNL  Genitourinary   Note: WNL  Psychosocial  Psychosocial Note: staying at [facility], going well  Pain Assessment  0-10 Pain Scale: 0      Objective:   /85   Pulse 115   LMP  (LMP Unknown)   SpO2 90%   Gen: Appears well, in no acute distress  Skin: No erythema    Labs:  CBC RESULTS:   Recent Labs   Lab Test 24  0738   WBC 4.7   RBC 4.66   HGB 15.2   HCT 45.6   MCV 98   MCH 32.6   MCHC 33.3   RDW 12.4        ELECTROLYTES:  Recent Labs   Lab Test 24  0738      POTASSIUM 4.0   CHLORIDE 101   BRANDON 9.0   CO2 24   BUN 5.9*   CR 0.61   *       Assessment:    Tolerating radiation therapy well.  All questions and concerns addressed.    Toxicities:  Fatigue: Grade 0: No toxicity  Dermatitis: Grade 0: No toxicity  Fatigue not related to radiation therapy.     Plan:   Continue current therapy.    Discussed skin care -- she will apply Cavilon twice daily.       Mosaiq chart and setup information reviewed  MVCT/IGRT images checked    Medication Review  Medication changes: No changes per pt    Educational Topic Discussed  Education Instructions:  reviewed        Fior Rai MD/PhD  [phone] clinic  Pager [phone]    Please do not send letter to referring physician.

## 2024-08-19 NOTE — NURSING NOTE
Chief Complaint   Patient presents with    Blood Draw     Labs drawn via PIV by RN in lab     Labs drawn from PIV placed by RN. Line flushed with saline. Vitals taken. Pt checked in for appointment(s).     Monica SUERO RN PHN BSN  BMT/Oncology Lab

## 2024-08-19 NOTE — LETTER
2024      Regina Gaspar  [address]  [facility] [address]      Dear Colleague,    Thank you for referring your patient, Regina Gaspar, to the [facility] RADIATION ONCOLOGY. Please see a copy of my visit note below.    [facility] PHYSICIANS  SPECIALIZING IN BREAKTHROUGHS  Radiation Oncology    On Treatment Visit Note      Regina Gaspar      Date: 2024   MRN: [id number]   : 1960  Diagnosis: Vulvar cancer      Reason for Visit:  On Radiation Treatment Visit     Treatment Summary to Date  Treatment Site: Pelvis Current Dose: 180/4500 cGy Fractions:       Chemotherapy  Chemo concurrent with radx?: Yes  Oncologist: Kaley  Drug Name/Frequency 1: Ciplatin/weekly    ED Visit/Hospital Admission: None    Treatment Breaks: None      Subjective:   Regina tolerated the first chemo infusion and first radiation well. She is feeling tired from the long day.     Nursing ROS:   Nutrition Alteration  Diet Type: Patient's Preference           Cardiovascular  Respiratory effort: 1 - Normal - without distress  Gastrointestinal  GI Note: WNL  Genitourinary   Note: WNL  Psychosocial  Psychosocial Note: staying at [facility], going well  Pain Assessment  0-10 Pain Scale: 0      Objective:   /85   Pulse 115   LMP  (LMP Unknown)   SpO2 90%   Gen: Appears well, in no acute distress  Skin: No erythema    Labs:  CBC RESULTS:   Recent Labs   Lab Test 24  0738   WBC 4.7   RBC 4.66   HGB 15.2   HCT 45.6   MCV 98   MCH 32.6   MCHC 33.3   RDW 12.4        ELECTROLYTES:  Recent Labs   Lab Test 24  0738      POTASSIUM 4.0   CHLORIDE 101   BRANDON 9.0   CO2 24   BUN 5.9*   CR 0.61   *       Assessment:    Tolerating radiation therapy well.  All questions and concerns addressed.    Toxicities:  Fatigue: Grade 0: No toxicity  Dermatitis: Grade 0: No toxicity  Fatigue not related to radiation therapy.     Plan:   Continue current therapy.    Discussed  skin care -- she will apply Cavilon twice daily.       Mosaiq chart and setup information reviewed  MVCT/IGRT images checked    Medication Review  Medication changes: No changes per pt    Educational Topic Discussed  Education Instructions: reviewed        Fior Rai MD/PhD  [phone] clinic  Pager [phone]    Please do not send letter to referring physician.                       Again, thank you for allowing me to participate in the care of your patient.        Sincerely,        Fior Rai MD

## 2024-08-19 NOTE — PATIENT INSTRUCTIONS
You received an antinausea medication called Aloxi prior to your treatment today which lasts in your body for 72 hours. Aloxi is in the same family as one of your take home medications, Ondanestron (Zofran). Because they are in the same family if you feel nauseated over the next 3 days take Compazine (prochlorperazine). Taking Zofran will not help with nausea in the first three days after treatment because the Aloxi in your system is already working on that pathway.     What are the side effects of chemotherapy?  Side effects depend on which medicines you take, how much you take, and how the medicines affect you. Your doctor can tell you what to expect when you take these medicines. Some of them may cause symptoms such as:  Fatigue.  Nausea.  Vomiting.  A rash.  Hair loss.  Pain or tingling in your hands or the soles of your feet.  Chemo treatment can be hard. It can keep you from doing the things you were doing every day, like going to work or school. But keep in mind that most side effects don't last. They will go away after you finish the treatment.  And many side effects can be managed with medicine. Your doctor will tell you what to do if you have side effects.     Be safe with body fluids  The chemicals in the medicine leave your body through vomit, urine, or stool. The chemicals can stay in your body fluids for several days after your last treatment. So don't let anyone touch any waste from your body. Have caregivers use gloves when washing bed linens. And wash your linens separately from other items, including other linens and clothes.  For the first 3 days after treatment:  Use a different toilet from the rest of the household, if available.  Sit on the toilet seat to prevent splashing.  Put the toilet lid down before you flush.  Always flush twice after using the toilet.  Also, couples should use a condom during sex while a partner is getting chemo treatments and for several days after treatment  ends.    [facility] Triage and after hours / weekends / holidays:  [phone] option #5 then #2    Please call the triage or after hours line if you experience a temperature greater than or equal to 100.4, shaking chills, have uncontrolled nausea, vomiting and/or diarrhea, dizziness, shortness of breath, chest pain, bleeding, unexplained bruising, or if you have any other new/concerning symptoms, questions or concerns.      If you are having any concerning symptoms or wish to speak to a provider before your next infusion visit, please call triage to notify them so we can adequately serve you.     If you need a refill on a narcotic prescription or other medication, please call before your infusion appointment.

## 2024-08-19 NOTE — LETTER
8/19/2024      Regina Gaspar  [address]  [place] [facility] [address]      Dear Colleague,    Thank you for referring your patient, Regina Gaspar, to the [facility] RADIATION ONCOLOGY. Please see a copy of my visit note below.    First treatment completed.     Again, thank you for allowing me to participate in the care of your patient.        Sincerely,        Fior Rai MD

## 2024-08-20 ENCOUNTER — APPOINTMENT (OUTPATIENT)
Dept: RADIATION ONCOLOGY | Facility: CLINIC | Age: 64
End: 2024-08-20
Attending: RADIOLOGY
Payer: MEDICARE

## 2024-08-20 PROCEDURE — 77331 SPECIAL RADIATION DOSIMETRY: CPT | Performed by: RADIOLOGY

## 2024-08-20 PROCEDURE — 77386 HC IMRT TREATMENT DELIVERY, COMPLEX: CPT | Performed by: RADIOLOGY

## 2024-08-21 ENCOUNTER — APPOINTMENT (OUTPATIENT)
Dept: RADIATION ONCOLOGY | Facility: CLINIC | Age: 64
End: 2024-08-21
Attending: RADIOLOGY
Payer: MEDICARE

## 2024-08-21 PROCEDURE — 77386 HC IMRT TREATMENT DELIVERY, COMPLEX: CPT | Performed by: RADIOLOGY

## 2024-08-22 ENCOUNTER — APPOINTMENT (OUTPATIENT)
Dept: RADIATION ONCOLOGY | Facility: CLINIC | Age: 64
End: 2024-08-22
Attending: RADIOLOGY
Payer: MEDICARE

## 2024-08-22 PROCEDURE — 77386 HC IMRT TREATMENT DELIVERY, COMPLEX: CPT | Performed by: RADIOLOGY

## 2024-08-23 ENCOUNTER — APPOINTMENT (OUTPATIENT)
Dept: RADIATION ONCOLOGY | Facility: CLINIC | Age: 64
End: 2024-08-23
Attending: RADIOLOGY
Payer: MEDICARE

## 2024-08-23 PROCEDURE — 77427 RADIATION TX MANAGEMENT X5: CPT | Performed by: RADIOLOGY

## 2024-08-23 PROCEDURE — 77014 PR CT GUIDE FOR PLACEMENT RADIATION THERAPY FIELDS: CPT | Mod: 26 | Performed by: RADIOLOGY

## 2024-08-23 PROCEDURE — 77336 RADIATION PHYSICS CONSULT: CPT | Performed by: RADIOLOGY

## 2024-08-23 PROCEDURE — 77386 HC IMRT TREATMENT DELIVERY, COMPLEX: CPT | Performed by: RADIOLOGY

## 2024-08-26 ENCOUNTER — INFUSION THERAPY VISIT (OUTPATIENT)
Dept: ONCOLOGY | Facility: CLINIC | Age: 64
End: 2024-08-26
Attending: NURSE PRACTITIONER
Payer: MEDICARE

## 2024-08-26 ENCOUNTER — OFFICE VISIT (OUTPATIENT)
Dept: RADIATION ONCOLOGY | Facility: CLINIC | Age: 64
End: 2024-08-26
Attending: RADIOLOGY
Payer: MEDICARE

## 2024-08-26 ENCOUNTER — APPOINTMENT (OUTPATIENT)
Dept: LAB | Facility: CLINIC | Age: 64
End: 2024-08-26
Attending: OBSTETRICS & GYNECOLOGY
Payer: MEDICARE

## 2024-08-26 VITALS
HEART RATE: 117 BPM | DIASTOLIC BLOOD PRESSURE: 79 MMHG | RESPIRATION RATE: 18 BRPM | OXYGEN SATURATION: 96 % | WEIGHT: 144.9 LBS | BODY MASS INDEX: 22.48 KG/M2 | SYSTOLIC BLOOD PRESSURE: 133 MMHG

## 2024-08-26 VITALS — SYSTOLIC BLOOD PRESSURE: 121 MMHG | DIASTOLIC BLOOD PRESSURE: 74 MMHG | OXYGEN SATURATION: 95 % | HEART RATE: 92 BPM

## 2024-08-26 DIAGNOSIS — C51.9 VULVAR CANCER (H): Primary | ICD-10-CM

## 2024-08-26 LAB
ALBUMIN SERPL BCG-MCNC: 4.2 G/DL (ref 3.5–5.2)
ALP SERPL-CCNC: 76 U/L (ref 40–150)
ALT SERPL W P-5'-P-CCNC: 14 U/L (ref 0–50)
ANION GAP SERPL CALCULATED.3IONS-SCNC: 15 MMOL/L (ref 7–15)
AST SERPL W P-5'-P-CCNC: 23 U/L (ref 0–45)
BASOPHILS # BLD AUTO: 0 10E3/UL (ref 0–0.2)
BASOPHILS NFR BLD AUTO: 1 %
BILIRUB SERPL-MCNC: 0.7 MG/DL
BUN SERPL-MCNC: 25.7 MG/DL (ref 8–23)
CALCIUM SERPL-MCNC: 9.3 MG/DL (ref 8.8–10.4)
CHLORIDE SERPL-SCNC: 93 MMOL/L (ref 98–107)
CREAT SERPL-MCNC: 2.28 MG/DL (ref 0.51–0.95)
EGFRCR SERPLBLD CKD-EPI 2021: 23 ML/MIN/1.73M2
EOSINOPHIL # BLD AUTO: 0.1 10E3/UL (ref 0–0.7)
EOSINOPHIL NFR BLD AUTO: 2 %
ERYTHROCYTE [DISTWIDTH] IN BLOOD BY AUTOMATED COUNT: 11.7 % (ref 10–15)
GLUCOSE SERPL-MCNC: 128 MG/DL (ref 70–99)
HCO3 SERPL-SCNC: 26 MMOL/L (ref 22–29)
HCT VFR BLD AUTO: 46.5 % (ref 35–47)
HGB BLD-MCNC: 16.1 G/DL (ref 11.7–15.7)
IMM GRANULOCYTES # BLD: 0 10E3/UL
IMM GRANULOCYTES NFR BLD: 1 %
LYMPHOCYTES # BLD AUTO: 1.4 10E3/UL (ref 0.8–5.3)
LYMPHOCYTES NFR BLD AUTO: 24 %
MAGNESIUM SERPL-MCNC: 1.7 MG/DL (ref 1.7–2.3)
MCH RBC QN AUTO: 33.3 PG (ref 26.5–33)
MCHC RBC AUTO-ENTMCNC: 34.6 G/DL (ref 31.5–36.5)
MCV RBC AUTO: 96 FL (ref 78–100)
MONOCYTES # BLD AUTO: 0.8 10E3/UL (ref 0–1.3)
MONOCYTES NFR BLD AUTO: 14 %
NEUTROPHILS # BLD AUTO: 3.5 10E3/UL (ref 1.6–8.3)
NEUTROPHILS NFR BLD AUTO: 58 %
NRBC # BLD AUTO: 0 10E3/UL
NRBC BLD AUTO-RTO: 0 /100
PLATELET # BLD AUTO: 211 10E3/UL (ref 150–450)
POTASSIUM SERPL-SCNC: 3.7 MMOL/L (ref 3.4–5.3)
PROT SERPL-MCNC: 7 G/DL (ref 6.4–8.3)
RBC # BLD AUTO: 4.84 10E6/UL (ref 3.8–5.2)
SODIUM SERPL-SCNC: 134 MMOL/L (ref 135–145)
WBC # BLD AUTO: 5.8 10E3/UL (ref 4–11)

## 2024-08-26 PROCEDURE — 77014 PR CT GUIDE FOR PLACEMENT RADIATION THERAPY FIELDS: CPT | Mod: 26 | Performed by: RADIOLOGY

## 2024-08-26 PROCEDURE — 36415 COLL VENOUS BLD VENIPUNCTURE: CPT | Performed by: OBSTETRICS & GYNECOLOGY

## 2024-08-26 PROCEDURE — 85004 AUTOMATED DIFF WBC COUNT: CPT | Performed by: OBSTETRICS & GYNECOLOGY

## 2024-08-26 PROCEDURE — 96360 HYDRATION IV INFUSION INIT: CPT

## 2024-08-26 PROCEDURE — 83735 ASSAY OF MAGNESIUM: CPT | Performed by: OBSTETRICS & GYNECOLOGY

## 2024-08-26 PROCEDURE — 80053 COMPREHEN METABOLIC PANEL: CPT | Performed by: OBSTETRICS & GYNECOLOGY

## 2024-08-26 PROCEDURE — 77386 HC IMRT TREATMENT DELIVERY, COMPLEX: CPT | Performed by: RADIOLOGY

## 2024-08-26 PROCEDURE — 258N000003 HC RX IP 258 OP 636: Performed by: OBSTETRICS & GYNECOLOGY

## 2024-08-26 RX ORDER — DEXTROSE, SODIUM CHLORIDE, AND POTASSIUM CHLORIDE 5; .45; .15 G/100ML; G/100ML; G/100ML
2000 INJECTION INTRAVENOUS ONCE
Status: DISCONTINUED | OUTPATIENT
Start: 2024-08-26 | End: 2024-08-26 | Stop reason: HOSPADM

## 2024-08-26 RX ORDER — PALONOSETRON 0.05 MG/ML
0.25 INJECTION, SOLUTION INTRAVENOUS ONCE
Status: DISCONTINUED | OUTPATIENT
Start: 2024-08-26 | End: 2024-08-26 | Stop reason: HOSPADM

## 2024-08-26 RX ADMIN — DEXTROSE AND SODIUM CHLORIDE 1000 ML: 5; 450 INJECTION, SOLUTION INTRAVENOUS at 08:49

## 2024-08-26 ASSESSMENT — PAIN SCALES - GENERAL: PAINLEVEL: NO PAIN (0)

## 2024-08-26 NOTE — PATIENT INSTRUCTIONS
[facility] Triage and after hours / weekends / holidays:  [phone]    Please call the triage or after hours line if you experience a temperature greater than or equal to 100.4, shaking chills, have uncontrolled nausea, vomiting and/or diarrhea, dizziness, shortness of breath, chest pain, bleeding, unexplained bruising, or if you have any other new/concerning symptoms, questions or concerns.      If you are having any concerning symptoms or wish to speak to a provider before your next infusion visit, please call triage to notify them so we can adequately serve you.     If you need a refill on a narcotic prescription or other medication, please call before your infusion appointment.                August 2024 Sunday Monday Tuesday Wednesday Thursday Friday Saturday                       1     2     3       4     5     6     7     8    NEW RADIATION ONCOLOGY   1:30 PM   (90 min.)   Fior Rai MD   [facility] Radiation Oncology    CT SIM   3:00 PM   (60 min.)   Fior Rai MD   [facility] Radiation Oncology    RETURN CCSL   4:45 PM   (45 min.)   Keyanna Davidson APRN [facility] 9     10       11     12     13     14     15    [place] PLANNING BILLING ONLY   7:00 AM   (30 min.)   Fior Rai MD   [facility] Radiation Oncology 16    RETURN CCSL   8:45 AM   (45 min.)   Silvana Marin APRN [facility] 17       18     19    LAB PERIPHERAL   7:00 AM   (15 min.)   [facility] DRAW   [facility]    ONC INFUSION 5 HR (300 MIN)   7:30 AM   (300 min.)    ONC INFUSION NURSE   [facility]    ADVANCED TREATMENT   2:00 PM   (45 min.)   Fior Rai MD   [facility] Radiation Oncology    OTV   2:30 PM   (30 min.)   Fior Rai MD   [facility] Radiation Oncology 20    TREATMENT  10:30 AM   (45 min.)   [facility] RAD ONC VARIAN2    formerly [facility] Radiation Oncology 21    TREATMENT  10:45 AM   (45 min.)   [facility] RAD ONC VARIAN2   formerly [facility] Radiation Oncology 22    TREATMENT  10:30 AM   (45 min.)   [facility] RAD ONC VARIAN2   [facility] Radiation Oncology 23    TREATMENT  10:30 AM   (45 min.)   [facility] RAD ONC VARIAN2   [facility] Radiation Oncology 24       25     26    LAB PERIPHERAL   7:00 AM   (15 min.)   [facility]   [facility]    ONC INFUSION 5 HR (300 MIN)   7:30 AM   (300 min.)   [facility] ONC INFUSION NURSE   [facility]    TREATMENT  10:30 AM   (45 min.)   [facility] RAD ONC VARIAN2   formerly [facility] Radiation Oncology    OTV  11:00 AM   (30 min.)   Fior Rai MD   formerly [facility] Radiation Oncology 27    TREATMENT  10:30 AM   (45 min.)   [facility] RAD ONC VARIAN2   [facility] Radiation Oncology 28    TREATMENT  10:30 AM   (45 min.)   [facility] RAD ONC VARIAN2   [facility] Radiation Oncology 29    TREATMENT  10:30 AM   (45 min.)   [facility] RAD ONC VARIAN2   [facility] Radiation Oncology 30    TREATMENT  10:30 AM   (45 min.)   [facility] RAD ONC VARIAN2   [facility] Radiation Oncology    RETURN CCSL  12:15 PM   (45 min.)   Silvana Marin APRN CNP   [facility] 31 September 2024 Sunday Monday Tuesday Wednesday Thursday Friday Saturday   1     2     3    LAB PERIPHERAL   6:15 AM   (15 min.)   [facility]   [facility]    ONC INFUSION 5 HR (300 MIN)   7:00 AM   (300 min.)   [facility] ONC INFUSION NURSE   [facility]    TREATMENT  10:30 AM   (45 min.)   [facility] RAD ONC VARIAN2   [facility] Radiation Oncology 4    TREATMENT  10:30 AM   (45 min.)   [facility] RAD ONC VARIAN2   [facility] Radiation Oncology    OTV  11:00 AM   (30 min.)   Fior Rai MD   formerly [facility] Radiation Oncology  5    TREATMENT  10:30 AM   (45 min.)   [facility] RAD ONC VARIAN2   [facility] Radiation Oncology 6    TREATMENT  10:30 AM   (45 min.)   [facility] RAD ONC VARIAN2   [facility] Radiation Oncology 7       8     9    LAB PERIPHERAL   6:30 AM   (15 min.)   [facility]   [facility]    ONC INFUSION 5 HR (300 MIN)   7:00 AM   (300 min.)   [facility] ONC INFUSION NURSE   [facility]    TREATMENT  10:30 AM   (45 min.)   [facility] RAD ONC VARIAN2   [facility] Radiation Oncology    OTV  11:00 AM   (30 min.)   Fior Rai MD   [facility] Radiation Oncology 10    TREATMENT  10:30 AM   (45 min.)   [facility] RAD ONC VARIAN2   [facility] Radiation Oncology 11    TREATMENT  10:30 AM   (45 min.)   [facility] RAD ONC VARIAN2   [facility] Radiation Oncology 12    TREATMENT  10:30 AM   (45 min.)   [facility] RAD ONC VARIAN2   [facility] Radiation Oncology 13    TREATMENT  10:30 AM   (45 min.)   [facility] RAD ONC VARIAN2   [facility] Radiation Oncology 14       15     16    LAB PERIPHERAL   6:30 AM   (15 min.)   [facility]   [facility]    ONC INFUSION 5 HR (300 MIN)   7:00 AM   (300 min.)   [facility] ONC INFUSION NURSE   [facility]    TREATMENT  10:30 AM   (45 min.)   [facility] RAD ONC VARIAN2   [facility] Radiation Oncology    OTV  11:00 AM   (30 min.)   Fior Rai MD   [facility] Radiation Oncology 17    TREATMENT  10:30 AM   (45 min.)   [facility] RAD ONC VARIAN2   [facility] Radiation Oncology 18    TREATMENT  10:30 AM   (45 min.)   [facility] RAD ONC VARIAN2   [facility] Radiation Oncology 19    TREATMENT  10:30 AM   (45 min.)   [facility] RAD ONC VARIAN2   [facility] Radiation Oncology 20    TREATMENT  10:30 AM   (45 min.)   [facility] RAD ONC VARIAN2   [facility] Radiation Oncology    RETURN CCSL  12:45 PM   (45 min.)    Silvana Marin APRN CNP   [facility] 21       22     23    LAB PERIPHERAL   6:30 AM   (15 min.)    [facility]   [facility]    ONC INFUSION 5 HR (300 MIN)   7:00 AM   (300 min.)    ONC INFUSION NURSE   [facility]    TREATMENT  10:30 AM   (45 min.)   [facility] RAD ONC VARIAN2   [facility] Radiation Oncology    OTV  11:00 AM   (30 min.)   Fior Rai MD   [facility] Radiation Oncology 24    TREATMENT  10:30 AM   (45 min.)   [facility] RAD ONC VARIAN2   [facility] Radiation Oncology 25    TREATMENT  10:30 AM   (45 min.)   [facility] RAD ONC VARIAN2   [facility] Radiation Oncology 26    TREATMENT  10:30 AM   (45 min.)   [facility] RAD ONC VARIAN2   [facility] Radiation Oncology 27    TREATMENT  10:30 AM   (45 min.)   [facility] RAD ONC VARIAN2   [facility] Radiation Oncology 28       29     30    TREATMENT  10:30 AM   (45 min.)   [facility] RAD ONC VARIAN2   [facility] Radiation Oncology    OTV  11:00 AM   (30 min.)   Fior Rai MD   [facility] Radiation Oncology                                         Recent Results (from the past 24 hour(s))   Comprehensive metabolic panel    Collection Time: 08/26/24  7:32 AM   Result Value Ref Range    Sodium 134 (L) 135 - 145 mmol/L    Potassium 3.7 3.4 - 5.3 mmol/L    Carbon Dioxide (CO2) 26 22 - 29 mmol/L    Anion Gap 15 7 - 15 mmol/L    Urea Nitrogen 25.7 (H) 8.0 - 23.0 mg/dL    Creatinine 2.28 (H) 0.51 - 0.95 mg/dL    GFR Estimate 23 (L) >60 mL/min/1.73m2    Calcium 9.3 8.8 - 10.4 mg/dL    Chloride 93 (L) 98 - 107 mmol/L    Glucose 128 (H) 70 - 99 mg/dL    Alkaline Phosphatase 76 40 - 150 U/L    AST 23 0 - 45 U/L    ALT 14 0 - 50 U/L    Protein Total 7.0 6.4 - 8.3 g/dL    Albumin 4.2 3.5 - 5.2 g/dL    Bilirubin Total 0.7 <=1.2 mg/dL   Magnesium    Collection Time: 08/26/24  7:32 AM   Result Value Ref Range     Magnesium 1.7 1.7 - 2.3 mg/dL   CBC with platelets and differential    Collection Time: 08/26/24  7:32 AM   Result Value Ref Range    WBC Count 5.8 4.0 - 11.0 10e3/uL    RBC Count 4.84 3.80 - 5.20 10e6/uL    Hemoglobin 16.1 (H) 11.7 - 15.7 g/dL    Hematocrit 46.5 35.0 - 47.0 %    MCV 96 78 - 100 fL    MCH 33.3 (H) 26.5 - 33.0 pg    MCHC 34.6 31.5 - 36.5 g/dL    RDW 11.7 10.0 - 15.0 %    Platelet Count 211 150 - 450 10e3/uL    % Neutrophils 58 %    % Lymphocytes 24 %    % Monocytes 14 %    % Eosinophils 2 %    % Basophils 1 %    % Immature Granulocytes 1 %    NRBCs per 100 WBC 0 <1 /100    Absolute Neutrophils 3.5 1.6 - 8.3 10e3/uL    Absolute Lymphocytes 1.4 0.8 - 5.3 10e3/uL    Absolute Monocytes 0.8 0.0 - 1.3 10e3/uL    Absolute Eosinophils 0.1 0.0 - 0.7 10e3/uL    Absolute Basophils 0.0 0.0 - 0.2 10e3/uL    Absolute Immature Granulocytes 0.0 <=0.4 10e3/uL    Absolute NRBCs 0.0 10e3/uL

## 2024-08-26 NOTE — LETTER
2024      Regina Gaspar  [address]  [place] [facility] [place] [facility] [address]      Dear Colleague,    Thank you for referring your patient, Regina Gaspar, to the [facility] RADIATION ONCOLOGY. Please see a copy of my visit note below.    [facility] PHYSICIANS  SPECIALIZING IN BREAKTHROUGHS  Radiation Oncology    On Treatment Visit Note      Regina Gaspar      Date: 2024   MRN: [id number]   : 1960  Diagnosis: Vulvar cancer      Reason for Visit:  On Radiation Treatment Visit     Treatment Summary to Date  Treatment Site: Pelvis Current Dose: 1080/6000 (Boost added) cGy Fractions:  (Boost added)      Chemotherapy  Chemo concurrent with radx?: Yes  Oncologist: Kaley  Drug Name/Frequency 1: Ciplatin/weekly (Chemo held this week r/t poor kidney function, pt encouraged to push fluids, per Med. Onc.)    ED Visit/Hospital Admission: None    Treatment Breaks: None      Subjective:   Regina is tolerating the treatment well. She denies skin irritations. She has no bowel or urinary issues. She unfortunately was not able to have Cisplatin due to elevated Cr of 2.28. The plan is to hydrate her and recheck lab next week. She has been using Aquaphor for skin care as Cavilon was out of stock last week.      Nursing ROS:   Nutrition Alteration  Diet Type: Patient's Preference  Nutrition Note: appetite good  Skin  Skin Note: MD will check skin on table        Cardiovascular  Respiratory effort: 1 - Normal - without distress  Gastrointestinal  GI Note: WNL  Genitourinary   Note: WNL  Psychosocial  Psychosocial Note: staying at [facility], going well  Pain Assessment  0-10 Pain Scale: 0      Objective:   /74   Pulse 92   LMP  (LMP Unknown)   SpO2 95%   Gen: Appears well, in no acute distress  Skin: Mild erythema over the bilateral groin and vulva.     Labs:  CBC RESULTS:   Recent Labs   Lab Test 24  0732   WBC 5.8   RBC 4.84   HGB 16.1*   HCT 46.5   MCV 96   MCH 33.3*    MCHC 34.6   RDW 11.7        ELECTROLYTES:  Recent Labs   Lab Test 08/26/24  0732   *   POTASSIUM 3.7   CHLORIDE 93*   BRANDON 9.3   CO2 26   BUN 25.7*   CR 2.28*   *       Assessment:    Tolerating radiation therapy well.  All questions and concerns addressed.    Toxicities:  Fatigue: Grade 1: Fatigue relieved by rest  Pain: Grade 0: No toxicity  Diarrhea: Grade 0: No toxicity  Urinary frequency: Grade 0: No toxicity  Urinary tract pain: Grade 0: No toxicity  Urinary urgency: Grade 0: No toxicity  Dermatitis: Grade 1: Faint erythema or dry desquamation    Plan:   Continue current therapy.    Dermatitis: she will apply Cavilon       Mosaiq chart and setup information reviewed  MVCT/IGRT images checked    Medication Review  Medication changes: No changes per pt    Educational Topic Discussed  Education Instructions: reviewed        Fior Rai MD/PhD  [phone] clinic  Pager [phone]    Please do not send letter to referring physician.         Again, thank you for allowing me to participate in the care of your patient.        Sincerely,        Fior Rai MD

## 2024-08-26 NOTE — PROGRESS NOTES
[facility] PHYSICIANS  SPECIALIZING IN BREAKTHROUGHS  Radiation Oncology    On Treatment Visit Note      Regina Gaspar      Date: 2024   MRN: [id number]   : 1960  Diagnosis: Vulvar cancer      Reason for Visit:  On Radiation Treatment Visit     Treatment Summary to Date  Treatment Site: Pelvis Current Dose: 1080/6000 (Boost added) cGy Fractions:  (Boost added)      Chemotherapy  Chemo concurrent with radx?: Yes  Oncologist: Kaley  Drug Name/Frequency 1: Ciplatin/weekly (Chemo held this week r/t poor kidney function, pt encouraged to push fluids, per Med. Onc.)    ED Visit/Hospital Admission: None    Treatment Breaks: None      Subjective:   Regina is tolerating the treatment well. She denies skin irritations. She has no bowel or urinary issues. She unfortunately was not able to have Cisplatin due to elevated Cr of 2.28. The plan is to hydrate her and recheck lab next week. She has been using Aquaphor for skin care as Cavilon was out of stock last week.      Nursing ROS:   Nutrition Alteration  Diet Type: Patient's Preference  Nutrition Note: appetite good  Skin  Skin Note: MD will check skin on table        Cardiovascular  Respiratory effort: 1 - Normal - without distress  Gastrointestinal  GI Note: WNL  Genitourinary   Note: WNL  Psychosocial  Psychosocial Note: staying at [facility], going well  Pain Assessment  0-10 Pain Scale: 0      Objective:   /74   Pulse 92   LMP  (LMP Unknown)   SpO2 95%   Gen: Appears well, in no acute distress  Skin: Mild erythema over the bilateral groin and vulva.     Labs:  CBC RESULTS:   Recent Labs   Lab Test 24  0732   WBC 5.8   RBC 4.84   HGB 16.1*   HCT 46.5   MCV 96   MCH 33.3*   MCHC 34.6   RDW 11.7        ELECTROLYTES:  Recent Labs   Lab Test 24  0732   *   POTASSIUM 3.7   CHLORIDE 93*   BRANDON 9.3   CO2 26   BUN 25.7*   CR 2.28*   *       Assessment:    Tolerating radiation therapy well.  All questions  and concerns addressed.    Toxicities:  Fatigue: Grade 1: Fatigue relieved by rest  Pain: Grade 0: No toxicity  Diarrhea: Grade 0: No toxicity  Urinary frequency: Grade 0: No toxicity  Urinary tract pain: Grade 0: No toxicity  Urinary urgency: Grade 0: No toxicity  Dermatitis: Grade 1: Faint erythema or dry desquamation    Plan:   Continue current therapy.    Dermatitis: she will apply Cavilon       Mosaiq chart and setup information reviewed  MVCT/IGRT images checked    Medication Review  Medication changes: No changes per pt    Educational Topic Discussed  Education Instructions: reviewed        Fior Rai MD/PhD  [phone] clinic  Pager [phone]    Please do not send letter to referring physician.

## 2024-08-26 NOTE — PROGRESS NOTES
Infusion Nursing Note:  Regina Gaspar presents today for Cycle 1 Day 8 Cisplatin (DEFFERED).    Patient seen by provider today: No   present during visit today: Not Applicable.    Note: Pt presents to infusion today feeling well. Pt denies having any fevers/chills, chest pain, sob, nausea/vomiting, rash, bladder or bowel concerns.    Pt reports that she did not take dexamethasone as prescribed this past week. She thought it was PRN for nausea. Pt re-educated about how to take dexamethasone for next week since she did not get chemo today she does not need to take it this week.    NEW [facility]/Rosalia Ashley, RN on 8/26 @ 800:  - HOLD cisplatin today due to elevated creatinine.   - Defer to 9/3.  - Give 1L D5 1/2 NS over 1 hr today    Intravenous Access:  Peripheral IV placed.    Treatment Conditions:  Lab Results   Component Value Date    HGB 16.1 (H) 08/26/2024    WBC 5.8 08/26/2024    ANEUTAUTO 3.5 08/26/2024     08/26/2024        Lab Results   Component Value Date     (L) 08/26/2024    POTASSIUM 3.7 08/26/2024    MAG 1.7 08/26/2024    CR 2.28 (H) 08/26/2024    BRANDON 9.3 08/26/2024    BILITOTAL 0.7 08/26/2024    ALBUMIN 4.2 08/26/2024    ALT 14 08/26/2024    AST 23 08/26/2024     Results reviewed, labs did NOT meet treatment parameters: Scr </= 1.5: 2.28 today.      Post Infusion Assessment:  Patient tolerated infusion without incident.  Blood return noted pre and post infusion.  Site patent and intact, free from redness, edema or discomfort.  No evidence of extravasations.  Access discontinued per protocol.       Discharge Plan:   Patient declined prescription refills.  Discharge instructions reviewed with: Patient.  Patient and/or family verbalized understanding of discharge instructions and all questions answered.  AVS to patient via [id number].  Patient will return 9/3 for next appointment.   Patient discharged in stable condition accompanied by: self.  Departure Mode:  Ambulatory.      Rosalia Ashley RN

## 2024-08-26 NOTE — NURSING NOTE
[facility] Internal Medicine  [address] [place] [address]  Phone: [phone]  Fax: [phone]                  Deirdre Yanez   2017 2:00 PM   Office Visit    Description:  Female : 1950   Provider:  Beti Blackburn MD   Department:  [facility] - Internal Medicine           Reason for Visit     Follow-up           Diagnoses this Visit        Comments    Pure hypercholesterolemia    -  Primary     Essential hypertension         Type 2 diabetes mellitus with complication, without long-term current use of insulin         Left hand pain                To Do List           Future Appointments        Provider Department Dept Phone    2017 2:30 PM [facility] XR2 [facility] [phone]    2017 9:00 AM GASTRO NURSE, [facility] Gastroenter[facility] [phone]    2017 1:00 PM MINA Abreu [facility] Internal Medicine [phone]    2017 10:10 AM LABORATORY, TEOFILO BEAN [facility] [phone]    2017 8:30 AM Darlene Cedillo MD [facility] Dermatology [phone]      Goals (5 Years of Data)     None      Follow-Up and Disposition     Return in about 2 weeks (around 2017).      [facility]     [facility] Nurse Care Line - 24/ Assistance  Registered nurses in the [facility] provide clinical advisement, health education, appointment booking, and other advisory services.  Call for this free service at [phone].             Medications           Message regarding Medications     Verify the changes and/or additions to your medication regime listed below are the same as discussed with your clinician today.  If any of these changes or additions are incorrect, please notify your healthcare provider.             Verify that the below list of medications is an accurate representation of the medications you are currently taking.  If none reported, the list may be blank. If incorrect, please contact your healthcare provider.  Chief Complaint   Patient presents with    Blood Draw     Labs drawn with PIV start by RN.     Labs drawn with PIV start by RN. Pt tolerated well. Vitals taken. Pt checked into next appointment.    Kat Lubin RN   "Carry this list with you in case of emergency.           Current Medications     b complex vitamins tablet Take 1 tablet by mouth once daily.    clobetasol (TEMOVATE) 0.05 % external solution Use on scalp twice daily    fluticasone (FLONASE) 50 mcg/actuation nasal spray USE 2 SPRAYS IN EACH NOSTRIL ONCE DAILY    metformin (GLUCOPHAGE-XR) 500 MG 24 hr tablet Take 1-2 tablets (500-1,000 mg total) by mouth daily with breakfast.    minoxidil (ROGAINE) 2 % external solution Apply topically 2 (two) times daily.    nebivolol (BYSTOLIC) 10 MG Tab Take 1 tablet (10 mg total) by mouth once daily.           Clinical Reference Information           Vital Signs - Last Recorded  Most recent update: 1/6/2017  1:50 PM by Brad Shetty MA    BP Pulse Temp Ht Wt SpO2    (!) 182/94 (BP Location: Right arm) 66 97.3 °F (36.3 °C) (Tympanic) 5' 9.6" (1.768 m) 103 kg (227 lb 1.2 oz) 98%    BMI                32.96 kg/m2          Blood Pressure          Most Recent Value    BP  (!)  182/94      Allergies as of 1/6/2017     Arb-angiotensin Receptor Antagonist    Metoprolol    Ace Inhibitors    Sulfa (Sulfonamide Antibiotics)      Immunizations Administered on Date of Encounter - 1/6/2017     None      Orders Placed During Today's Visit     Future Labs/Procedures Expected by Expires    X-Ray Hand 3 view Left  1/6/2017 4/6/2017      "

## 2024-08-27 ENCOUNTER — APPOINTMENT (OUTPATIENT)
Dept: RADIATION ONCOLOGY | Facility: CLINIC | Age: 64
End: 2024-08-27
Attending: RADIOLOGY
Payer: MEDICARE

## 2024-08-27 PROCEDURE — 77014 PR CT GUIDE FOR PLACEMENT RADIATION THERAPY FIELDS: CPT | Mod: 26 | Performed by: RADIOLOGY

## 2024-08-27 PROCEDURE — 77386 HC IMRT TREATMENT DELIVERY, COMPLEX: CPT | Performed by: RADIOLOGY

## 2024-08-28 ENCOUNTER — APPOINTMENT (OUTPATIENT)
Dept: RADIATION ONCOLOGY | Facility: CLINIC | Age: 64
End: 2024-08-28
Attending: RADIOLOGY
Payer: MEDICARE

## 2024-08-28 PROCEDURE — 77014 PR CT GUIDE FOR PLACEMENT RADIATION THERAPY FIELDS: CPT | Mod: 26 | Performed by: RADIOLOGY

## 2024-08-28 PROCEDURE — 77386 HC IMRT TREATMENT DELIVERY, COMPLEX: CPT | Performed by: RADIOLOGY

## 2024-08-28 NOTE — PROGRESS NOTES
Virtual Visit Details    Type of service:  Telephone Visit   Phone call duration: 8 minutes   Originating Location (pt. 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  She presents for follow up and disease management by phone.     Oncology History:  3/2024: Initially, she was seen in the ED visit 3/2024 with a vulvar lesion and some early satiety.      3/31/24 CT:  IMPRESSION:  1.  2.3 x 2.3 cm low-density lymph node or possible small fluid collection noted within the right groin. Could consider ultrasound and/or biopsy as clinically indicated.   2.  No other evidence of malignancy within the chest, abdomen, and pelvis.     24: PAP NILM, HPV 16+. Vulva, right, biopsy-  Well differentiated squamous cell carcinoma with keratinization, superficially invasive, incompletely excised     24 Radical local excision, bilateral inguinal and right pelvic LND  A. Vulva. 12 o'clock margin, biopsy:  - Squamous epithelium with reactive changes  - Negative for dysplasia or malignancy  B. Vulva, 3 o'clock margin, biopsy:   - Squamous epithelium with reactive changes  - Negative for dysplasia or malignancy  C. Vulva, 6 o'clock margin, biopsy:  - Squamous epithelium with reactive changes  - Negative for dysplasia or malignancy  D. Lymph node, right inguinal femoral, lymphadenectomy:  - METASTATIC SQUAMOUS CELL CARCINOMA in three of six lymph nodes (3/6)  - Largest metastatic deposit measures 3.6 cm  - No extra capsular extension identified  E. Lymph node, left inguinal femoral, lymphadenectomy:  - Two reactive lymph nodes examined, negative for malignancy (0/2)  F. Vulva, right,  "radical excision:  - INVASIVE KERATINIZING SQUAMOUS CELL CARCINOMA, moderately differentiated, HPV associated  - Size: 4.2 cm  - Depth of invasion 5 mm  - Surgical margins are negative for high grade dysplasia or carcinoma   G. Endocervix, curetting:  - Predominantly mucus with rare fragments of ectocervical and endocervical epithelium with reactive changes  - Negative for dysplasia or malignancy  H. Cervix, random, biopsies:  - Ectocervical and endocervical junctional tissue with reactive epithelial changes  - Negative for dysplasia or malignancy   I. Lymph node, right pelvic, lymphadenectomy:  - Four reactive lymph nodes examined, negative for malignancy (0/4)     Plan: Chemoradiation with weekly cisplatin 40 mg/m2     24: Cycle 1 day 1 cisplatin.    24: Cycle 1 day 8 cisplatin Creatinine 2.28.  Cisplatin held and given 1L D5 1/ NS.  9/3/24: Cycle 1 day 15 cisplatin planned.            Today she reports treatment this week went \"very well\".     -Vaginal bleeding/discharge: No  -Pain: No  -Nausea or vomiting: No, she did not use any antiemetics or any dexamethasone   -Appetite: Large appetite  -Weight loss: No, weight is up and down but stable  -Diarrhea/Constipation: Normal bowel function  -Neuropathy symptoms: No  -Tinnitus:No  -Leg swelling: No  -Fevers: No  -Chest pain: No  -SOB: No  -Hydration: Drinking plenty of fluids. Body armour 12 oz at least 2 and ensure 8 oz x 2 + 16 oz x 4 water               Past Medical History:    Past Medical History:   Diagnosis Date    Vulvar cancer (H) 2024         Past Surgical History:    Past Surgical History:   Procedure Laterality Date     SECTION      LAPAROSCOPIC LYMPHADENECTOMY N/A 2024    Procedure: Laparoscopic lymphadenectomy;  Surgeon: Javier Roche MD;  Location: [facility] OR    VULVECTOMY RADICAL DISSECT GROIN(S) N/A 2024    Procedure: Radical excision of right vulvar lesion. Removal of lymph nodes from left and right groin. " Biopsies of cervix.;  Surgeon: Javier Roche MD;  Location:  OR         Health Maintenance Due   Topic Date Due    DEXA  Never done    ADVANCE CARE PLANNING  Never done    DEPRESSION ACTION PLAN  Never done    MAMMO SCREENING  Never done    Pneumococcal Vaccine: 65+ Years (1 of 2 - PCV) Never done    COLORECTAL CANCER SCREENING  Never done    HIV SCREENING  Never done    HEPATITIS C SCREENING  Never done    ZOSTER IMMUNIZATION (1 of 2) Never done    LIPID  Never done    RSV VACCINE (1 - 1-dose 60+ series) Never done    COVID-19 Vaccine (3 - Pfizer risk series) 11/22/2021    MEDICARE ANNUAL WELLNESS VISIT  Never done    COLPOSCOPY  Never done       Current Medications:     Current Outpatient Medications   Medication Sig Dispense Refill    dexAMETHasone (DECADRON) 4 MG tablet Take 2 tablets (8 mg) by mouth daily Take for 3 days, starting the day after chemo on day 2 and 9. Take with food. If no nausea and vomiting with first cisplatin doses, may stop dexamethasone. 12 tablet 2    ondansetron (ZOFRAN) 8 MG tablet Take 1 tablet (8 mg) by mouth every 8 hours as needed for nausea (vomiting) Do not take for 3 days after chemo 30 tablet 2    prochlorperazine (COMPAZINE) 5 MG tablet Take 1 tablet (5 mg) by mouth every 6 hours as needed for nausea or vomiting 30 tablet 2    traZODone (DESYREL) 100 MG tablet Take 1-2 tablets (100-200 mg) by mouth at bedtime 180 tablet 1         Allergies:      No Known Allergies     Social History:     Social History     Tobacco Use    Smoking status: Former     Types: Cigarettes     Passive exposure: Past    Smokeless tobacco: Never    Tobacco comments:     50 pack years. 8/8/24, has not had a cigarette in a couple weeks   Substance Use Topics    Alcohol use: Yes     Comment: quart vodka/ week       History   Drug Use Unknown         Family History:     The patient's family history is notable for:    Family History   Problem Relation Age of Onset    Breast Cancer Mother          "has had a couple times    Colon Cancer Father 80    Uterine Cancer No family hx of          Physical Exam:     Ht 1.702 m (5' 7\")   Wt 65.3 kg (144 lb)   LMP  (LMP Unknown)   BMI 22.55 kg/m    Body mass index is 22.55 kg/m .    Respiratory: No audible wheeze, cough.      Psychiatric: appropriate mood and affect. Mentation appears normal, affect normal/bright, judgement and insight intact, normal speech       The rest of a comprehensive physical examination is deferred due phone visit restrictions.      Recent Results (from the past 168 hour(s))   Comprehensive metabolic panel   Result Value Ref Range    Sodium 134 (L) 135 - 145 mmol/L    Potassium 3.7 3.4 - 5.3 mmol/L    Carbon Dioxide (CO2) 26 22 - 29 mmol/L    Anion Gap 15 7 - 15 mmol/L    Urea Nitrogen 25.7 (H) 8.0 - 23.0 mg/dL    Creatinine 2.28 (H) 0.51 - 0.95 mg/dL    GFR Estimate 23 (L) >60 mL/min/1.73m2    Calcium 9.3 8.8 - 10.4 mg/dL    Chloride 93 (L) 98 - 107 mmol/L    Glucose 128 (H) 70 - 99 mg/dL    Alkaline Phosphatase 76 40 - 150 U/L    AST 23 0 - 45 U/L    ALT 14 0 - 50 U/L    Protein Total 7.0 6.4 - 8.3 g/dL    Albumin 4.2 3.5 - 5.2 g/dL    Bilirubin Total 0.7 <=1.2 mg/dL   Magnesium   Result Value Ref Range    Magnesium 1.7 1.7 - 2.3 mg/dL   CBC with platelets and differential   Result Value Ref Range    WBC Count 5.8 4.0 - 11.0 10e3/uL    RBC Count 4.84 3.80 - 5.20 10e6/uL    Hemoglobin 16.1 (H) 11.7 - 15.7 g/dL    Hematocrit 46.5 35.0 - 47.0 %    MCV 96 78 - 100 fL    MCH 33.3 (H) 26.5 - 33.0 pg    MCHC 34.6 31.5 - 36.5 g/dL    RDW 11.7 10.0 - 15.0 %    Platelet Count 211 150 - 450 10e3/uL    % Neutrophils 58 %    % Lymphocytes 24 %    % Monocytes 14 %    % Eosinophils 2 %    % Basophils 1 %    % Immature Granulocytes 1 %    NRBCs per 100 WBC 0 <1 /100    Absolute Neutrophils 3.5 1.6 - 8.3 10e3/uL    Absolute Lymphocytes 1.4 0.8 - 5.3 10e3/uL    Absolute Monocytes 0.8 0.0 - 1.3 10e3/uL    Absolute Eosinophils 0.1 0.0 - 0.7 10e3/uL    Absolute " Basophils 0.0 0.0 - 0.2 10e3/uL    Absolute Immature Granulocytes 0.0 <=0.4 10e3/uL    Absolute NRBCs 0.0 10e3/uL           Assessment:    Regina Gaspar is a 64 year old woman  with a diagnosis of 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  She presents for follow up and disease management by phone.    8 minutes spent on the date of the encounter doing chart review, history and exam, documentation, and further activities as noted above.      Plan:     1.) Vulvar cancer:  OK to proceed with planned treatment as scheduled if labs are WDL.  RTC in 2 week for a symptom assessment visit; message sent for scheduling. Reviewed signs and symptoms for when she should contact the clinic or seek additional care.  Patient to contact the clinic with any questions or concerns in the interim.      Genetic risk factors were assessed and she does not meet qualification for referral.    Labs and/or tests reviewed include:  CBC. CMP. Mag. Renal US.     2.) Health maintenance:  Issues addressed today include following up with PCP for annual health maintenance and non-gynecologic issues.     3.) Elevated creatinine: Creatinine Monday 2.28.  She did not receive cisplatin this week but did get 1L fluids.  Recheck Tues.  Discussed that due to the steep increase in her creatinine I would recommend a renal US to make sure her kidneys are working appropriately.  Orders placed and message sent to scheduling; they will contact her to schedule.      Silvana Marin, DNP, APRN, FNP-C, AOCNP  Oncology Nurse Practitioner  Division of Gynecologic Oncology  Pager: [phone]     CC  Patient Care Team:  Brooke Li PA-C as PCP - General (Family Medicine)  Carolina Hernandez DO as Physician (OB/Gyn)  Javier Roche MD as MD (Gynecologic Oncology)  Carolina Hernandez DO as Assigned OBGYN Provider  Daquan Cramer, RN as Specialty Care Coordinator (Hematology & Oncology)  Caio Jimenez MD as Physician  (Radiation Oncology)  Everton Jimenez MD as MD (Radiation Oncology)  Brooke Li PA-C as Assigned PCP  Fior Rai MD as Assigned Cancer Care Provider  SELF, REFERRED

## 2024-08-29 ENCOUNTER — APPOINTMENT (OUTPATIENT)
Dept: RADIATION ONCOLOGY | Facility: CLINIC | Age: 64
End: 2024-08-29
Attending: RADIOLOGY
Payer: MEDICARE

## 2024-08-29 PROCEDURE — 77386 HC IMRT TREATMENT DELIVERY, COMPLEX: CPT | Performed by: RADIOLOGY

## 2024-08-29 PROCEDURE — 77014 PR CT GUIDE FOR PLACEMENT RADIATION THERAPY FIELDS: CPT | Mod: 26 | Performed by: RADIOLOGY

## 2024-08-29 PROCEDURE — 77336 RADIATION PHYSICS CONSULT: CPT | Performed by: RADIOLOGY

## 2024-08-30 ENCOUNTER — APPOINTMENT (OUTPATIENT)
Dept: RADIATION ONCOLOGY | Facility: CLINIC | Age: 64
End: 2024-08-30
Attending: RADIOLOGY
Payer: MEDICARE

## 2024-08-30 ENCOUNTER — VIRTUAL VISIT (OUTPATIENT)
Dept: ONCOLOGY | Facility: CLINIC | Age: 64
End: 2024-08-30
Attending: NURSE PRACTITIONER
Payer: MEDICARE

## 2024-08-30 VITALS — WEIGHT: 144 LBS | BODY MASS INDEX: 22.6 KG/M2 | HEIGHT: 67 IN

## 2024-08-30 DIAGNOSIS — C51.9 VULVAR CANCER (H): Primary | ICD-10-CM

## 2024-08-30 DIAGNOSIS — R79.89 ELEVATED SERUM CREATININE: ICD-10-CM

## 2024-08-30 PROCEDURE — 77014 PR CT GUIDE FOR PLACEMENT RADIATION THERAPY FIELDS: CPT | Mod: 26 | Performed by: RADIOLOGY

## 2024-08-30 PROCEDURE — 99441 PR PHYSICIAN TELEPHONE EVALUATION 5-10 MIN: CPT | Mod: 93 | Performed by: NURSE PRACTITIONER

## 2024-08-30 PROCEDURE — 77427 RADIATION TX MANAGEMENT X5: CPT | Performed by: RADIOLOGY

## 2024-08-30 PROCEDURE — 77386 HC IMRT TREATMENT DELIVERY, COMPLEX: CPT | Performed by: RADIOLOGY

## 2024-08-30 ASSESSMENT — PAIN SCALES - GENERAL: PAINLEVEL: NO PAIN (0)

## 2024-08-30 NOTE — LETTER
2024      Regina Gaspar  [address]  [facility] [address]      Dear Colleague,    Thank you for referring your patient, Regina Gaspar, to the [facility]. Please see a copy of my visit note below.    Virtual Visit Details    Type of service:  Telephone Visit   Phone call duration: 8 minutes   Originating Location (pt. 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  She presents for follow up and disease management by phone.     Oncology History:  3/2024: Initially, she was seen in the ED visit 3/2024 with a vulvar lesion and some early satiety.      3/31/24 CT:  IMPRESSION:  1.  2.3 x 2.3 cm low-density lymph node or possible small fluid collection noted within the right groin. Could consider ultrasound and/or biopsy as clinically indicated.   2.  No other evidence of malignancy within the chest, abdomen, and pelvis.     24: PAP NILM, HPV 16+. Vulva, right, biopsy-  Well differentiated squamous cell carcinoma with keratinization, superficially invasive, incompletely excised     24 Radical local excision, bilateral inguinal and right pelvic LND  A. Vulva. 12 o'clock margin, biopsy:  - Squamous epithelium with reactive changes  - Negative for dysplasia or malignancy  B. Vulva, 3 o'clock margin, biopsy:   - Squamous epithelium with reactive changes  - Negative for dysplasia or malignancy  C. Vulva, 6 o'clock margin, biopsy:  - Squamous epithelium with reactive changes  - Negative for dysplasia or malignancy  D. Lymph node, right inguinal femoral, lymphadenectomy:  - METASTATIC SQUAMOUS CELL CARCINOMA in three of six lymph  "nodes (3/6)  - Largest metastatic deposit measures 3.6 cm  - No extra capsular extension identified  E. Lymph node, left inguinal femoral, lymphadenectomy:  - Two reactive lymph nodes examined, negative for malignancy (0/2)  F. Vulva, right, radical excision:  - INVASIVE KERATINIZING SQUAMOUS CELL CARCINOMA, moderately differentiated, HPV associated  - Size: 4.2 cm  - Depth of invasion 5 mm  - Surgical margins are negative for high grade dysplasia or carcinoma   G. Endocervix, curetting:  - Predominantly mucus with rare fragments of ectocervical and endocervical epithelium with reactive changes  - Negative for dysplasia or malignancy  H. Cervix, random, biopsies:  - Ectocervical and endocervical junctional tissue with reactive epithelial changes  - Negative for dysplasia or malignancy   I. Lymph node, right pelvic, lymphadenectomy:  - Four reactive lymph nodes examined, negative for malignancy (0/4)     Plan: Chemoradiation with weekly cisplatin 40 mg/m2     24: Cycle 1 day 1 cisplatin.  Creatinine 2.28.  Cisplatin held and given 1L D5 /2 NS.  9/3/24: Cycle 1 day 8 cisplatin planned.            Today she reports treatment this week went \"very well\".     -Vaginal bleeding/discharge: No  -Pain: No  -Nausea or vomiting: No, she did not use any antiemetics or any dexamethasone   -Appetite: Large appetite  -Weight loss: No, weight is up and down but stable  -Diarrhea/Constipation: Normal bowel function  -Neuropathy symptoms: No  -Tinnitus:No  -Leg swelling: No  -Fevers: No  -Chest pain: No  -SOB: No              Past Medical History:    Past Medical History:   Diagnosis Date     Vulvar cancer (H) 2024         Past Surgical History:    Past Surgical History:   Procedure Laterality Date      SECTION       LAPAROSCOPIC LYMPHADENECTOMY N/A 2024    Procedure: Laparoscopic lymphadenectomy;  Surgeon: Javier Roche MD;  Location: [facility] OR     VULVECTOMY RADICAL DISSECT GROIN(S) N/A 2024    " Procedure: Radical excision of right vulvar lesion. Removal of lymph nodes from left and right groin. Biopsies of cervix.;  Surgeon: Javier Roche MD;  Location:  OR         Health Maintenance Due   Topic Date Due     DEXA  Never done     ADVANCE CARE PLANNING  Never done     DEPRESSION ACTION PLAN  Never done     MAMMO SCREENING  Never done     Pneumococcal Vaccine: 65+ Years (1 of 2 - PCV) Never done     COLORECTAL CANCER SCREENING  Never done     HIV SCREENING  Never done     HEPATITIS C SCREENING  Never done     ZOSTER IMMUNIZATION (1 of 2) Never done     LIPID  Never done     RSV VACCINE (1 - 1-dose 60+ series) Never done     COVID-19 Vaccine (3 - Pfizer risk series) 11/22/2021     MEDICARE ANNUAL WELLNESS VISIT  Never done     COLPOSCOPY  Never done       Current Medications:     Current Outpatient Medications   Medication Sig Dispense Refill     dexAMETHasone (DECADRON) 4 MG tablet Take 2 tablets (8 mg) by mouth daily Take for 3 days, starting the day after chemo on day 2 and 9. Take with food. If no nausea and vomiting with first cisplatin doses, may stop dexamethasone. 12 tablet 2     ondansetron (ZOFRAN) 8 MG tablet Take 1 tablet (8 mg) by mouth every 8 hours as needed for nausea (vomiting) Do not take for 3 days after chemo 30 tablet 2     prochlorperazine (COMPAZINE) 5 MG tablet Take 1 tablet (5 mg) by mouth every 6 hours as needed for nausea or vomiting 30 tablet 2     traZODone (DESYREL) 100 MG tablet Take 1-2 tablets (100-200 mg) by mouth at bedtime 180 tablet 1         Allergies:      No Known Allergies     Social History:     Social History     Tobacco Use     Smoking status: Former     Types: Cigarettes     Passive exposure: Past     Smokeless tobacco: Never     Tobacco comments:     50 pack years. 8/8/24, has not had a cigarette in a couple weeks   Substance Use Topics     Alcohol use: Yes     Comment: quart vodka/ week       History   Drug Use Unknown         Family History:     The  "patient's family history is notable for:    Family History   Problem Relation Age of Onset     Breast Cancer Mother         has had a couple times     Colon Cancer Father 80     Uterine Cancer No family hx of          Physical Exam:     Ht 1.702 m (5' 7\")   Wt 65.3 kg (144 lb)   LMP  (LMP Unknown)   BMI 22.55 kg/m    Body mass index is 22.55 kg/m .    Respiratory: No audible wheeze, cough.      Psychiatric: appropriate mood and affect. Mentation appears normal, affect normal/bright, judgement and insight intact, normal speech       The rest of a comprehensive physical examination is deferred due phone visit restrictions.      Recent Results (from the past 168 hour(s))   Comprehensive metabolic panel   Result Value Ref Range    Sodium 134 (L) 135 - 145 mmol/L    Potassium 3.7 3.4 - 5.3 mmol/L    Carbon Dioxide (CO2) 26 22 - 29 mmol/L    Anion Gap 15 7 - 15 mmol/L    Urea Nitrogen 25.7 (H) 8.0 - 23.0 mg/dL    Creatinine 2.28 (H) 0.51 - 0.95 mg/dL    GFR Estimate 23 (L) >60 mL/min/1.73m2    Calcium 9.3 8.8 - 10.4 mg/dL    Chloride 93 (L) 98 - 107 mmol/L    Glucose 128 (H) 70 - 99 mg/dL    Alkaline Phosphatase 76 40 - 150 U/L    AST 23 0 - 45 U/L    ALT 14 0 - 50 U/L    Protein Total 7.0 6.4 - 8.3 g/dL    Albumin 4.2 3.5 - 5.2 g/dL    Bilirubin Total 0.7 <=1.2 mg/dL   Magnesium   Result Value Ref Range    Magnesium 1.7 1.7 - 2.3 mg/dL   CBC with platelets and differential   Result Value Ref Range    WBC Count 5.8 4.0 - 11.0 10e3/uL    RBC Count 4.84 3.80 - 5.20 10e6/uL    Hemoglobin 16.1 (H) 11.7 - 15.7 g/dL    Hematocrit 46.5 35.0 - 47.0 %    MCV 96 78 - 100 fL    MCH 33.3 (H) 26.5 - 33.0 pg    MCHC 34.6 31.5 - 36.5 g/dL    RDW 11.7 10.0 - 15.0 %    Platelet Count 211 150 - 450 10e3/uL    % Neutrophils 58 %    % Lymphocytes 24 %    % Monocytes 14 %    % Eosinophils 2 %    % Basophils 1 %    % Immature Granulocytes 1 %    NRBCs per 100 WBC 0 <1 /100    Absolute Neutrophils 3.5 1.6 - 8.3 10e3/uL    Absolute " Lymphocytes 1.4 0.8 - 5.3 10e3/uL    Absolute Monocytes 0.8 0.0 - 1.3 10e3/uL    Absolute Eosinophils 0.1 0.0 - 0.7 10e3/uL    Absolute Basophils 0.0 0.0 - 0.2 10e3/uL    Absolute Immature Granulocytes 0.0 <=0.4 10e3/uL    Absolute NRBCs 0.0 10e3/uL           Assessment:    Regina Gaspar is a 64 year old woman  with a diagnosis of 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  She presents for follow up and disease management by phone.    8 minutes spent on the date of the encounter doing chart review, history and exam, documentation, and further activities as noted above.      Plan:     1.) Vulvar cancer:  OK to proceed with planned treatment as scheduled if labs are WDL.  RTC in 2 week for a symptom assessment visit; message sent for scheduling. Reviewed signs and symptoms for when she should contact the clinic or seek additional care.  Patient to contact the clinic with any questions or concerns in the interim.      Genetic risk factors were assessed and she does not meet qualification for referral.    Labs and/or tests reviewed include:  CBC. CMP. Mag. Renal US.     2.) Health maintenance:  Issues addressed today include following up with PCP for annual health maintenance and non-gynecologic issues.     3.) Elevated creatinine: Creatinine Monday 2.28.  She did not receive cisplatin this week but did get 1L fluids.  Recheck Tues.  Discussed that due to the steep increase in her creatinine I would recommend a renal US to make sure her kidneys are working appropriately.  Orders placed and message sent to scheduling; they will contact her to schedule.      Silvana Marin, IVAN, APRN, FNP-C, AOCNP  Oncology Nurse Practitioner  Division of Gynecologic Oncology  Pager: [phone]     CC  Patient Care Team:  Brooke Li PA-C as PCP - General (Family Medicine)  Carolina Hernandez DO as Physician (OB/Gyn)  Javier Roche MD as MD (Gynecologic Oncology)  Carolina Hernnadez DO  as Assigned OBGYN Provider  Daquan Cramer, RN as Specialty Care Coordinator (Hematology & Oncology)  Caio Jimenez MD as Physician (Radiation Oncology)  Everton Jimenez MD as MD (Radiation Oncology)  Brooke Li PA-C as Assigned PCP  Fior Rai MD as Assigned Cancer Care Provider  SELF, REFERRED      Again, thank you for allowing me to participate in the care of your patient.        Sincerely,        CUCO Teran CNP

## 2024-08-30 NOTE — NURSING NOTE
Current patient location:  [address] [place] [address].     Is the patient currently In the state of [place]? YES    Visit mode:TELEPHONE    If the visit is dropped, the patient can be reconnected by: TELEPHONE VISIT: Phone number:   Telephone Information:   Mobile [phone]       Will anyone else be joining the visit? NO  (If patient encounters technical issues they should call [phone]    How would you like to obtain your AVS? MyChart    Are changes needed to the allergy or medication list? No    Are refills needed on medications prescribed by this physician? NO    Rooming Documentation:  No questionnaires needed at this time.       Reason for visit: RECHECK    Eric ONEIL

## 2024-09-03 ENCOUNTER — INFUSION THERAPY VISIT (OUTPATIENT)
Dept: ONCOLOGY | Facility: CLINIC | Age: 64
End: 2024-09-03
Attending: NURSE PRACTITIONER
Payer: MEDICARE

## 2024-09-03 ENCOUNTER — APPOINTMENT (OUTPATIENT)
Dept: LAB | Facility: CLINIC | Age: 64
End: 2024-09-03
Attending: OBSTETRICS & GYNECOLOGY
Payer: MEDICARE

## 2024-09-03 ENCOUNTER — ANCILLARY PROCEDURE (OUTPATIENT)
Dept: ULTRASOUND IMAGING | Facility: CLINIC | Age: 64
End: 2024-09-03
Attending: NURSE PRACTITIONER
Payer: MEDICARE

## 2024-09-03 ENCOUNTER — APPOINTMENT (OUTPATIENT)
Dept: RADIATION ONCOLOGY | Facility: CLINIC | Age: 64
End: 2024-09-03
Attending: RADIOLOGY
Payer: MEDICARE

## 2024-09-03 VITALS
SYSTOLIC BLOOD PRESSURE: 114 MMHG | TEMPERATURE: 98.3 F | DIASTOLIC BLOOD PRESSURE: 75 MMHG | BODY MASS INDEX: 23.16 KG/M2 | WEIGHT: 147.9 LBS | OXYGEN SATURATION: 95 % | HEART RATE: 98 BPM | RESPIRATION RATE: 18 BRPM

## 2024-09-03 DIAGNOSIS — C51.9 VULVAR CANCER (H): Primary | ICD-10-CM

## 2024-09-03 DIAGNOSIS — C51.9 VULVAR CANCER (H): ICD-10-CM

## 2024-09-03 DIAGNOSIS — R79.89 ELEVATED SERUM CREATININE: ICD-10-CM

## 2024-09-03 LAB
ALBUMIN SERPL BCG-MCNC: 4 G/DL (ref 3.5–5.2)
ALP SERPL-CCNC: 58 U/L (ref 40–150)
ALT SERPL W P-5'-P-CCNC: 8 U/L (ref 0–50)
ANION GAP SERPL CALCULATED.3IONS-SCNC: 14 MMOL/L (ref 7–15)
ANION GAP SERPL CALCULATED.3IONS-SCNC: 8 MMOL/L (ref 7–15)
AST SERPL W P-5'-P-CCNC: 16 U/L (ref 0–45)
BASOPHILS # BLD AUTO: 0 10E3/UL (ref 0–0.2)
BASOPHILS NFR BLD AUTO: 1 %
BILIRUB SERPL-MCNC: 0.4 MG/DL
BUN SERPL-MCNC: 35.8 MG/DL (ref 8–23)
BUN SERPL-MCNC: 35.9 MG/DL (ref 8–23)
CALCIUM SERPL-MCNC: 8.6 MG/DL (ref 8.8–10.4)
CALCIUM SERPL-MCNC: 9.4 MG/DL (ref 8.8–10.4)
CHLORIDE SERPL-SCNC: 97 MMOL/L (ref 98–107)
CHLORIDE SERPL-SCNC: 98 MMOL/L (ref 98–107)
CREAT SERPL-MCNC: 2.09 MG/DL (ref 0.51–0.95)
CREAT SERPL-MCNC: 2.39 MG/DL (ref 0.51–0.95)
EGFRCR SERPLBLD CKD-EPI 2021: 22 ML/MIN/1.73M2
EGFRCR SERPLBLD CKD-EPI 2021: 26 ML/MIN/1.73M2
EOSINOPHIL # BLD AUTO: 0.1 10E3/UL (ref 0–0.7)
EOSINOPHIL NFR BLD AUTO: 2 %
ERYTHROCYTE [DISTWIDTH] IN BLOOD BY AUTOMATED COUNT: 11.8 % (ref 10–15)
GLUCOSE SERPL-MCNC: 109 MG/DL (ref 70–99)
GLUCOSE SERPL-MCNC: 252 MG/DL (ref 70–99)
HCO3 SERPL-SCNC: 27 MMOL/L (ref 22–29)
HCO3 SERPL-SCNC: 28 MMOL/L (ref 22–29)
HCT VFR BLD AUTO: 41.3 % (ref 35–47)
HGB BLD-MCNC: 14.3 G/DL (ref 11.7–15.7)
IMM GRANULOCYTES # BLD: 0 10E3/UL
IMM GRANULOCYTES NFR BLD: 0 %
LYMPHOCYTES # BLD AUTO: 0.9 10E3/UL (ref 0.8–5.3)
LYMPHOCYTES NFR BLD AUTO: 20 %
MAGNESIUM SERPL-MCNC: 1.9 MG/DL (ref 1.7–2.3)
MCH RBC QN AUTO: 33.2 PG (ref 26.5–33)
MCHC RBC AUTO-ENTMCNC: 34.6 G/DL (ref 31.5–36.5)
MCV RBC AUTO: 96 FL (ref 78–100)
MONOCYTES # BLD AUTO: 0.5 10E3/UL (ref 0–1.3)
MONOCYTES NFR BLD AUTO: 11 %
NEUTROPHILS # BLD AUTO: 2.9 10E3/UL (ref 1.6–8.3)
NEUTROPHILS NFR BLD AUTO: 66 %
NRBC # BLD AUTO: 0 10E3/UL
NRBC BLD AUTO-RTO: 0 /100
PLATELET # BLD AUTO: 209 10E3/UL (ref 150–450)
POTASSIUM SERPL-SCNC: 4.3 MMOL/L (ref 3.4–5.3)
POTASSIUM SERPL-SCNC: 5 MMOL/L (ref 3.4–5.3)
PROT SERPL-MCNC: 6.8 G/DL (ref 6.4–8.3)
RBC # BLD AUTO: 4.31 10E6/UL (ref 3.8–5.2)
SODIUM SERPL-SCNC: 134 MMOL/L (ref 135–145)
SODIUM SERPL-SCNC: 138 MMOL/L (ref 135–145)
WBC # BLD AUTO: 4.4 10E3/UL (ref 4–11)

## 2024-09-03 PROCEDURE — 96361 HYDRATE IV INFUSION ADD-ON: CPT

## 2024-09-03 PROCEDURE — 250N000011 HC RX IP 250 OP 636: Performed by: OBSTETRICS & GYNECOLOGY

## 2024-09-03 PROCEDURE — 83735 ASSAY OF MAGNESIUM: CPT | Performed by: NURSE PRACTITIONER

## 2024-09-03 PROCEDURE — 76770 US EXAM ABDO BACK WALL COMP: CPT | Mod: GC | Performed by: RADIOLOGY

## 2024-09-03 PROCEDURE — 77386 HC IMRT TREATMENT DELIVERY, COMPLEX: CPT | Performed by: RADIOLOGY

## 2024-09-03 PROCEDURE — 36415 COLL VENOUS BLD VENIPUNCTURE: CPT | Performed by: NURSE PRACTITIONER

## 2024-09-03 PROCEDURE — 80053 COMPREHEN METABOLIC PANEL: CPT | Performed by: NURSE PRACTITIONER

## 2024-09-03 PROCEDURE — 96360 HYDRATION IV INFUSION INIT: CPT

## 2024-09-03 PROCEDURE — 36415 COLL VENOUS BLD VENIPUNCTURE: CPT

## 2024-09-03 PROCEDURE — G0463 HOSPITAL OUTPT CLINIC VISIT: HCPCS | Mod: 25

## 2024-09-03 PROCEDURE — 85025 COMPLETE CBC W/AUTO DIFF WBC: CPT | Performed by: NURSE PRACTITIONER

## 2024-09-03 RX ORDER — DEXTROSE, SODIUM CHLORIDE, AND POTASSIUM CHLORIDE 5; .45; .15 G/100ML; G/100ML; G/100ML
2000 INJECTION INTRAVENOUS ONCE
Status: COMPLETED | OUTPATIENT
Start: 2024-09-03 | End: 2024-09-03

## 2024-09-03 RX ORDER — DEXTROSE, SODIUM CHLORIDE, AND POTASSIUM CHLORIDE 5; .45; .15 G/100ML; G/100ML; G/100ML
2000 INJECTION INTRAVENOUS ONCE
Status: CANCELLED | OUTPATIENT
Start: 2024-09-03 | End: 2024-09-03

## 2024-09-03 RX ADMIN — POTASSIUM CHLORIDE, DEXTROSE MONOHYDRATE AND SODIUM CHLORIDE 2000 ML: 150; 5; 450 INJECTION, SOLUTION INTRAVENOUS at 08:55

## 2024-09-03 ASSESSMENT — PAIN SCALES - GENERAL: PAINLEVEL: NO PAIN (0)

## 2024-09-03 NOTE — PATIENT INSTRUCTIONS
[facility] Triage and after hours / weekends / holidays:  [phone] option 5, option 2    Please call the triage or after hours line if you experience a temperature greater than or equal to 100.4, shaking chills, have uncontrolled nausea, vomiting and/or diarrhea, dizziness, shortness of breath, chest pain, bleeding, unexplained bruising, or if you have any other new/concerning symptoms, questions or concerns.      If you are having any concerning symptoms or wish to speak to a provider before your next infusion visit, please call triage to notify your care team so we can adequately serve you.     If you need a refill on a narcotic prescription or other medication, please call before your infusion appointment.

## 2024-09-03 NOTE — NURSING NOTE
Chief Complaint   Patient presents with    Chemotherapy     Cisplatin C1D8    Blood Draw     Labs drawn via PIV by RN in lab, vitals taken.      Labs drawn from PIV placed by RN. Line flushed with saline. Vitals taken. Pt checked in for appointment(s).    Gissel Brice RN

## 2024-09-03 NOTE — PROGRESS NOTES
Infusion Nursing Note:  Regina Gaspar presents today for Cisplatin C1D15 - not given/IV fluids.    Patient seen by provider today: No - Saw Silvana Marin CNP on 8/30   present during visit today: Not Applicable.    Note: Patient reports to infusion clinic feeling well. No questions or concerns following her appointment this past Friday. Agreeable to proceed with treatment today.     Creatinine today is elevated at 2.39. Per patient, she is drinking adequate amounts of fluids and her urine output is an adequate amount and clear/light yellow. Patient scheduled for renal ultrasound today at 1600, patient aware of this appointment and plans to attend.     NEW [place] CNP/Bela Dao RN 9/3/24 at 0840: -Give IV fluids today  -Recheck creatinine after IV fluids  -Does not need to wait for results before going to radiation appointment  -Cancel day 15 cisplatin treatment today      Silvana Marin CNP off until 9/6, message sent to Casi Greenfield CNP for follow up on repeat creatinine post IV fluids today.     Intravenous Access:  Peripheral IV placed.    Treatment Conditions:  Lab Results   Component Value Date    HGB 14.3 09/03/2024    WBC 4.4 09/03/2024    ANEUTAUTO 2.9 09/03/2024     09/03/2024        Lab Results   Component Value Date     09/03/2024    POTASSIUM 4.3 09/03/2024    MAG 1.9 09/03/2024    CR 2.39 (H) 09/03/2024    BRANDON 9.4 09/03/2024    BILITOTAL 0.4 09/03/2024    ALBUMIN 4.0 09/03/2024    ALT 8 09/03/2024    AST 16 09/03/2024      Latest Reference Range & Units 09/03/24 13:04   Sodium 135 - 145 mmol/L 134 (L)   Potassium 3.4 - 5.3 mmol/L 5.0   Chloride 98 - 107 mmol/L 98   Carbon Dioxide (CO2) 22 - 29 mmol/L 28   Urea Nitrogen 8.0 - 23.0 mg/dL 35.8 (H)   Creatinine 0.51 - 0.95 mg/dL 2.09 (H)   GFR Estimate >60 mL/min/1.73m2 26 (L)   Calcium 8.8 - 10.4 mg/dL 8.6 (L)   Anion Gap 7 - 15 mmol/L 8   (L): Data is abnormally low  (H): Data is abnormally high    Results reviewed,  labs did NOT meet treatment parameters: Creatinine 2.39.      Post Infusion Assessment:  Patient tolerated infusion without incident.  Blood return noted pre and post infusion.  Site patent and intact, free from redness, edema or discomfort.  No evidence of extravasations.  Access discontinued per protocol.       Discharge Plan:   Patient declined prescription refills.  Discharge instructions reviewed with: Patient.  Patient and/or family verbalized understanding of discharge instructions and all questions answered.  Copy of AVS reviewed with patient and/or family.  Patient will return 9/9 for next appointment.   Patient staying at [facility] and cannot get to infusion clinic before 0700 as the shuttle bus does not come earlier than 0700. In-basket sent to scheduling to reschedule patient's lab appointments for 0730 and infusion appointments to for 0800. Radiation therapy also notified of this schedule change as patient will not be able to get to radiation appointment until about 1330 on infusion days.  Patient discharged in stable condition accompanied by: self.  Departure Mode: Ambulatory.      Bela Dao RN

## 2024-09-04 ENCOUNTER — APPOINTMENT (OUTPATIENT)
Dept: RADIATION ONCOLOGY | Facility: CLINIC | Age: 64
End: 2024-09-04
Attending: RADIOLOGY
Payer: MEDICARE

## 2024-09-04 VITALS
SYSTOLIC BLOOD PRESSURE: 140 MMHG | BODY MASS INDEX: 23.34 KG/M2 | DIASTOLIC BLOOD PRESSURE: 83 MMHG | WEIGHT: 149 LBS | OXYGEN SATURATION: 99 % | HEART RATE: 94 BPM

## 2024-09-04 DIAGNOSIS — C51.9 VULVAR CANCER (H): Primary | ICD-10-CM

## 2024-09-04 PROCEDURE — 77386 HC IMRT TREATMENT DELIVERY, COMPLEX: CPT | Performed by: RADIOLOGY

## 2024-09-04 NOTE — LETTER
2024      Regina Gaspar  [address]  [facility] [address]      Dear Colleague,    Thank you for referring your patient, Regina Gaspar, to the [facility] RADIATION ONCOLOGY. Please see a copy of my visit note below.    [facility] PHYSICIANS  SPECIALIZING IN BREAKTHROUGHS  Radiation Oncology    On Treatment Visit Note      Regina Gaspar      Date: 2024   MRN: [id number]   : 1960  Diagnosis: Vulvar cancer      Reason for Visit:  On Radiation Treatment Visit     Treatment Summary to Date  Treatment Site: Pelvis Current Dose: 2160/6000 cGy Fractions:       Chemotherapy  Chemo concurrent with radx?: Yes  Oncologist: Kaley  Drug Name/Frequency 1: Ciplatin/weekly (Chemo held again this week r/t poor kidney function. Ultrasound completed)    ED Visit/Hospital Admission: None    Treatment Breaks: None      Subjective:   Regina was not able to receive Cisplatin again yesterday due to persistently elevated creatinine. Renal ultrasound was unremarkable. Other than that, Regina has been doing well. She denies nausea, diarrhea, bladder irritation. She has only been sparingly using Aquaphor. She does have Cavilon but has not started using it.     Nursing ROS:   Nutrition Alteration  Diet Type: Patient's Preference  Nutrition Note: appetite good  Skin  Skin Note: MD will check skin on table        Cardiovascular  Respiratory effort: 1 - Normal - without distress  Gastrointestinal  GI Note: WNL  Genitourinary   Note: WNL (Kidney ultrasound)  Psychosocial  Psychosocial Note: staying at [facility], going well  Pain Assessment  0-10 Pain Scale: 0      Objective:   BP (!) 140/83   Pulse 94   Wt 67.6 kg (149 lb)   LMP  (LMP Unknown)   SpO2 99%   BMI 23.34 kg/m    Gen: Appears well, in no acute distress  Skin: Mild diffuse erythema over treatment field    Labs:  CBC RESULTS:   Recent Labs   Lab Test 24  0745   WBC 4.4   RBC 4.31   HGB 14.3   HCT 41.3   MCV 96    MCH 33.2*   MCHC 34.6   RDW 11.8        ELECTROLYTES:  Recent Labs   Lab Test 09/03/24  1304   *   POTASSIUM 5.0   CHLORIDE 98   BRANDON 8.6*   CO2 28   BUN 35.8*   CR 2.09*   *       Assessment:    Tolerating radiation therapy well.  All questions and concerns addressed.    Toxicities:  Fatigue: Grade 1: Fatigue relieved by rest  Diarrhea: Grade 0: No toxicity  Urinary frequency: Grade 0: No toxicity  Urinary tract pain: Grade 0: No toxicity  Urinary urgency: Grade 0: No toxicity  Dermatitis: Grade 1: Faint erythema or dry desquamation    Plan:   Continue current therapy.    Discussed skin care. She is to more consistently use Cavilon.       Mosaiq chart and setup information reviewed  MVCT/IGRT images checked    Medication Review  Medication changes: No changes per pt    Educational Topic Discussed  Education Instructions: reviewed        Fior Rai MD/PhD  [phone] clinic  Pager [phone]    Please do not send letter to referring physician.         Again, thank you for allowing me to participate in the care of your patient.        Sincerely,        Fior Rai MD

## 2024-09-04 NOTE — PROGRESS NOTES
[facility] PHYSICIANS  SPECIALIZING IN BREAKTHROUGHS  Radiation Oncology    On Treatment Visit Note      Regina Gaspar      Date: 2024   MRN: [id number]   : 1960  Diagnosis: Vulvar cancer      Reason for Visit:  On Radiation Treatment Visit     Treatment Summary to Date  Treatment Site: Pelvis Current Dose: 2160/6000 cGy Fractions:       Chemotherapy  Chemo concurrent with radx?: Yes  Oncologist: Kaley  Drug Name/Frequency 1: Ciplatin/weekly (Chemo held again this week r/t poor kidney function. Ultrasound completed)    ED Visit/Hospital Admission: None    Treatment Breaks: None      Subjective:   Regina was not able to receive Cisplatin again yesterday due to persistently elevated creatinine. Renal ultrasound was unremarkable. Other than that, Regina has been doing well. She denies nausea, diarrhea, bladder irritation. She has only been sparingly using Aquaphor. She does have Cavilon but has not started using it.     Nursing ROS:   Nutrition Alteration  Diet Type: Patient's Preference  Nutrition Note: appetite good  Skin  Skin Note: MD will check skin on table        Cardiovascular  Respiratory effort: 1 - Normal - without distress  Gastrointestinal  GI Note: WNL  Genitourinary   Note: WNL (Kidney ultrasound)  Psychosocial  Psychosocial Note: staying at [facility], going well  Pain Assessment  0-10 Pain Scale: 0      Objective:   BP (!) 140/83   Pulse 94   Wt 67.6 kg (149 lb)   LMP  (LMP Unknown)   SpO2 99%   BMI 23.34 kg/m    Gen: Appears well, in no acute distress  Skin: Mild diffuse erythema over treatment field    Labs:  CBC RESULTS:   Recent Labs   Lab Test 24  0745   WBC 4.4   RBC 4.31   HGB 14.3   HCT 41.3   MCV 96   MCH 33.2*   MCHC 34.6   RDW 11.8        ELECTROLYTES:  Recent Labs   Lab Test 24  1304   *   POTASSIUM 5.0   CHLORIDE 98   BRANDON 8.6*   CO2 28   BUN 35.8*   CR 2.09*   *       Assessment:    Tolerating radiation therapy  well.  All questions and concerns addressed.    Toxicities:  Fatigue: Grade 1: Fatigue relieved by rest  Diarrhea: Grade 0: No toxicity  Urinary frequency: Grade 0: No toxicity  Urinary tract pain: Grade 0: No toxicity  Urinary urgency: Grade 0: No toxicity  Dermatitis: Grade 1: Faint erythema or dry desquamation    Plan:   Continue current therapy.    Discussed skin care. She is to more consistently use Cavilon.       Mosaiq chart and setup information reviewed  MVCT/IGRT images checked    Medication Review  Medication changes: No changes per pt    Educational Topic Discussed  Education Instructions: reviewed        Fior Rai MD/PhD  [phone] clinic  Pager [phone]    Please do not send letter to referring physician.

## 2024-09-05 ENCOUNTER — APPOINTMENT (OUTPATIENT)
Dept: RADIATION ONCOLOGY | Facility: CLINIC | Age: 64
End: 2024-09-05
Attending: RADIOLOGY
Payer: MEDICARE

## 2024-09-05 PROCEDURE — 77386 HC IMRT TREATMENT DELIVERY, COMPLEX: CPT | Performed by: RADIOLOGY

## 2024-09-05 PROCEDURE — 77014 PR CT GUIDE FOR PLACEMENT RADIATION THERAPY FIELDS: CPT | Mod: 26 | Performed by: STUDENT IN AN ORGANIZED HEALTH CARE EDUCATION/TRAINING PROGRAM

## 2024-09-06 ENCOUNTER — APPOINTMENT (OUTPATIENT)
Dept: RADIATION ONCOLOGY | Facility: CLINIC | Age: 64
End: 2024-09-06
Attending: RADIOLOGY
Payer: MEDICARE

## 2024-09-06 PROCEDURE — 77014 PR CT GUIDE FOR PLACEMENT RADIATION THERAPY FIELDS: CPT | Mod: 26 | Performed by: RADIOLOGY

## 2024-09-06 PROCEDURE — 77336 RADIATION PHYSICS CONSULT: CPT | Performed by: RADIOLOGY

## 2024-09-06 PROCEDURE — 77427 RADIATION TX MANAGEMENT X5: CPT | Performed by: RADIOLOGY

## 2024-09-06 PROCEDURE — 77386 HC IMRT TREATMENT DELIVERY, COMPLEX: CPT | Performed by: RADIOLOGY

## 2024-09-09 ENCOUNTER — APPOINTMENT (OUTPATIENT)
Dept: RADIATION ONCOLOGY | Facility: CLINIC | Age: 64
End: 2024-09-09
Attending: RADIOLOGY
Payer: MEDICARE

## 2024-09-09 ENCOUNTER — APPOINTMENT (OUTPATIENT)
Dept: LAB | Facility: CLINIC | Age: 64
End: 2024-09-09
Attending: NURSE PRACTITIONER
Payer: MEDICARE

## 2024-09-09 ENCOUNTER — INFUSION THERAPY VISIT (OUTPATIENT)
Dept: ONCOLOGY | Facility: CLINIC | Age: 64
End: 2024-09-09
Attending: NURSE PRACTITIONER
Payer: MEDICARE

## 2024-09-09 VITALS
WEIGHT: 146 LBS | BODY MASS INDEX: 22.87 KG/M2 | OXYGEN SATURATION: 95 % | DIASTOLIC BLOOD PRESSURE: 74 MMHG | TEMPERATURE: 98 F | HEART RATE: 107 BPM | RESPIRATION RATE: 16 BRPM | SYSTOLIC BLOOD PRESSURE: 112 MMHG

## 2024-09-09 VITALS — DIASTOLIC BLOOD PRESSURE: 72 MMHG | OXYGEN SATURATION: 95 % | SYSTOLIC BLOOD PRESSURE: 130 MMHG | HEART RATE: 73 BPM

## 2024-09-09 DIAGNOSIS — C51.9 VULVAR CANCER (H): Primary | ICD-10-CM

## 2024-09-09 LAB
ALBUMIN SERPL BCG-MCNC: 4.2 G/DL (ref 3.5–5.2)
ALP SERPL-CCNC: 55 U/L (ref 40–150)
ALT SERPL W P-5'-P-CCNC: 7 U/L (ref 0–50)
ANION GAP SERPL CALCULATED.3IONS-SCNC: 14 MMOL/L (ref 7–15)
AST SERPL W P-5'-P-CCNC: 19 U/L (ref 0–45)
BASOPHILS # BLD AUTO: 0 10E3/UL (ref 0–0.2)
BASOPHILS NFR BLD AUTO: 1 %
BILIRUB SERPL-MCNC: 0.4 MG/DL
BUN SERPL-MCNC: 15.6 MG/DL (ref 8–23)
CALCIUM SERPL-MCNC: 9.8 MG/DL (ref 8.8–10.4)
CHLORIDE SERPL-SCNC: 99 MMOL/L (ref 98–107)
CREAT SERPL-MCNC: 1.26 MG/DL (ref 0.51–0.95)
EGFRCR SERPLBLD CKD-EPI 2021: 47 ML/MIN/1.73M2
EOSINOPHIL # BLD AUTO: 0.1 10E3/UL (ref 0–0.7)
EOSINOPHIL NFR BLD AUTO: 3 %
ERYTHROCYTE [DISTWIDTH] IN BLOOD BY AUTOMATED COUNT: 11.5 % (ref 10–15)
GLUCOSE SERPL-MCNC: 107 MG/DL (ref 70–99)
HCO3 SERPL-SCNC: 27 MMOL/L (ref 22–29)
HCT VFR BLD AUTO: 40.1 % (ref 35–47)
HGB BLD-MCNC: 13.9 G/DL (ref 11.7–15.7)
IMM GRANULOCYTES # BLD: 0 10E3/UL
IMM GRANULOCYTES NFR BLD: 0 %
LYMPHOCYTES # BLD AUTO: 0.8 10E3/UL (ref 0.8–5.3)
LYMPHOCYTES NFR BLD AUTO: 24 %
MAGNESIUM SERPL-MCNC: 1.6 MG/DL (ref 1.7–2.3)
MCH RBC QN AUTO: 33.3 PG (ref 26.5–33)
MCHC RBC AUTO-ENTMCNC: 34.7 G/DL (ref 31.5–36.5)
MCV RBC AUTO: 96 FL (ref 78–100)
MONOCYTES # BLD AUTO: 0.5 10E3/UL (ref 0–1.3)
MONOCYTES NFR BLD AUTO: 14 %
NEUTROPHILS # BLD AUTO: 2 10E3/UL (ref 1.6–8.3)
NEUTROPHILS NFR BLD AUTO: 58 %
NRBC # BLD AUTO: 0 10E3/UL
NRBC BLD AUTO-RTO: 0 /100
PLATELET # BLD AUTO: 229 10E3/UL (ref 150–450)
POTASSIUM SERPL-SCNC: 3.8 MMOL/L (ref 3.4–5.3)
PROT SERPL-MCNC: 7.1 G/DL (ref 6.4–8.3)
RBC # BLD AUTO: 4.18 10E6/UL (ref 3.8–5.2)
SODIUM SERPL-SCNC: 140 MMOL/L (ref 135–145)
WBC # BLD AUTO: 3.3 10E3/UL (ref 4–11)

## 2024-09-09 PROCEDURE — 96361 HYDRATE IV INFUSION ADD-ON: CPT

## 2024-09-09 PROCEDURE — 77386 HC IMRT TREATMENT DELIVERY, COMPLEX: CPT | Performed by: RADIOLOGY

## 2024-09-09 PROCEDURE — 258N000003 HC RX IP 258 OP 636: Performed by: NURSE PRACTITIONER

## 2024-09-09 PROCEDURE — 84155 ASSAY OF PROTEIN SERUM: CPT | Performed by: OBSTETRICS & GYNECOLOGY

## 2024-09-09 PROCEDURE — 96367 TX/PROPH/DG ADDL SEQ IV INF: CPT

## 2024-09-09 PROCEDURE — 85004 AUTOMATED DIFF WBC COUNT: CPT | Performed by: OBSTETRICS & GYNECOLOGY

## 2024-09-09 PROCEDURE — 258N000003 HC RX IP 258 OP 636: Performed by: OBSTETRICS & GYNECOLOGY

## 2024-09-09 PROCEDURE — 36415 COLL VENOUS BLD VENIPUNCTURE: CPT | Performed by: OBSTETRICS & GYNECOLOGY

## 2024-09-09 PROCEDURE — 96413 CHEMO IV INFUSION 1 HR: CPT

## 2024-09-09 PROCEDURE — 96375 TX/PRO/DX INJ NEW DRUG ADDON: CPT

## 2024-09-09 PROCEDURE — 250N000011 HC RX IP 250 OP 636: Performed by: NURSE PRACTITIONER

## 2024-09-09 PROCEDURE — 250N000011 HC RX IP 250 OP 636: Performed by: OBSTETRICS & GYNECOLOGY

## 2024-09-09 PROCEDURE — 84295 ASSAY OF SERUM SODIUM: CPT | Performed by: OBSTETRICS & GYNECOLOGY

## 2024-09-09 PROCEDURE — 83735 ASSAY OF MAGNESIUM: CPT | Performed by: OBSTETRICS & GYNECOLOGY

## 2024-09-09 RX ORDER — MAGNESIUM SULFATE HEPTAHYDRATE 40 MG/ML
2 INJECTION, SOLUTION INTRAVENOUS ONCE
Status: COMPLETED | OUTPATIENT
Start: 2024-09-09 | End: 2024-09-09

## 2024-09-09 RX ORDER — DEXTROSE, SODIUM CHLORIDE, AND POTASSIUM CHLORIDE 5; .45; .15 G/100ML; G/100ML; G/100ML
2000 INJECTION INTRAVENOUS ONCE
Status: COMPLETED | OUTPATIENT
Start: 2024-09-09 | End: 2024-09-09

## 2024-09-09 RX ORDER — PALONOSETRON 0.05 MG/ML
0.25 INJECTION, SOLUTION INTRAVENOUS ONCE
Status: COMPLETED | OUTPATIENT
Start: 2024-09-09 | End: 2024-09-09

## 2024-09-09 RX ADMIN — PALONOSETRON HYDROCHLORIDE 0.25 MG: 0.25 INJECTION INTRAVENOUS at 09:49

## 2024-09-09 RX ADMIN — FOSAPREPITANT: 150 INJECTION, POWDER, LYOPHILIZED, FOR SOLUTION INTRAVENOUS at 09:52

## 2024-09-09 RX ADMIN — MAGNESIUM SULFATE 2 G: 2 INJECTION INTRAVENOUS at 08:45

## 2024-09-09 RX ADMIN — CISPLATIN 36 MG: 1 INJECTION, SOLUTION INTRAVENOUS at 10:27

## 2024-09-09 RX ADMIN — POTASSIUM CHLORIDE, DEXTROSE MONOHYDRATE AND SODIUM CHLORIDE 2000 ML: 150; 5; 450 INJECTION, SOLUTION INTRAVENOUS at 08:42

## 2024-09-09 ASSESSMENT — PAIN SCALES - GENERAL: PAINLEVEL: NO PAIN (0)

## 2024-09-09 NOTE — NURSING NOTE
Chief Complaint   Patient presents with    Blood Draw     Labs drawn via piv placed by RN in lab. VS taken.      Labs drawn from PIV placed by RN. Line flushed with saline. Vitals taken. Pt checked in for appointment(s).    Melanie Turk RN

## 2024-09-09 NOTE — PROGRESS NOTES
[facility] PHYSICIANS  SPECIALIZING IN BREAKTHROUGHS  Radiation Oncology    On Treatment Visit Note      Regina Gaspar      Date: 2024   MRN: [id number]   : 1960  Diagnosis: Vulvar cancer      Reason for Visit:  On Radiation Treatment Visit     Treatment Summary to Date  Treatment Site: Pelvis/Vulva Current Dose: 2700/6000 cGy Fractions: 15/30      Chemotherapy  Chemo concurrent with radx?: Yes  Oncologist: Kaley  Drug Name/Frequency 1: Ciplatin/weekly (Chemo held again this week r/t poor kidney function. Ultrasound completed)    ED Visit/Hospital Admission: None    Treatment Breaks:  for Labor Day    Subjective:   Regina continues to tolerate radiation treatment. She is having itchiness in the groin and central labia. The itchiness is partially alleviated by Cavilon. She is using janie bottle to cleanse after using bathroom. She is trying to leave her skin open to air as much as possible. She denies pain, fatigue, dysuria and bleeding.    Nursing ROS:   Nutrition Alteration  Diet Type: Patient's Preference  Nutrition Note: appetite good  Skin  Skin Note: MD will check skin on table        Cardiovascular  Respiratory effort: 1 - Normal - without distress  Gastrointestinal  GI Note: WNL  Genitourinary  Urinary Status: 0 - Normal  Psychosocial  Psychosocial Note: staying at [facility], going well  Pain Assessment  0-10 Pain Scale: 0     Objective:   /72   Pulse 73   LMP  (LMP Unknown)   SpO2 95%   Gen: Appears well, in no acute distress  Skin: Hyperpigmentation and moist desquamation in inguinal folds with erythema over labia    Labs:  CBC RESULTS:   Recent Labs   Lab Test 24  0745   WBC 4.4   RBC 4.31   HGB 14.3   HCT 41.3   MCV 96   MCH 33.2*   MCHC 34.6   RDW 11.8        ELECTROLYTES:  Recent Labs   Lab Test 24  1304   *   POTASSIUM 5.0   CHLORIDE 98   BRANDON 8.6*   CO2 28   BUN 35.8*   CR 2.09*   *       Assessment:    Tolerating radiation  therapy well.  All questions and concerns addressed.    Toxicities:  Fatigue: Grade 0: No toxicity  Diarrhea: Grade 1: Increase of <4 stools per day over baseline; mild increase in ostomy output compared to baseline  Urinary tract pain: Grade 0: No toxicity  Dermatitis: Grade 2: Moderate to brisk erythema; patchy moist desquamation, mostly confined to skin folds and creases; moderate erythema    Plan:   Continue current therapy.    Continue to use Cavilon cream in perineal area and reapply as needed for itching  Discussed use of Imodium as needed for diarrhea    Mosaiq chart and setup information reviewed  MVCT/IGRT images checked    Medication Review  Medication changes: No changes per pt    Educational Topic Discussed  Education Instructions: reviewed    Daija Strange MD PGY5    Fior Rai MD/PhD  [phone] clinic  Pager [phone]    Please do not send letter to referring physician.       I saw and examined the patient with the resident.  I have reviewed and edited the resident's note and agree with the plan of care.             Fior Rai MD

## 2024-09-09 NOTE — LETTER
2024      Regina Gaspar  [address]  [place] [address]      Dear Colleague,    Thank you for referring your patient, Regina Gaspar, to the [facility] RADIATION ONCOLOGY. Please see a copy of my visit note below.    [facility] PHYSICIANS  SPECIALIZING IN BREAKTHROUGHS  Radiation Oncology    On Treatment Visit Note      Regina Gaspar      Date: 2024   MRN: [id number]   : 1960  Diagnosis: Vulvar cancer      Reason for Visit:  On Radiation Treatment Visit     Treatment Summary to Date  Treatment Site: Pelvis/Vulva Current Dose: 2700/6000 cGy Fractions: 15/30      Chemotherapy  Chemo concurrent with radx?: Yes  Oncologist: Kaley  Drug Name/Frequency 1: Ciplatin/weekly (Chemo held again this week r/t poor kidney function. Ultrasound completed)    ED Visit/Hospital Admission: None    Treatment Breaks:  for Labor Day    Subjective:   Regina continues to tolerate radiation treatment. She is having itchiness in the groin and central labia. The itchiness is partially alleviated by Cavilon. She is using janie bottle to cleanse after using bathroom. She is trying to leave her skin open to air as much as possible. She denies pain, fatigue, dysuria and bleeding.    Nursing ROS:   Nutrition Alteration  Diet Type: Patient's Preference  Nutrition Note: appetite good  Skin  Skin Note: MD will check skin on table        Cardiovascular  Respiratory effort: 1 - Normal - without distress  Gastrointestinal  GI Note: WNL  Genitourinary  Urinary Status: 0 - Normal  Psychosocial  Psychosocial Note: staying at [facility], going well  Pain Assessment  0-10 Pain Scale: 0     Objective:   /72   Pulse 73   LMP  (LMP Unknown)   SpO2 95%   Gen: Appears well, in no acute distress  Skin: Hyperpigmentation and moist desquamation in inguinal folds with erythema over labia    Labs:  CBC RESULTS:   Recent Labs   Lab Test 24  0745   WBC 4.4   RBC 4.31   HGB 14.3   HCT 41.3    MCV 96   MCH 33.2*   MCHC 34.6   RDW 11.8        ELECTROLYTES:  Recent Labs   Lab Test 09/03/24  1304   *   POTASSIUM 5.0   CHLORIDE 98   BRANDON 8.6*   CO2 28   BUN 35.8*   CR 2.09*   *       Assessment:    Tolerating radiation therapy well.  All questions and concerns addressed.    Toxicities:  Fatigue: Grade 0: No toxicity  Diarrhea: Grade 1: Increase of <4 stools per day over baseline; mild increase in ostomy output compared to baseline  Urinary tract pain: Grade 0: No toxicity  Dermatitis: Grade 2: Moderate to brisk erythema; patchy moist desquamation, mostly confined to skin folds and creases; moderate erythema    Plan:   Continue current therapy.    Continue to use Cavilon cream in perineal area and reapply as needed for itching  Discussed use of Imodium as needed for diarrhea    Mosaiq chart and setup information reviewed  MVCT/IGRT images checked    Medication Review  Medication changes: No changes per pt    Educational Topic Discussed  Education Instructions: reviewed    Daija Strange MD PGY5    Fior Rai MD/PhD  [phone] clinic  Pager [phone]    Please do not send letter to referring physician.       I saw and examined the patient with the resident.  I have reviewed and edited the resident's note and agree with the plan of care.             Fior Rai MD       Again, thank you for allowing me to participate in the care of your patient.        Sincerely,        Fior Rai MD

## 2024-09-09 NOTE — PATIENT INSTRUCTIONS
You received an anti-nausea medication called Aloxi today. Aloxi is in the same drug class as one of your take home as needed anti-nausea medications called Ondanestron or Zofran. Do not take your Zofran prescription for the next three days because you are covered by the Aloxi you received today. You can begin to take your Zofran prescription as needed starting on Thursday, September 12th. If you need coverage for your nausea before then, feel free to use your compazine prescription.     [facility] Triage and after hours / weekends / holidays:  [phone]    Please call the triage or after hours line if you experience a temperature greater than or equal to 100.4, shaking chills, have uncontrolled nausea, vomiting and/or diarrhea, dizziness, shortness of breath, chest pain, bleeding, unexplained bruising, or if you have any other new/concerning symptoms, questions or concerns.      If you are having any concerning symptoms or wish to speak to a provider before your next infusion visit, please call triage to notify them so we can adequately serve you.     If you need a refill on a narcotic prescription or other medication, please call before your infusion appointment.

## 2024-09-10 ENCOUNTER — APPOINTMENT (OUTPATIENT)
Dept: RADIATION ONCOLOGY | Facility: CLINIC | Age: 64
End: 2024-09-10
Attending: RADIOLOGY
Payer: MEDICARE

## 2024-09-10 PROCEDURE — 77386 HC IMRT TREATMENT DELIVERY, COMPLEX: CPT | Performed by: RADIOLOGY

## 2024-09-10 PROCEDURE — 77014 PR CT GUIDE FOR PLACEMENT RADIATION THERAPY FIELDS: CPT | Mod: 26 | Performed by: RADIOLOGY

## 2024-09-11 ENCOUNTER — APPOINTMENT (OUTPATIENT)
Dept: RADIATION ONCOLOGY | Facility: CLINIC | Age: 64
End: 2024-09-11
Attending: RADIOLOGY
Payer: MEDICARE

## 2024-09-11 PROCEDURE — 77386 HC IMRT TREATMENT DELIVERY, COMPLEX: CPT | Performed by: RADIOLOGY

## 2024-09-11 PROCEDURE — 77014 PR CT GUIDE FOR PLACEMENT RADIATION THERAPY FIELDS: CPT | Mod: 26 | Performed by: RADIOLOGY

## 2024-09-12 ENCOUNTER — APPOINTMENT (OUTPATIENT)
Dept: RADIATION ONCOLOGY | Facility: CLINIC | Age: 64
End: 2024-09-12
Attending: RADIOLOGY
Payer: MEDICARE

## 2024-09-12 PROCEDURE — 77386 HC IMRT TREATMENT DELIVERY, COMPLEX: CPT | Performed by: RADIOLOGY

## 2024-09-12 PROCEDURE — 77014 PR CT GUIDE FOR PLACEMENT RADIATION THERAPY FIELDS: CPT | Mod: 26 | Performed by: RADIOLOGY

## 2024-09-12 NOTE — PROGRESS NOTES
Virtual Visit Details    Type of service:  Telephone Visit   Phone call duration: 10 minutes   Originating Location (pt. 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  She presents for follow up and disease management by phone.     Oncology History:  3/2024: Initially, she was seen in the ED visit 3/2024 with a vulvar lesion and some early satiety.      3/31/24 CT:  IMPRESSION:  1.  2.3 x 2.3 cm low-density lymph node or possible small fluid collection noted within the right groin. Could consider ultrasound and/or biopsy as clinically indicated.   2.  No other evidence of malignancy within the chest, abdomen, and pelvis.     24: PAP NILM, HPV 16+. Vulva, right, biopsy-  Well differentiated squamous cell carcinoma with keratinization, superficially invasive, incompletely excised     24 Radical local excision, bilateral inguinal and right pelvic LND  A. Vulva. 12 o'clock margin, biopsy:  - Squamous epithelium with reactive changes  - Negative for dysplasia or malignancy  B. Vulva, 3 o'clock margin, biopsy:   - Squamous epithelium with reactive changes  - Negative for dysplasia or malignancy  C. Vulva, 6 o'clock margin, biopsy:  - Squamous epithelium with reactive changes  - Negative for dysplasia or malignancy  D. Lymph node, right inguinal femoral, lymphadenectomy:  - METASTATIC SQUAMOUS CELL CARCINOMA in three of six lymph nodes (3/6)  - Largest metastatic deposit measures 3.6 cm  - No extra capsular extension identified  E. Lymph node, left inguinal femoral, lymphadenectomy:  - Two reactive lymph nodes examined, negative for malignancy (0/2)  F. Vulva, right,  "radical excision:  - INVASIVE KERATINIZING SQUAMOUS CELL CARCINOMA, moderately differentiated, HPV associated  - Size: 4.2 cm  - Depth of invasion 5 mm  - Surgical margins are negative for high grade dysplasia or carcinoma   G. Endocervix, curetting:  - Predominantly mucus with rare fragments of ectocervical and endocervical epithelium with reactive changes  - Negative for dysplasia or malignancy  H. Cervix, random, biopsies:  - Ectocervical and endocervical junctional tissue with reactive epithelial changes  - Negative for dysplasia or malignancy   I. Lymph node, right pelvic, lymphadenectomy:  - Four reactive lymph nodes examined, negative for malignancy (0/4)     Plan: Chemoradiation with weekly cisplatin 40 mg/m2     24: Cycle 1 day 1 cisplatin.    24: Cycle 1 day 8 cisplatin Creatinine 2.28.  Cisplatin held and given 1L D5 1/2 NS.  9/3/24: Cycle 1 day 15 cisplatin. Creatinine 2.39.  Cisplatin held and given 2L D5 1/2 NS. Creatinine down to 2.09 after fluids.  Renal US  IMPRESSION:  1.  Normal renal ultrasound without hydronephrosis or suspicious mass.  24: Cycle 1 day 22 cisplatin 20 mg/m2 due to kidney function. Creatinine 1.26.    : Cycle 1 day 29 cisplatin planned.              Today she reports;    -Vaginal bleeding/discharge: No  -Pain: Some pain on the \"bottom\", using cavilan  -Nausea or vomiting: No  -Appetite: Normal appetite  -Weight loss: Weight is overall  stable   -Diarrhea/Constipation: She had one episode of diarrhea that she thinks was from eating lactose  -Neuropathy symptoms: No  -Tinnitus:No  -Leg swelling: No  -Fevers: No  -Chest pain: No  -SOB: No  Water trying 8 bottles, 16.9 oz bottles                  Past Medical History:    Past Medical History:   Diagnosis Date    Vulvar cancer (H) 2024         Past Surgical History:    Past Surgical History:   Procedure Laterality Date     SECTION      LAPAROSCOPIC LYMPHADENECTOMY N/A 2024    Procedure: Laparoscopic " lymphadenectomy;  Surgeon: Javier Roche MD;  Location: [facility] OR    VULVECTOMY RADICAL DISSECT GROIN(S) N/A 5/28/2024    Procedure: Radical excision of right vulvar lesion. Removal of lymph nodes from left and right groin. Biopsies of cervix.;  Surgeon: Javier Roche MD;  Location:  OR         Health Maintenance Due   Topic Date Due    DEXA  Never done    ADVANCE CARE PLANNING  Never done    DEPRESSION ACTION PLAN  Never done    MAMMO SCREENING  Never done    Pneumococcal Vaccine: 65+ Years (1 of 2 - PCV) Never done    COLORECTAL CANCER SCREENING  Never done    HIV SCREENING  Never done    HEPATITIS C SCREENING  Never done    ZOSTER IMMUNIZATION (1 of 2) Never done    LIPID  Never done    RSV VACCINE (1 - Risk 60-74 years 1-dose series) Never done    COVID-19 Vaccine (3 - Pfizer risk series) 11/22/2021    MEDICARE ANNUAL WELLNESS VISIT  Never done    COLPOSCOPY  Never done    INFLUENZA VACCINE (1) 09/01/2024       Current Medications:     Current Outpatient Medications   Medication Sig Dispense Refill    dexAMETHasone (DECADRON) 4 MG tablet Take 2 tablets (8 mg) by mouth daily Take for 3 days, starting the day after chemo on day 2 and 9. Take with food. If no nausea and vomiting with first cisplatin doses, may stop dexamethasone. 12 tablet 2    ondansetron (ZOFRAN) 8 MG tablet Take 1 tablet (8 mg) by mouth every 8 hours as needed for nausea (vomiting) Do not take for 3 days after chemo 30 tablet 2    prochlorperazine (COMPAZINE) 5 MG tablet Take 1 tablet (5 mg) by mouth every 6 hours as needed for nausea or vomiting 30 tablet 2    traZODone (DESYREL) 100 MG tablet Take 1-2 tablets (100-200 mg) by mouth at bedtime 180 tablet 1         Allergies:      No Known Allergies     Social History:     Social History     Tobacco Use    Smoking status: Former     Types: Cigarettes     Passive exposure: Past    Smokeless tobacco: Never    Tobacco comments:     50 pack years. 8/8/24, has not had a cigarette  "in a couple weeks   Substance Use Topics    Alcohol use: Yes     Comment: quart vodka/ week       History   Drug Use Unknown         Family History:     The patient's family history is notable for:    Family History   Problem Relation Age of Onset    Breast Cancer Mother         has had a couple times    Colon Cancer Father 80    Uterine Cancer No family hx of          Physical Exam:     Ht 1.727 m (5' 8\")   Wt 65.8 kg (145 lb)   LMP  (LMP Unknown)   BMI 22.05 kg/m    Body mass index is 22.05 kg/m .    Respiratory: No audible wheeze, cough.      Psychiatric: appropriate mood and affect. Mentation appears normal, affect normal/bright, judgement and insight intact, normal speech       The rest of a comprehensive physical examination is deferred due phone visit restrictions.      No results found for this or any previous visit (from the past 24 hour(s)).       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  She presents for follow up and disease management by phone.     10 minutes spent on the date of the encounter doing chart review, history and exam, documentation, and further activities as noted above.      Plan:     1.)        Vulvar cancer:  OK to proceed with planned treatment as scheduled if labs are WDL.  RTC in 1 week for a symptom assessment visit; this is already scheduled. Reviewed signs and symptoms for when she should contact the clinic or seek additional care.  Patient to contact the clinic with any questions or concerns in the interim.      Genetic risk factors were assessed and she does not meet qualification for referral.     Labs and/or tests reviewed include:  CBC. CMP. Mag.                2.)        Health maintenance:  Issues addressed today include following up with PCP for annual health maintenance and non-gynecologic issues.      3.)        Elevated creatinine: Creatinine Monday was 1.26.  Renal US was " WDL.  She received cisplatin 20 mg/m2 this week.  Recheck Monday with treatment.      Silvana Marin, DNP, APRN, FNP-C, AOCNP  Oncology Nurse Practitioner  Division of Gynecologic Oncology  Pager: [phone]     CC  Patient Care Team:  Brooke Li PA-C as PCP - General (Family Medicine)  Carolina Hernandez DO as Physician (OB/Gyn)  Javier Roche MD as MD (Gynecologic Oncology)  Carolina Hernandez DO as Assigned OBGYN Provider  Daquan Cramer RN as Specialty Care Coordinator (Hematology & Oncology)  Caio Jimenez MD as Physician (Radiation Oncology)  Everton Jimenez MD as MD (Radiation Oncology)  Brooke Li PA-C as Assigned PCP  Fior Rai MD as Assigned Cancer Care Provider  SELF, REFERRED

## 2024-09-13 ENCOUNTER — VIRTUAL VISIT (OUTPATIENT)
Dept: ONCOLOGY | Facility: CLINIC | Age: 64
End: 2024-09-13
Attending: NURSE PRACTITIONER
Payer: MEDICARE

## 2024-09-13 ENCOUNTER — APPOINTMENT (OUTPATIENT)
Dept: RADIATION ONCOLOGY | Facility: CLINIC | Age: 64
End: 2024-09-13
Attending: RADIOLOGY
Payer: MEDICARE

## 2024-09-13 VITALS — WEIGHT: 145 LBS | BODY MASS INDEX: 21.98 KG/M2 | HEIGHT: 68 IN

## 2024-09-13 DIAGNOSIS — C51.9 VULVAR CANCER (H): Primary | ICD-10-CM

## 2024-09-13 PROCEDURE — 99441 PR PHYSICIAN TELEPHONE EVALUATION 5-10 MIN: CPT | Mod: 93 | Performed by: NURSE PRACTITIONER

## 2024-09-13 PROCEDURE — 77336 RADIATION PHYSICS CONSULT: CPT | Performed by: RADIOLOGY

## 2024-09-13 PROCEDURE — 77386 HC IMRT TREATMENT DELIVERY, COMPLEX: CPT | Performed by: RADIOLOGY

## 2024-09-13 PROCEDURE — 77014 PR CT GUIDE FOR PLACEMENT RADIATION THERAPY FIELDS: CPT | Mod: 26 | Performed by: RADIOLOGY

## 2024-09-13 ASSESSMENT — PAIN SCALES - GENERAL: PAINLEVEL: NO PAIN (0)

## 2024-09-13 NOTE — NURSING NOTE
Current patient location:  At clinic    Is the patient currently in the state of [place]? YES    Visit mode:TELEPHONE    If the visit is dropped, the patient can be reconnected by: TELEPHONE VISIT: Phone number:   Telephone Information:   Mobile [phone]       Will anyone else be joining the visit? NO  (If patient encounters technical issues they should call [phone]    How would you like to obtain your AVS? Mail a copy    Are changes needed to the allergy or medication list? No    Are refills needed on medications prescribed by this physician? NO    Rooming Documentation:  Not applicable      Reason for visit: RECHDIANDRA ONEIL

## 2024-09-13 NOTE — LETTER
2024      Regina Gaspar  [address]  [facility] [address]      Dear Colleague,    Thank you for referring your patient, Regina Gaspar, to the [facility]. Please see a copy of my visit note below.    Virtual Visit Details    Type of service:  Telephone Visit   Phone call duration: 10 minutes   Originating Location (pt. 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  She presents for follow up and disease management by phone.     Oncology History:  3/2024: Initially, she was seen in the ED visit 3/2024 with a vulvar lesion and some early satiety.      3/31/24 CT:  IMPRESSION:  1.  2.3 x 2.3 cm low-density lymph node or possible small fluid collection noted within the right groin. Could consider ultrasound and/or biopsy as clinically indicated.   2.  No other evidence of malignancy within the chest, abdomen, and pelvis.     24: PAP NILM, HPV 16+. Vulva, right, biopsy-  Well differentiated squamous cell carcinoma with keratinization, superficially invasive, incompletely excised     24 Radical local excision, bilateral inguinal and right pelvic LND  A. Vulva. 12 o'clock margin, biopsy:  - Squamous epithelium with reactive changes  - Negative for dysplasia or malignancy  B. Vulva, 3 o'clock margin, biopsy:   - Squamous epithelium with reactive changes  - Negative for dysplasia or malignancy  C. Vulva, 6 o'clock margin, biopsy:  - Squamous epithelium with reactive changes  - Negative for dysplasia or malignancy  D. Lymph node, right inguinal femoral, lymphadenectomy:  - METASTATIC SQUAMOUS CELL CARCINOMA in three of six  "lymph nodes (3/6)  - Largest metastatic deposit measures 3.6 cm  - No extra capsular extension identified  E. Lymph node, left inguinal femoral, lymphadenectomy:  - Two reactive lymph nodes examined, negative for malignancy (0/2)  F. Vulva, right, radical excision:  - INVASIVE KERATINIZING SQUAMOUS CELL CARCINOMA, moderately differentiated, HPV associated  - Size: 4.2 cm  - Depth of invasion 5 mm  - Surgical margins are negative for high grade dysplasia or carcinoma   G. Endocervix, curetting:  - Predominantly mucus with rare fragments of ectocervical and endocervical epithelium with reactive changes  - Negative for dysplasia or malignancy  H. Cervix, random, biopsies:  - Ectocervical and endocervical junctional tissue with reactive epithelial changes  - Negative for dysplasia or malignancy   I. Lymph node, right pelvic, lymphadenectomy:  - Four reactive lymph nodes examined, negative for malignancy (0/4)     Plan: Chemoradiation with weekly cisplatin 40 mg/m2     8/19/24: Cycle 1 day 1 cisplatin.    8/26/24: Cycle 1 day 8 cisplatin Creatinine 2.28.  Cisplatin held and given 1L D5 1/2 NS.  9/3/24: Cycle 1 day 15 cisplatin. Creatinine 2.39.  Cisplatin held and given 2L D5 1/2 NS. Creatinine down to 2.09 after fluids.  Renal US  IMPRESSION:  1.  Normal renal ultrasound without hydronephrosis or suspicious mass.  9/9/24: Cycle 1 day 22 cisplatin 20 mg/m2 due to kidney function. Creatinine 1.26.    916/24: Cycle 1 day 29 cisplatin planned.              Today she reports;    -Vaginal bleeding/discharge: No  -Pain: Some pain on the \"bottom\", using cavilan  -Nausea or vomiting: No  -Appetite: Normal appetite  -Weight loss: Weight is overall  stable   -Diarrhea/Constipation: She had one episode of diarrhea that she thinks was from eating lactose  -Neuropathy symptoms: No  -Tinnitus:No  -Leg swelling: No  -Fevers: No  -Chest pain: No  -SOB: No  Water trying 8 bottles, 16.9 oz bottles                  Past Medical " History:    Past Medical History:   Diagnosis Date     Vulvar cancer (H) 2024         Past Surgical History:    Past Surgical History:   Procedure Laterality Date      SECTION       LAPAROSCOPIC LYMPHADENECTOMY N/A 2024    Procedure: Laparoscopic lymphadenectomy;  Surgeon: Javier Roche MD;  Location: [facility] OR     VULVECTOMY RADICAL DISSECT GROIN(S) N/A 2024    Procedure: Radical excision of right vulvar lesion. Removal of lymph nodes from left and right groin. Biopsies of cervix.;  Surgeon: Javier Roche MD;  Location:  OR         Health Maintenance Due   Topic Date Due     DEXA  Never done     ADVANCE CARE PLANNING  Never done     DEPRESSION ACTION PLAN  Never done     MAMMO SCREENING  Never done     Pneumococcal Vaccine: 65+ Years (1 of 2 - PCV) Never done     COLORECTAL CANCER SCREENING  Never done     HIV SCREENING  Never done     HEPATITIS C SCREENING  Never done     ZOSTER IMMUNIZATION (1 of 2) Never done     LIPID  Never done     RSV VACCINE (1 - Risk 60-74 years 1-dose series) Never done     COVID-19 Vaccine (3 - Pfizer risk series) 2021     MEDICARE ANNUAL WELLNESS VISIT  Never done     COLPOSCOPY  Never done     INFLUENZA VACCINE (1) 2024       Current Medications:     Current Outpatient Medications   Medication Sig Dispense Refill     dexAMETHasone (DECADRON) 4 MG tablet Take 2 tablets (8 mg) by mouth daily Take for 3 days, starting the day after chemo on day 2 and 9. Take with food. If no nausea and vomiting with first cisplatin doses, may stop dexamethasone. 12 tablet 2     ondansetron (ZOFRAN) 8 MG tablet Take 1 tablet (8 mg) by mouth every 8 hours as needed for nausea (vomiting) Do not take for 3 days after chemo 30 tablet 2     prochlorperazine (COMPAZINE) 5 MG tablet Take 1 tablet (5 mg) by mouth every 6 hours as needed for nausea or vomiting 30 tablet 2     traZODone (DESYREL) 100 MG tablet Take 1-2 tablets (100-200 mg) by mouth at bedtime  "180 tablet 1         Allergies:      No Known Allergies     Social History:     Social History     Tobacco Use     Smoking status: Former     Types: Cigarettes     Passive exposure: Past     Smokeless tobacco: Never     Tobacco comments:     50 pack years. 8/8/24, has not had a cigarette in a couple weeks   Substance Use Topics     Alcohol use: Yes     Comment: quart vodka/ week       History   Drug Use Unknown         Family History:     The patient's family history is notable for:    Family History   Problem Relation Age of Onset     Breast Cancer Mother         has had a couple times     Colon Cancer Father 80     Uterine Cancer No family hx of          Physical Exam:     Ht 1.727 m (5' 8\")   Wt 65.8 kg (145 lb)   LMP  (LMP Unknown)   BMI 22.05 kg/m    Body mass index is 22.05 kg/m .    Respiratory: No audible wheeze, cough.      Psychiatric: appropriate mood and affect. Mentation appears normal, affect normal/bright, judgement and insight intact, normal speech       The rest of a comprehensive physical examination is deferred due phone visit restrictions.      No results found for this or any previous visit (from the past 24 hour(s)).       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  She presents for follow up and disease management by phone.     10 minutes spent on the date of the encounter doing chart review, history and exam, documentation, and further activities as noted above.      Plan:     1.)        Vulvar cancer:  OK to proceed with planned treatment as scheduled if labs are WDL.  RTC in 1 week for a symptom assessment visit; this is already scheduled. Reviewed signs and symptoms for when she should contact the clinic or seek additional care.  Patient to contact the clinic with any questions or concerns in the interim.      Genetic risk factors were assessed and she does not meet qualification for " referral.     Labs and/or tests reviewed include:  CBC. CMP. Mag.                2.)        Health maintenance:  Issues addressed today include following up with PCP for annual health maintenance and non-gynecologic issues.      3.)        Elevated creatinine: Creatinine Monday was 1.26.  Renal US was WDL.  She received cisplatin 20 mg/m2 this week.  Recheck Monday with treatment.      Silvana Marin, IVAN, APRN, FNP-C, AOCNP  Oncology Nurse Practitioner  Division of Gynecologic Oncology  Pager: [phone]     CC  Patient Care Team:  Brooke Li PA-C as PCP - General (Family Medicine)  Carolina Hernandez DO as Physician (OB/Gyn)  Javier Roche MD as MD (Gynecologic Oncology)  Carolina Hernandez DO as Assigned OBGYN Provider  Daquan Cramer, RN as Specialty Care Coordinator (Hematology & Oncology)  Caio Jimenez MD as Physician (Radiation Oncology)  Everton Jimenez MD as MD (Radiation Oncology)  Brooke Li PA-C as Assigned PCP  Fior Rai MD as Assigned Cancer Care Provider  SELF, REFERRED      Again, thank you for allowing me to participate in the care of your patient.        Sincerely,        CUCO Teran CNP

## 2024-09-16 ENCOUNTER — APPOINTMENT (OUTPATIENT)
Dept: RADIATION ONCOLOGY | Facility: CLINIC | Age: 64
End: 2024-09-16
Attending: RADIOLOGY
Payer: MEDICARE

## 2024-09-16 ENCOUNTER — INFUSION THERAPY VISIT (OUTPATIENT)
Dept: ONCOLOGY | Facility: CLINIC | Age: 64
End: 2024-09-16
Attending: OBSTETRICS & GYNECOLOGY
Payer: MEDICARE

## 2024-09-16 VITALS
BODY MASS INDEX: 21.93 KG/M2 | RESPIRATION RATE: 16 BRPM | WEIGHT: 144.2 LBS | HEART RATE: 84 BPM | TEMPERATURE: 97.9 F | OXYGEN SATURATION: 100 % | DIASTOLIC BLOOD PRESSURE: 72 MMHG | SYSTOLIC BLOOD PRESSURE: 112 MMHG

## 2024-09-16 VITALS
OXYGEN SATURATION: 97 % | DIASTOLIC BLOOD PRESSURE: 80 MMHG | HEART RATE: 79 BPM | SYSTOLIC BLOOD PRESSURE: 117 MMHG | WEIGHT: 144 LBS | BODY MASS INDEX: 21.9 KG/M2

## 2024-09-16 DIAGNOSIS — C51.9 VULVAR CANCER (H): Primary | ICD-10-CM

## 2024-09-16 LAB
ALBUMIN SERPL BCG-MCNC: 4.5 G/DL (ref 3.5–5.2)
ALP SERPL-CCNC: 71 U/L (ref 40–150)
ALT SERPL W P-5'-P-CCNC: 11 U/L (ref 0–50)
ANION GAP SERPL CALCULATED.3IONS-SCNC: 14 MMOL/L (ref 7–15)
AST SERPL W P-5'-P-CCNC: 18 U/L (ref 0–45)
BASOPHILS # BLD AUTO: 0 10E3/UL (ref 0–0.2)
BASOPHILS NFR BLD AUTO: 1 %
BILIRUB SERPL-MCNC: 0.5 MG/DL
BUN SERPL-MCNC: 10 MG/DL (ref 8–23)
CALCIUM SERPL-MCNC: 10 MG/DL (ref 8.8–10.4)
CHLORIDE SERPL-SCNC: 97 MMOL/L (ref 98–107)
CREAT SERPL-MCNC: 1.05 MG/DL (ref 0.51–0.95)
EGFRCR SERPLBLD CKD-EPI 2021: 59 ML/MIN/1.73M2
EOSINOPHIL # BLD AUTO: 0.2 10E3/UL (ref 0–0.7)
EOSINOPHIL NFR BLD AUTO: 3 %
ERYTHROCYTE [DISTWIDTH] IN BLOOD BY AUTOMATED COUNT: 11.3 % (ref 10–15)
GLUCOSE SERPL-MCNC: 115 MG/DL (ref 70–99)
HCO3 SERPL-SCNC: 28 MMOL/L (ref 22–29)
HCT VFR BLD AUTO: 44.2 % (ref 35–47)
HGB BLD-MCNC: 15.2 G/DL (ref 11.7–15.7)
IMM GRANULOCYTES # BLD: 0 10E3/UL
IMM GRANULOCYTES NFR BLD: 0 %
LYMPHOCYTES # BLD AUTO: 0.8 10E3/UL (ref 0.8–5.3)
LYMPHOCYTES NFR BLD AUTO: 18 %
MAGNESIUM SERPL-MCNC: 1.5 MG/DL (ref 1.7–2.3)
MCH RBC QN AUTO: 32.9 PG (ref 26.5–33)
MCHC RBC AUTO-ENTMCNC: 34.4 G/DL (ref 31.5–36.5)
MCV RBC AUTO: 96 FL (ref 78–100)
MONOCYTES # BLD AUTO: 0.5 10E3/UL (ref 0–1.3)
MONOCYTES NFR BLD AUTO: 12 %
NEUTROPHILS # BLD AUTO: 3 10E3/UL (ref 1.6–8.3)
NEUTROPHILS NFR BLD AUTO: 66 %
NRBC # BLD AUTO: 0 10E3/UL
NRBC BLD AUTO-RTO: 0 /100
PLATELET # BLD AUTO: 172 10E3/UL (ref 150–450)
POTASSIUM SERPL-SCNC: 3.6 MMOL/L (ref 3.4–5.3)
PROT SERPL-MCNC: 7.6 G/DL (ref 6.4–8.3)
RBC # BLD AUTO: 4.62 10E6/UL (ref 3.8–5.2)
SODIUM SERPL-SCNC: 139 MMOL/L (ref 135–145)
WBC # BLD AUTO: 4.5 10E3/UL (ref 4–11)

## 2024-09-16 PROCEDURE — 258N000003 HC RX IP 258 OP 636: Performed by: NURSE PRACTITIONER

## 2024-09-16 PROCEDURE — 258N000003 HC RX IP 258 OP 636: Performed by: OBSTETRICS & GYNECOLOGY

## 2024-09-16 PROCEDURE — 77386 HC IMRT TREATMENT DELIVERY, COMPLEX: CPT | Performed by: RADIOLOGY

## 2024-09-16 PROCEDURE — 96375 TX/PRO/DX INJ NEW DRUG ADDON: CPT

## 2024-09-16 PROCEDURE — 85025 COMPLETE CBC W/AUTO DIFF WBC: CPT | Performed by: OBSTETRICS & GYNECOLOGY

## 2024-09-16 PROCEDURE — 250N000011 HC RX IP 250 OP 636: Performed by: OBSTETRICS & GYNECOLOGY

## 2024-09-16 PROCEDURE — 96367 TX/PROPH/DG ADDL SEQ IV INF: CPT

## 2024-09-16 PROCEDURE — 250N000011 HC RX IP 250 OP 636: Performed by: NURSE PRACTITIONER

## 2024-09-16 PROCEDURE — 96413 CHEMO IV INFUSION 1 HR: CPT

## 2024-09-16 PROCEDURE — 82040 ASSAY OF SERUM ALBUMIN: CPT | Performed by: OBSTETRICS & GYNECOLOGY

## 2024-09-16 PROCEDURE — 83735 ASSAY OF MAGNESIUM: CPT | Performed by: OBSTETRICS & GYNECOLOGY

## 2024-09-16 PROCEDURE — 96361 HYDRATE IV INFUSION ADD-ON: CPT

## 2024-09-16 PROCEDURE — 36415 COLL VENOUS BLD VENIPUNCTURE: CPT | Performed by: OBSTETRICS & GYNECOLOGY

## 2024-09-16 RX ORDER — MAGNESIUM SULFATE HEPTAHYDRATE 40 MG/ML
2 INJECTION, SOLUTION INTRAVENOUS ONCE
Status: COMPLETED | OUTPATIENT
Start: 2024-09-16 | End: 2024-09-16

## 2024-09-16 RX ORDER — DEXTROSE, SODIUM CHLORIDE, AND POTASSIUM CHLORIDE 5; .45; .15 G/100ML; G/100ML; G/100ML
2000 INJECTION INTRAVENOUS ONCE
Status: COMPLETED | OUTPATIENT
Start: 2024-09-16 | End: 2024-09-16

## 2024-09-16 RX ORDER — PALONOSETRON 0.05 MG/ML
0.25 INJECTION, SOLUTION INTRAVENOUS ONCE
Status: COMPLETED | OUTPATIENT
Start: 2024-09-16 | End: 2024-09-16

## 2024-09-16 RX ADMIN — PALONOSETRON HYDROCHLORIDE 0.25 MG: 0.25 INJECTION INTRAVENOUS at 09:08

## 2024-09-16 RX ADMIN — POTASSIUM CHLORIDE, DEXTROSE MONOHYDRATE AND SODIUM CHLORIDE 2000 ML: 150; 5; 450 INJECTION, SOLUTION INTRAVENOUS at 07:52

## 2024-09-16 RX ADMIN — FOSAPREPITANT: 150 INJECTION, POWDER, LYOPHILIZED, FOR SOLUTION INTRAVENOUS at 09:11

## 2024-09-16 RX ADMIN — CISPLATIN 36 MG: 1 INJECTION, SOLUTION INTRAVENOUS at 10:38

## 2024-09-16 RX ADMIN — MAGNESIUM SULFATE 2 G: 2 INJECTION INTRAVENOUS at 09:35

## 2024-09-16 ASSESSMENT — PAIN SCALES - GENERAL: PAINLEVEL: NO PAIN (0)

## 2024-09-16 NOTE — LETTER
"2024      Regina Gaspar  [address]  [facility] [address]      Dear Colleague,    Thank you for referring your patient, Regina Gaspar, to the [facility]. Please see a copy of my visit note below.    [facility] PHYSICIANS  SPECIALIZING IN BREAKTHROUGHS  Radiation Oncology    On Treatment Visit Note      Regina Gaspar      Date: 2024   MRN: [id number]   : 1960  Diagnosis: Vulvar cancer      Reason for Visit:  On Radiation Treatment Visit     Treatment Summary to Date  Treatment Site: Pelvis Current Dose: 3600/6000 (dose painting) cGy Fractions: 20/30      Chemotherapy  Chemo concurrent with radx?: Yes  Oncologist: Kaley  Drug Name/Frequency 1: Ciplatin/weekly    ED Visit/Hospital Admission: None    Treatment Breaks: None (other than planned break on Labor Day)      Subjective:   Regina states that her skin is better this week. She is more diligent with Cavilon use and feels that her skin is less \"wet\" and \"weepy\". She denies any pain in the treated area. She has no diarrhea or bladder irritation.     Nursing ROS:   Nutrition Alteration  Diet Type: Patient's Preference  Anorexia NCI: 0 - None  Nutrition Note: appetite good  Skin  Skin Reaction: 1 - Faint erythema or dry desquamation (per pt, itchy at times.)  Skin Intervention: cavelon cream three times day  Skin Note: MD will check skin on table  CNS Alteration  CNS note: denies problems  ENT and Mouth Exam  Mucositis - Current: 0 - None   Cardiovascular  Respiratory effort: 1 - Normal - without distress  Gastrointestinal  Nausea: 0 - None  Vomitin - No vomiting (ons)  Diarrhea: 0 - None  GI Note: WNL  Genitourinary  Urinary Status: 1 - Increase in frequency or nocturia up to 2x normal (2x/night)  Dysuria: 0 - None  Psychosocial  Mood - Anxiety: 0 - Normal  Mood - Depression: 0 - Normal  Psychosocial Note: staying at [facility], going well  Pain Assessment  0-10 Pain Scale: " 0      Objective:   /80 (BP Location: Right arm, Patient Position: Sitting, Cuff Size: Adult Regular)   Pulse 79   Wt 65.3 kg (144 lb)   LMP  (LMP Unknown)   SpO2 97%   BMI 21.90 kg/m    Gen: Appears well, in no acute distress  Skin: Hyperpigmentation of the vulva and bilateral groin; mild edema of the left labia. No overt breakdown.     Labs:  CBC RESULTS:   Recent Labs   Lab Test 09/16/24  0746   WBC 4.5   RBC 4.62   HGB 15.2   HCT 44.2   MCV 96   MCH 32.9   MCHC 34.4   RDW 11.3        ELECTROLYTES:  Recent Labs   Lab Test 09/16/24  0746      POTASSIUM 3.6   CHLORIDE 97*   BRANDON 10.0   CO2 28   BUN 10.0   CR 1.05*   *       Assessment:    Tolerating radiation therapy well.  All questions and concerns addressed.    Toxicities:  Fatigue: Grade 1: Fatigue relieved by rest  Pain: Grade 0: No toxicity  Diarrhea: Grade 0: No toxicity  Urinary frequency: Grade 0: No toxicity  Urinary tract pain: Grade 0: No toxicity  Urinary urgency: Grade 0: No toxicity  Dermatitis: Grade 1: Faint erythema or dry desquamation    Plan:   Continue current therapy.    Continue with Cavilon for skin care.   GI: no diarrhea currently. Imodium as needed.       Mosaiq chart and setup information reviewed  MVCT/IGRT images checked    Medication Review  Med list reviewed with patient?: Yes  Medication changes: No changes per pt    Educational Topic Discussed  Education Instructions: reviewed SE        Fior Rai MD/PhD  [phone] clinic  Pager [phone]    Please do not send letter to referring physician.         Again, thank you for allowing me to participate in the care of your patient.        Sincerely,        Fior Rai MD

## 2024-09-16 NOTE — PROGRESS NOTES
"[facility] PHYSICIANS  SPECIALIZING IN BREAKTHROUGHS  Radiation Oncology    On Treatment Visit Note      Regina Gaspar      Date: 2024   MRN: [id number]   : 1960  Diagnosis: Vulvar cancer      Reason for Visit:  On Radiation Treatment Visit     Treatment Summary to Date  Treatment Site: Pelvis Current Dose: 3600/6000 (dose painting) cGy Fractions: 20/30      Chemotherapy  Chemo concurrent with radx?: Yes  Oncologist: Kaley  Drug Name/Frequency 1: Ciplatin/weekly    ED Visit/Hospital Admission: None    Treatment Breaks: None (other than planned break on Labor Day)      Subjective:   Regina states that her skin is better this week. She is more diligent with Cavilon use and feels that her skin is less \"wet\" and \"weepy\". She denies any pain in the treated area. She has no diarrhea or bladder irritation.     Nursing ROS:   Nutrition Alteration  Diet Type: Patient's Preference  Anorexia NCI: 0 - None  Nutrition Note: appetite good  Skin  Skin Reaction: 1 - Faint erythema or dry desquamation (per pt, itchy at times.)  Skin Intervention: cavelon cream three times day  Skin Note: MD will check skin on table  CNS Alteration  CNS note: denies problems  ENT and Mouth Exam  Mucositis - Current: 0 - None   Cardiovascular  Respiratory effort: 1 - Normal - without distress  Gastrointestinal  Nausea: 0 - None  Vomitin - No vomiting (ons)  Diarrhea: 0 - None  GI Note: WNL  Genitourinary  Urinary Status: 1 - Increase in frequency or nocturia up to 2x normal (2x/night)  Dysuria: 0 - None  Psychosocial  Mood - Anxiety: 0 - Normal  Mood - Depression: 0 - Normal  Psychosocial Note: staying at [facility], going well  Pain Assessment  0-10 Pain Scale: 0      Objective:   /80 (BP Location: Right arm, Patient Position: Sitting, Cuff Size: Adult Regular)   Pulse 79   Wt 65.3 kg (144 lb)   LMP  (LMP Unknown)   SpO2 97%   BMI 21.90 kg/m    Gen: Appears well, in no acute distress  Skin: " Hyperpigmentation of the vulva and bilateral groin; mild edema of the left labia. No overt breakdown.     Labs:  CBC RESULTS:   Recent Labs   Lab Test 09/16/24  0746   WBC 4.5   RBC 4.62   HGB 15.2   HCT 44.2   MCV 96   MCH 32.9   MCHC 34.4   RDW 11.3        ELECTROLYTES:  Recent Labs   Lab Test 09/16/24  0746      POTASSIUM 3.6   CHLORIDE 97*   BRANDON 10.0   CO2 28   BUN 10.0   CR 1.05*   *       Assessment:    Tolerating radiation therapy well.  All questions and concerns addressed.    Toxicities:  Fatigue: Grade 1: Fatigue relieved by rest  Pain: Grade 0: No toxicity  Diarrhea: Grade 0: No toxicity  Urinary frequency: Grade 0: No toxicity  Urinary tract pain: Grade 0: No toxicity  Urinary urgency: Grade 0: No toxicity  Dermatitis: Grade 1: Faint erythema or dry desquamation    Plan:   Continue current therapy.    Continue with Cavilon for skin care.   GI: no diarrhea currently. Imodium as needed.       Mosaiq chart and setup information reviewed  MVCT/IGRT images checked    Medication Review  Med list reviewed with patient?: Yes  Medication changes: No changes per pt    Educational Topic Discussed  Education Instructions: reviewed SE        Fior Rai MD/PhD  [phone] clinic  Pager [phone]    Please do not send letter to referring physician.

## 2024-09-16 NOTE — PROGRESS NOTES
Infusion Nursing Note:  Regina Gaspar presents today for Cycle 1, Day 29 Cisplatin.    Patient seen by provider today: No   present during visit today: Not Applicable.    Note: Pt assessed upon arrival to infusion suite. Denies fever, chills, cough, SOB or other signs/symptoms of infection. Pt states her appetite has been very good and denies any nausea, vomiting, diarrhea or constipation. Pt denies tinnitus. No new issues or concerns to report.     Intravenous Access:  Peripheral IV placed.    Treatment Conditions:  Lab Results   Component Value Date    HGB 15.2 09/16/2024    WBC 4.5 09/16/2024    ANEUTAUTO 3.0 09/16/2024     09/16/2024        Lab Results   Component Value Date     09/16/2024    POTASSIUM 3.6 09/16/2024    MAG 1.5 (L) 09/16/2024    CR 1.05 (H) 09/16/2024    BRANDON 10.0 09/16/2024    BILITOTAL 0.5 09/16/2024    ALBUMIN 4.5 09/16/2024    ALT 11 09/16/2024    AST 18 09/16/2024     Results reviewed, labs MET treatment parameters, ok to proceed with treatment.  Pt voiding prior to initiating Cisplatin.  Mag 1.5 - replaced per protocol.     Post Infusion Assessment:  Patient tolerated infusion without incident.  Blood return noted pre and post infusion.  Site patent and intact, free from redness, edema or discomfort.  No evidence of extravasations.  Access discontinued per protocol.     Discharge Plan:   Patient declined prescription refills.  AVS to patient via [employer].  Patient will return 9/23/24 for next appointment.   Patient discharged in stable condition accompanied by: self.  Departure Mode: Ambulatory.      Kim Rangel RN

## 2024-09-17 ENCOUNTER — APPOINTMENT (OUTPATIENT)
Dept: RADIATION ONCOLOGY | Facility: CLINIC | Age: 64
End: 2024-09-17
Attending: RADIOLOGY
Payer: MEDICARE

## 2024-09-17 PROCEDURE — 77386 HC IMRT TREATMENT DELIVERY, COMPLEX: CPT | Performed by: RADIOLOGY

## 2024-09-17 PROCEDURE — 77014 PR CT GUIDE FOR PLACEMENT RADIATION THERAPY FIELDS: CPT | Mod: 26 | Performed by: RADIOLOGY

## 2024-09-18 ENCOUNTER — APPOINTMENT (OUTPATIENT)
Dept: RADIATION ONCOLOGY | Facility: CLINIC | Age: 64
End: 2024-09-18
Attending: RADIOLOGY
Payer: MEDICARE

## 2024-09-18 PROCEDURE — 77386 HC IMRT TREATMENT DELIVERY, COMPLEX: CPT | Performed by: RADIOLOGY

## 2024-09-18 PROCEDURE — 77014 PR CT GUIDE FOR PLACEMENT RADIATION THERAPY FIELDS: CPT | Mod: 26 | Performed by: RADIOLOGY

## 2024-09-19 ENCOUNTER — APPOINTMENT (OUTPATIENT)
Dept: RADIATION ONCOLOGY | Facility: CLINIC | Age: 64
End: 2024-09-19
Attending: RADIOLOGY
Payer: MEDICARE

## 2024-09-19 PROCEDURE — 77386 HC IMRT TREATMENT DELIVERY, COMPLEX: CPT | Performed by: RADIOLOGY

## 2024-09-19 PROCEDURE — 77014 PR CT GUIDE FOR PLACEMENT RADIATION THERAPY FIELDS: CPT | Mod: 26 | Performed by: RADIOLOGY

## 2024-09-19 NOTE — PROGRESS NOTES
Virtual Visit Details    Type of service:  Telephone Visit   Phone call duration: 10 minutes   Originating Location (pt. 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  She presents for follow up and disease management by phone.     Oncology History:  3/2024: Initially, she was seen in the ED visit 3/2024 with a vulvar lesion and some early satiety.      3/31/24 CT:  IMPRESSION:  1.  2.3 x 2.3 cm low-density lymph node or possible small fluid collection noted within the right groin. Could consider ultrasound and/or biopsy as clinically indicated.   2.  No other evidence of malignancy within the chest, abdomen, and pelvis.     24: PAP NILM, HPV 16+. Vulva, right, biopsy-  Well differentiated squamous cell carcinoma with keratinization, superficially invasive, incompletely excised    24: PET CT  IMPRESSION:   In this patient with history of vulvar squamous cell carcinoma:  1. FDG avid lesion within the vulva consistent with biopsy-proven  squamous cell carcinoma.  2. 2.8 x 2.5 cm necrotic right inguinal lymph node representing a  metastatic node.  3. 1.2 x 0.6 cm mildly avid right obturator internus/pelvic sidewall  node is indeterminate.  4. No evidence of distant metastasis.     24 Radical local excision, bilateral inguinal and right pelvic LND  A. Vulva. 12 o'clock margin, biopsy:  - Squamous epithelium with reactive changes  - Negative for dysplasia or malignancy  B. Vulva, 3 o'clock margin, biopsy:   - Squamous epithelium with reactive changes  - Negative for dysplasia or malignancy  C. Vulva, 6 o'clock margin, biopsy:  - Squamous epithelium with reactive  "changes  - Negative for dysplasia or malignancy  D. Lymph node, right inguinal femoral, lymphadenectomy:  - METASTATIC SQUAMOUS CELL CARCINOMA in three of six lymph nodes (3/6)  - Largest metastatic deposit measures 3.6 cm  - No extra capsular extension identified  E. Lymph node, left inguinal femoral, lymphadenectomy:  - Two reactive lymph nodes examined, negative for malignancy (0/2)  F. Vulva, right, radical excision:  - INVASIVE KERATINIZING SQUAMOUS CELL CARCINOMA, moderately differentiated, HPV associated  - Size: 4.2 cm  - Depth of invasion 5 mm  - Surgical margins are negative for high grade dysplasia or carcinoma   G. Endocervix, curetting:  - Predominantly mucus with rare fragments of ectocervical and endocervical epithelium with reactive changes  - Negative for dysplasia or malignancy  H. Cervix, random, biopsies:  - Ectocervical and endocervical junctional tissue with reactive epithelial changes  - Negative for dysplasia or malignancy   I. Lymph node, right pelvic, lymphadenectomy:  - Four reactive lymph nodes examined, negative for malignancy (0/4)     Plan: Chemoradiation with weekly cisplatin     8/19/24: Cycle 1 day 1 cisplatin 40 mg/m2.    8/26/24: Cycle 1 day 8 cisplatin Creatinine 2.28.  Cisplatin held and given 1L D5 1/2 NS.  9/3/24: Cycle 1 day 15 cisplatin. Creatinine 2.39.  Cisplatin held and given 2L D5 1/2 NS. Creatinine down to 2.09 after fluids.  Renal US  IMPRESSION:  1.  Normal renal ultrasound without hydronephrosis or suspicious mass.  9/9/24: Cycle 1 day 22 cisplatin 20 mg/m2 due to kidney function. Creatinine 1.26.    9/16/24: Cycle 1 day 29 cisplatin 20 mg/m2  9/23/24: Cycle 1 day 36 cisplatin 30 mg/m2 (if kidney function ok per Dr. Roche) planned.              Today she reports;    -Vaginal bleeding/discharge: No  -Pain: Some pain on the \"bottom\", using cavilan - then no pain  -Nausea or vomiting: No  -Appetite: Good appetite  -Weight loss: Weight has been " stable  -Diarrhea/Constipation: She had one stool that was a little loose yesterday, not needing imodium  -Neuropathy symptoms: No  -Tinnitus: No  -Leg swelling: No  -Fevers: No  -Chest pain: No  -SOB: No  She does usually take a supplement with mag, calcium, and zinc              Past Medical History:    Past Medical History:   Diagnosis Date    Vulvar cancer (H) 2024         Past Surgical History:    Past Surgical History:   Procedure Laterality Date     SECTION      LAPAROSCOPIC LYMPHADENECTOMY N/A 2024    Procedure: Laparoscopic lymphadenectomy;  Surgeon: Javier Roche MD;  Location: [facility] OR    VULVECTOMY RADICAL DISSECT GROIN(S) N/A 2024    Procedure: Radical excision of right vulvar lesion. Removal of lymph nodes from left and right groin. Biopsies of cervix.;  Surgeon: Javier Roche MD;  Location:  OR         Health Maintenance Due   Topic Date Due    DEXA  Never done    ADVANCE CARE PLANNING  Never done    DEPRESSION ACTION PLAN  Never done    MAMMO SCREENING  Never done    Pneumococcal Vaccine: 65+ Years (1 of 2 - PCV) Never done    COLORECTAL CANCER SCREENING  Never done    HIV SCREENING  Never done    HEPATITIS C SCREENING  Never done    ZOSTER IMMUNIZATION (1 of 2) Never done    LIPID  Never done    RSV VACCINE (1 - Risk 60-74 years 1-dose series) Never done    COVID-19 Vaccine (3 - Pfizer risk series) 2021    MEDICARE ANNUAL WELLNESS VISIT  Never done    COLPOSCOPY  Never done    INFLUENZA VACCINE (1) 2024       Current Medications:     Current Outpatient Medications   Medication Sig Dispense Refill    dexAMETHasone (DECADRON) 4 MG tablet Take 2 tablets (8 mg) by mouth daily Take for 3 days, starting the day after chemo on day 2 and 9. Take with food. If no nausea and vomiting with first cisplatin doses, may stop dexamethasone. (Patient not taking: Reported on 2024) 12 tablet 2    ondansetron (ZOFRAN) 8 MG tablet Take 1 tablet (8 mg) by mouth  every 8 hours as needed for nausea (vomiting) Do not take for 3 days after chemo (Patient not taking: Reported on 9/16/2024) 30 tablet 2    prochlorperazine (COMPAZINE) 5 MG tablet Take 1 tablet (5 mg) by mouth every 6 hours as needed for nausea or vomiting (Patient not taking: Reported on 9/16/2024) 30 tablet 2    traZODone (DESYREL) 100 MG tablet Take 1-2 tablets (100-200 mg) by mouth at bedtime 180 tablet 1         Allergies:      No Known Allergies     Social History:     Social History     Tobacco Use    Smoking status: Former     Types: Cigarettes     Passive exposure: Past    Smokeless tobacco: Never    Tobacco comments:     50 pack years. 8/8/24, has not had a cigarette in a couple weeks   Substance Use Topics    Alcohol use: Yes     Comment: quart vodka/ week       History   Drug Use Unknown         Family History:     The patient's family history is notable for:    Family History   Problem Relation Age of Onset    Breast Cancer Mother         has had a couple times    Colon Cancer Father 80    Uterine Cancer No family hx of          Physical Exam:     LMP  (LMP Unknown)   There is no height or weight on file to calculate BMI.    Respiratory: No audible wheeze, cough.      Psychiatric: appropriate mood and affect. Mentation appears normal, affect normal/bright, judgement and insight intact, normal speech       The rest of a comprehensive physical examination is deferred due phone visit restrictions.      Recent Results (from the past 168 hour(s))   Comprehensive metabolic panel   Result Value Ref Range    Sodium 139 135 - 145 mmol/L    Potassium 3.6 3.4 - 5.3 mmol/L    Carbon Dioxide (CO2) 28 22 - 29 mmol/L    Anion Gap 14 7 - 15 mmol/L    Urea Nitrogen 10.0 8.0 - 23.0 mg/dL    Creatinine 1.05 (H) 0.51 - 0.95 mg/dL    GFR Estimate 59 (L) >60 mL/min/1.73m2    Calcium 10.0 8.8 - 10.4 mg/dL    Chloride 97 (L) 98 - 107 mmol/L    Glucose 115 (H) 70 - 99 mg/dL    Alkaline Phosphatase 71 40 - 150 U/L    AST 18 0 -  45 U/L    ALT 11 0 - 50 U/L    Protein Total 7.6 6.4 - 8.3 g/dL    Albumin 4.5 3.5 - 5.2 g/dL    Bilirubin Total 0.5 <=1.2 mg/dL   Magnesium   Result Value Ref Range    Magnesium 1.5 (L) 1.7 - 2.3 mg/dL   CBC with platelets and differential   Result Value Ref Range    WBC Count 4.5 4.0 - 11.0 10e3/uL    RBC Count 4.62 3.80 - 5.20 10e6/uL    Hemoglobin 15.2 11.7 - 15.7 g/dL    Hematocrit 44.2 35.0 - 47.0 %    MCV 96 78 - 100 fL    MCH 32.9 26.5 - 33.0 pg    MCHC 34.4 31.5 - 36.5 g/dL    RDW 11.3 10.0 - 15.0 %    Platelet Count 172 150 - 450 10e3/uL    % Neutrophils 66 %    % Lymphocytes 18 %    % Monocytes 12 %    % Eosinophils 3 %    % Basophils 1 %    % Immature Granulocytes 0 %    NRBCs per 100 WBC 0 <1 /100    Absolute Neutrophils 3.0 1.6 - 8.3 10e3/uL    Absolute Lymphocytes 0.8 0.8 - 5.3 10e3/uL    Absolute Monocytes 0.5 0.0 - 1.3 10e3/uL    Absolute Eosinophils 0.2 0.0 - 0.7 10e3/uL    Absolute Basophils 0.0 0.0 - 0.2 10e3/uL    Absolute Immature Granulocytes 0.0 <=0.4 10e3/uL    Absolute NRBCs 0.0 10e3/uL          Assessment:    Regina Gaspar is a 64 year old woman with a diagnosis of 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  She presents for follow up and disease management by phone.     10 minutes spent on the date of the encounter doing chart review, history and exam, documentation, and further activities as noted above.      Plan:     1.)        Vulvar cancer:  OK to proceed with planned treatment as scheduled if labs are WDL.  Discussed patient with Dr. Roche and cisplatin dose increased to 30 mg/m2 for week 36 if creatinine is WDL.  After Monday she will be completed with the chemotherapy portion of her treatment.  RTC 1 month after completing radiation for a symptom assessment visit; message sent for scheduling.  Discussed that she will need imaging and follow up with Dr Coffman Cove 3 months after completing treatment.  Reviewed signs and symptoms for when she should contact the  clinic or seek additional care.  Patient to contact the clinic with any questions or concerns in the interim.      Genetic risk factors were assessed and she does not meet qualification for referral.     Labs and/or tests reviewed include:  CBC. CMP. Mag.                2.)        Health maintenance:  Issues addressed today include following up with PCP for annual health maintenance and non-gynecologic issues.      3.)        Elevated creatinine: Creatinine Monday was 1.05.  Renal US was WDL.  She received cisplatin 20 mg/m2 this week.  Per Dr. Roche if creatinine meets parameters ok to proceed with cisplatin 30 mg/m2.  She should continue to push fluids as she has been.    4.) Hypomagnesemia: Magnesium 1.5 Monday, 1.6 last week.  No significant diarrhea and her cisplatin overall has been very reduced all treatment.  Will recheck with treatment on Monday and if low will plan for PO mag ox 400 mg daily at home for 2 weeks.    Addendum: Magnesium 1.2.  Replace today in infusion and start mag oxide 400 mg daily x 30 days at home prescription sent to pharmacy.      Silvana Marin, DNP, APRN, FNP-C, AOCNP  Oncology Nurse Practitioner  Division of Gynecologic Oncology  Pager: [phone]     CC  Patient Care Team:  Brooke Li PA-C as PCP - General (Family Medicine)  Carolina Hernandez DO as Physician (OB/Gyn)  Javier Roche MD as MD (Gynecologic Oncology)  Carolina Hernandez DO as Assigned OBGYN Provider  Daquan Cramer, RN as Specialty Care Coordinator (Hematology & Oncology)  Caio Jimenez MD as Physician (Radiation Oncology)  Everton Jimenez MD as MD (Radiation Oncology)  Brooke Li PA-C as Assigned PCP  Fior Rai MD as Assigned Cancer Care Provider  SELF, REFERRED

## 2024-09-20 ENCOUNTER — APPOINTMENT (OUTPATIENT)
Dept: RADIATION ONCOLOGY | Facility: CLINIC | Age: 64
End: 2024-09-20
Attending: RADIOLOGY
Payer: MEDICARE

## 2024-09-20 ENCOUNTER — VIRTUAL VISIT (OUTPATIENT)
Dept: ONCOLOGY | Facility: CLINIC | Age: 64
End: 2024-09-20
Attending: NURSE PRACTITIONER
Payer: MEDICARE

## 2024-09-20 DIAGNOSIS — E83.42 HYPOMAGNESEMIA: ICD-10-CM

## 2024-09-20 DIAGNOSIS — C51.9 VULVAR CANCER (H): Primary | ICD-10-CM

## 2024-09-20 DIAGNOSIS — C51.9 MALIGNANT NEOPLASM OF VULVA (H): ICD-10-CM

## 2024-09-20 PROCEDURE — 77014 PR CT GUIDE FOR PLACEMENT RADIATION THERAPY FIELDS: CPT | Mod: 26 | Performed by: RADIOLOGY

## 2024-09-20 PROCEDURE — 99441 PR PHYSICIAN TELEPHONE EVALUATION 5-10 MIN: CPT | Mod: 24 | Performed by: NURSE PRACTITIONER

## 2024-09-20 PROCEDURE — 77386 HC IMRT TREATMENT DELIVERY, COMPLEX: CPT | Performed by: RADIOLOGY

## 2024-09-20 PROCEDURE — 77300 RADIATION THERAPY DOSE PLAN: CPT | Performed by: RADIOLOGY

## 2024-09-20 PROCEDURE — 77338 DESIGN MLC DEVICE FOR IMRT: CPT | Mod: XU | Performed by: RADIOLOGY

## 2024-09-20 PROCEDURE — 77338 DESIGN MLC DEVICE FOR IMRT: CPT | Mod: 26 | Performed by: RADIOLOGY

## 2024-09-20 PROCEDURE — 77336 RADIATION PHYSICS CONSULT: CPT | Performed by: RADIOLOGY

## 2024-09-20 PROCEDURE — 77300 RADIATION THERAPY DOSE PLAN: CPT | Mod: 26 | Performed by: RADIOLOGY

## 2024-09-20 NOTE — LETTER
2024      Regina Gaspar  [address]  [facility] [address]      Dear Colleague,    Thank you for referring your patient, Regina Gaspar, to the [facility]. Please see a copy of my visit note below.    Virtual Visit Details    Type of service:  Telephone Visit   Phone call duration: 10 minutes   Originating Location (pt. 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  She presents for follow up and disease management by phone.     Oncology History:  3/2024: Initially, she was seen in the ED visit 3/2024 with a vulvar lesion and some early satiety.      3/31/24 CT:  IMPRESSION:  1.  2.3 x 2.3 cm low-density lymph node or possible small fluid collection noted within the right groin. Could consider ultrasound and/or biopsy as clinically indicated.   2.  No other evidence of malignancy within the chest, abdomen, and pelvis.     24: PAP NILM, HPV 16+. Vulva, right, biopsy-  Well differentiated squamous cell carcinoma with keratinization, superficially invasive, incompletely excised    24: PET CT  IMPRESSION:   In this patient with history of vulvar squamous cell carcinoma:  1. FDG avid lesion within the vulva consistent with biopsy-proven  squamous cell carcinoma.  2. 2.8 x 2.5 cm necrotic right inguinal lymph node representing a  metastatic node.  3. 1.2 x 0.6 cm mildly avid right obturator internus/pelvic sidewall  node is indeterminate.  4. No evidence of distant metastasis.     24 Radical local excision, bilateral inguinal and right pelvic LND  A. Vulva. 12 o'clock margin, biopsy:  - Squamous epithelium with reactive  changes  - Negative for dysplasia or malignancy  B. Vulva, 3 o'clock margin, biopsy:   - Squamous epithelium with reactive changes  - Negative for dysplasia or malignancy  C. Vulva, 6 o'clock margin, biopsy:  - Squamous epithelium with reactive changes  - Negative for dysplasia or malignancy  D. Lymph node, right inguinal femoral, lymphadenectomy:  - METASTATIC SQUAMOUS CELL CARCINOMA in three of six lymph nodes (3/6)  - Largest metastatic deposit measures 3.6 cm  - No extra capsular extension identified  E. Lymph node, left inguinal femoral, lymphadenectomy:  - Two reactive lymph nodes examined, negative for malignancy (0/2)  F. Vulva, right, radical excision:  - INVASIVE KERATINIZING SQUAMOUS CELL CARCINOMA, moderately differentiated, HPV associated  - Size: 4.2 cm  - Depth of invasion 5 mm  - Surgical margins are negative for high grade dysplasia or carcinoma   G. Endocervix, curetting:  - Predominantly mucus with rare fragments of ectocervical and endocervical epithelium with reactive changes  - Negative for dysplasia or malignancy  H. Cervix, random, biopsies:  - Ectocervical and endocervical junctional tissue with reactive epithelial changes  - Negative for dysplasia or malignancy   I. Lymph node, right pelvic, lymphadenectomy:  - Four reactive lymph nodes examined, negative for malignancy (0/4)     Plan: Chemoradiation with weekly cisplatin     8/19/24: Cycle 1 day 1 cisplatin 40 mg/m2.    8/26/24: Cycle 1 day 8 cisplatin Creatinine 2.28.  Cisplatin held and given 1L D5 1/2 NS.  9/3/24: Cycle 1 day 15 cisplatin. Creatinine 2.39.  Cisplatin held and given 2L D5 1/2 NS. Creatinine down to 2.09 after fluids.  Renal US  IMPRESSION:  1.  Normal renal ultrasound without hydronephrosis or suspicious mass.  9/9/24: Cycle 1 day 22 cisplatin 20 mg/m2 due to kidney function. Creatinine 1.26.    9/16/24: Cycle 1 day 29 cisplatin 20 mg/m2  9/23/24: Cycle 1 day 36 cisplatin 30 mg/m2 (if kidney function ok per Dr. Roche)  "planned.              Today she reports;    -Vaginal bleeding/discharge: No  -Pain: Some pain on the \"bottom\", using cavilan - then no pain  -Nausea or vomiting: No  -Appetite: Good appetite  -Weight loss: Weight has been stable  -Diarrhea/Constipation: She had one stool that was a little loose yesterday, not needing imodium  -Neuropathy symptoms: No  -Tinnitus: No  -Leg swelling: No  -Fevers: No  -Chest pain: No  -SOB: No  She does usually take a supplement with mag, calcium, and zinc              Past Medical History:    Past Medical History:   Diagnosis Date     Vulvar cancer (H) 2024         Past Surgical History:    Past Surgical History:   Procedure Laterality Date      SECTION       LAPAROSCOPIC LYMPHADENECTOMY N/A 2024    Procedure: Laparoscopic lymphadenectomy;  Surgeon: Javier Roche MD;  Location: [facility] OR     VULVECTOMY RADICAL DISSECT GROIN(S) N/A 2024    Procedure: Radical excision of right vulvar lesion. Removal of lymph nodes from left and right groin. Biopsies of cervix.;  Surgeon: Javier Roche MD;  Location:  OR         Health Maintenance Due   Topic Date Due     DEXA  Never done     ADVANCE CARE PLANNING  Never done     DEPRESSION ACTION PLAN  Never done     MAMMO SCREENING  Never done     Pneumococcal Vaccine: 65+ Years (1 of 2 - PCV) Never done     COLORECTAL CANCER SCREENING  Never done     HIV SCREENING  Never done     HEPATITIS C SCREENING  Never done     ZOSTER IMMUNIZATION (1 of 2) Never done     LIPID  Never done     RSV VACCINE (1 - Risk 60-74 years 1-dose series) Never done     COVID-19 Vaccine (3 - Pfizer risk series) 2021     MEDICARE ANNUAL WELLNESS VISIT  Never done     COLPOSCOPY  Never done     INFLUENZA VACCINE (1) 2024       Current Medications:     Current Outpatient Medications   Medication Sig Dispense Refill     dexAMETHasone (DECADRON) 4 MG tablet Take 2 tablets (8 mg) by mouth daily Take for 3 days, starting the day " after chemo on day 2 and 9. Take with food. If no nausea and vomiting with first cisplatin doses, may stop dexamethasone. (Patient not taking: Reported on 9/16/2024) 12 tablet 2     ondansetron (ZOFRAN) 8 MG tablet Take 1 tablet (8 mg) by mouth every 8 hours as needed for nausea (vomiting) Do not take for 3 days after chemo (Patient not taking: Reported on 9/16/2024) 30 tablet 2     prochlorperazine (COMPAZINE) 5 MG tablet Take 1 tablet (5 mg) by mouth every 6 hours as needed for nausea or vomiting (Patient not taking: Reported on 9/16/2024) 30 tablet 2     traZODone (DESYREL) 100 MG tablet Take 1-2 tablets (100-200 mg) by mouth at bedtime 180 tablet 1         Allergies:      No Known Allergies     Social History:     Social History     Tobacco Use     Smoking status: Former     Types: Cigarettes     Passive exposure: Past     Smokeless tobacco: Never     Tobacco comments:     50 pack years. 8/8/24, has not had a cigarette in a couple weeks   Substance Use Topics     Alcohol use: Yes     Comment: gerald sanches/ week       History   Drug Use Unknown         Family History:     The patient's family history is notable for:    Family History   Problem Relation Age of Onset     Breast Cancer Mother         has had a couple times     Colon Cancer Father 80     Uterine Cancer No family hx of          Physical Exam:     LMP  (LMP Unknown)   There is no height or weight on file to calculate BMI.    Respiratory: No audible wheeze, cough.      Psychiatric: appropriate mood and affect. Mentation appears normal, affect normal/bright, judgement and insight intact, normal speech       The rest of a comprehensive physical examination is deferred due phone visit restrictions.      Recent Results (from the past 168 hour(s))   Comprehensive metabolic panel   Result Value Ref Range    Sodium 139 135 - 145 mmol/L    Potassium 3.6 3.4 - 5.3 mmol/L    Carbon Dioxide (CO2) 28 22 - 29 mmol/L    Anion Gap 14 7 - 15 mmol/L    Urea Nitrogen 10.0  8.0 - 23.0 mg/dL    Creatinine 1.05 (H) 0.51 - 0.95 mg/dL    GFR Estimate 59 (L) >60 mL/min/1.73m2    Calcium 10.0 8.8 - 10.4 mg/dL    Chloride 97 (L) 98 - 107 mmol/L    Glucose 115 (H) 70 - 99 mg/dL    Alkaline Phosphatase 71 40 - 150 U/L    AST 18 0 - 45 U/L    ALT 11 0 - 50 U/L    Protein Total 7.6 6.4 - 8.3 g/dL    Albumin 4.5 3.5 - 5.2 g/dL    Bilirubin Total 0.5 <=1.2 mg/dL   Magnesium   Result Value Ref Range    Magnesium 1.5 (L) 1.7 - 2.3 mg/dL   CBC with platelets and differential   Result Value Ref Range    WBC Count 4.5 4.0 - 11.0 10e3/uL    RBC Count 4.62 3.80 - 5.20 10e6/uL    Hemoglobin 15.2 11.7 - 15.7 g/dL    Hematocrit 44.2 35.0 - 47.0 %    MCV 96 78 - 100 fL    MCH 32.9 26.5 - 33.0 pg    MCHC 34.4 31.5 - 36.5 g/dL    RDW 11.3 10.0 - 15.0 %    Platelet Count 172 150 - 450 10e3/uL    % Neutrophils 66 %    % Lymphocytes 18 %    % Monocytes 12 %    % Eosinophils 3 %    % Basophils 1 %    % Immature Granulocytes 0 %    NRBCs per 100 WBC 0 <1 /100    Absolute Neutrophils 3.0 1.6 - 8.3 10e3/uL    Absolute Lymphocytes 0.8 0.8 - 5.3 10e3/uL    Absolute Monocytes 0.5 0.0 - 1.3 10e3/uL    Absolute Eosinophils 0.2 0.0 - 0.7 10e3/uL    Absolute Basophils 0.0 0.0 - 0.2 10e3/uL    Absolute Immature Granulocytes 0.0 <=0.4 10e3/uL    Absolute NRBCs 0.0 10e3/uL          Assessment:    Regina Gaspar is a 64 year old woman with a diagnosis of 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  She presents for follow up and disease management by phone.     10 minutes spent on the date of the encounter doing chart review, history and exam, documentation, and further activities as noted above.      Plan:     1.)        Vulvar cancer:  OK to proceed with planned treatment as scheduled if labs are WDL.  Discussed patient with Dr. Roche and cisplatin dose increased to 30 mg/m2 for week 36 if creatinine is WDL.  After Monday she will be completed with the chemotherapy portion of her treatment.  RTC 1 month  after completing radiation for a symptom assessment visit; message sent for scheduling.  Discussed that she will need imaging and follow up with Dr Roche 3 months after completing treatment.  Reviewed signs and symptoms for when she should contact the clinic or seek additional care.  Patient to contact the clinic with any questions or concerns in the interim.      Genetic risk factors were assessed and she does not meet qualification for referral.     Labs and/or tests reviewed include:  CBC. CMP. Mag.                2.)        Health maintenance:  Issues addressed today include following up with PCP for annual health maintenance and non-gynecologic issues.      3.)        Elevated creatinine: Creatinine Monday was 1.05.  Renal US was WDL.  She received cisplatin 20 mg/m2 this week.  Per Dr. Roche if creatinine meets parameters ok to proceed with cisplatin 30 mg/m2.  She should continue to push fluids as she has been.    4.) Hypomagnesemia: Magnesium 1.5 Monday, 1.6 last week.  No significant diarrhea and her cisplatin overall has been very reduced all treatment.  Will recheck with treatment on Monday and if low will plan for PO mag ox 400 mg daily at home for 2 weeks.      Silvana Marin, DNP, APRN, FNP-C, AOCNP  Oncology Nurse Practitioner  Division of Gynecologic Oncology  Pager: [phone]     CC  Patient Care Team:  Brooke Li PA-C as PCP - General (Family Medicine)  Carolina Hernandez DO as Physician (OB/Gyn)  Javier Roche MD as MD (Gynecologic Oncology)  Carolina Hernandez DO as Assigned OBGYN Provider  Daquan Cramer, RN as Specialty Care Coordinator (Hematology & Oncology)  Caio Jimenez MD as Physician (Radiation Oncology)  Everton Jimenez MD as MD (Radiation Oncology)  Brooke Li PA-C as Assigned PCP  Fior Rai MD as Assigned Cancer Care Provider  SELF, REFERRED      Again, thank you for allowing me to participate in the care of your patient.         Sincerely,        CUCO Teran CNP

## 2024-09-20 NOTE — NURSING NOTE
[facility]    Is the patient currently in the state of [place]? YES    Visit mode:TELEPHONE    If the visit is dropped, the patient can be reconnected by: TELEPHONE VISIT: Phone number:   Telephone Information:   Mobile [phone]       Will anyone else be joining the visit? NO  (If patient encounters technical issues they should call [phone])    How would you like to obtain your AVS? MyChart    Are changes needed to the allergy or medication list? Pt stated no changes to allergies and Pt stated no med changes    Are refills needed on medications prescribed by this physician? NO    Rooming Documentation:  Questionnaire(s) completed    Reason for visit: RECHECK    Gerri ONEIL

## 2024-09-23 ENCOUNTER — OFFICE VISIT (OUTPATIENT)
Dept: RADIATION ONCOLOGY | Facility: CLINIC | Age: 64
End: 2024-09-23
Attending: RADIOLOGY
Payer: MEDICARE

## 2024-09-23 ENCOUNTER — INFUSION THERAPY VISIT (OUTPATIENT)
Dept: ONCOLOGY | Facility: CLINIC | Age: 64
End: 2024-09-23
Attending: OBSTETRICS & GYNECOLOGY
Payer: MEDICARE

## 2024-09-23 ENCOUNTER — APPOINTMENT (OUTPATIENT)
Dept: LAB | Facility: CLINIC | Age: 64
End: 2024-09-23
Payer: MEDICARE

## 2024-09-23 VITALS
TEMPERATURE: 98.3 F | BODY MASS INDEX: 22.21 KG/M2 | HEART RATE: 73 BPM | SYSTOLIC BLOOD PRESSURE: 128 MMHG | DIASTOLIC BLOOD PRESSURE: 83 MMHG | RESPIRATION RATE: 18 BRPM | OXYGEN SATURATION: 99 % | WEIGHT: 146.1 LBS

## 2024-09-23 VITALS — HEART RATE: 64 BPM | DIASTOLIC BLOOD PRESSURE: 73 MMHG | OXYGEN SATURATION: 98 % | SYSTOLIC BLOOD PRESSURE: 113 MMHG

## 2024-09-23 DIAGNOSIS — C51.9 VULVAR CANCER (H): Primary | ICD-10-CM

## 2024-09-23 LAB
ALBUMIN SERPL BCG-MCNC: 4.1 G/DL (ref 3.5–5.2)
ALP SERPL-CCNC: 70 U/L (ref 40–150)
ALT SERPL W P-5'-P-CCNC: 8 U/L (ref 0–50)
ANION GAP SERPL CALCULATED.3IONS-SCNC: 10 MMOL/L (ref 7–15)
AST SERPL W P-5'-P-CCNC: 12 U/L (ref 0–45)
BASOPHILS # BLD AUTO: 0 10E3/UL (ref 0–0.2)
BASOPHILS NFR BLD AUTO: 1 %
BILIRUB SERPL-MCNC: 0.4 MG/DL
BUN SERPL-MCNC: 8.8 MG/DL (ref 8–23)
CALCIUM SERPL-MCNC: 9.6 MG/DL (ref 8.8–10.4)
CHLORIDE SERPL-SCNC: 101 MMOL/L (ref 98–107)
CREAT SERPL-MCNC: 0.85 MG/DL (ref 0.51–0.95)
EGFRCR SERPLBLD CKD-EPI 2021: 76 ML/MIN/1.73M2
EOSINOPHIL # BLD AUTO: 0.3 10E3/UL (ref 0–0.7)
EOSINOPHIL NFR BLD AUTO: 4 %
ERYTHROCYTE [DISTWIDTH] IN BLOOD BY AUTOMATED COUNT: 11.2 % (ref 10–15)
GLUCOSE SERPL-MCNC: 111 MG/DL (ref 70–99)
HCO3 SERPL-SCNC: 28 MMOL/L (ref 22–29)
HCT VFR BLD AUTO: 39 % (ref 35–47)
HGB BLD-MCNC: 13.7 G/DL (ref 11.7–15.7)
IMM GRANULOCYTES # BLD: 0 10E3/UL
IMM GRANULOCYTES NFR BLD: 1 %
LYMPHOCYTES # BLD AUTO: 0.7 10E3/UL (ref 0.8–5.3)
LYMPHOCYTES NFR BLD AUTO: 11 %
MAGNESIUM SERPL-MCNC: 1.2 MG/DL (ref 1.7–2.3)
MCH RBC QN AUTO: 33.2 PG (ref 26.5–33)
MCHC RBC AUTO-ENTMCNC: 35.1 G/DL (ref 31.5–36.5)
MCV RBC AUTO: 94 FL (ref 78–100)
MONOCYTES # BLD AUTO: 0.5 10E3/UL (ref 0–1.3)
MONOCYTES NFR BLD AUTO: 8 %
NEUTROPHILS # BLD AUTO: 4.9 10E3/UL (ref 1.6–8.3)
NEUTROPHILS NFR BLD AUTO: 76 %
NRBC # BLD AUTO: 0 10E3/UL
NRBC BLD AUTO-RTO: 0 /100
PLATELET # BLD AUTO: 204 10E3/UL (ref 150–450)
POTASSIUM SERPL-SCNC: 3.7 MMOL/L (ref 3.4–5.3)
PROT SERPL-MCNC: 6.8 G/DL (ref 6.4–8.3)
RBC # BLD AUTO: 4.13 10E6/UL (ref 3.8–5.2)
SODIUM SERPL-SCNC: 139 MMOL/L (ref 135–145)
WBC # BLD AUTO: 6.4 10E3/UL (ref 4–11)

## 2024-09-23 PROCEDURE — 250N000011 HC RX IP 250 OP 636: Performed by: NURSE PRACTITIONER

## 2024-09-23 PROCEDURE — 96375 TX/PRO/DX INJ NEW DRUG ADDON: CPT

## 2024-09-23 PROCEDURE — 36415 COLL VENOUS BLD VENIPUNCTURE: CPT | Performed by: OBSTETRICS & GYNECOLOGY

## 2024-09-23 PROCEDURE — 77386 HC IMRT TREATMENT DELIVERY, COMPLEX: CPT | Performed by: RADIOLOGY

## 2024-09-23 PROCEDURE — 250N000011 HC RX IP 250 OP 636: Performed by: OBSTETRICS & GYNECOLOGY

## 2024-09-23 PROCEDURE — 83735 ASSAY OF MAGNESIUM: CPT | Performed by: OBSTETRICS & GYNECOLOGY

## 2024-09-23 PROCEDURE — 80053 COMPREHEN METABOLIC PANEL: CPT | Performed by: OBSTETRICS & GYNECOLOGY

## 2024-09-23 PROCEDURE — 85025 COMPLETE CBC W/AUTO DIFF WBC: CPT | Performed by: OBSTETRICS & GYNECOLOGY

## 2024-09-23 PROCEDURE — 96413 CHEMO IV INFUSION 1 HR: CPT

## 2024-09-23 PROCEDURE — 258N000003 HC RX IP 258 OP 636: Performed by: NURSE PRACTITIONER

## 2024-09-23 PROCEDURE — 258N000003 HC RX IP 258 OP 636: Performed by: OBSTETRICS & GYNECOLOGY

## 2024-09-23 PROCEDURE — 96367 TX/PROPH/DG ADDL SEQ IV INF: CPT

## 2024-09-23 PROCEDURE — 96361 HYDRATE IV INFUSION ADD-ON: CPT

## 2024-09-23 RX ORDER — DEXTROSE, SODIUM CHLORIDE, AND POTASSIUM CHLORIDE 5; .45; .15 G/100ML; G/100ML; G/100ML
2000 INJECTION INTRAVENOUS ONCE
Status: COMPLETED | OUTPATIENT
Start: 2024-09-23 | End: 2024-09-23

## 2024-09-23 RX ORDER — MAGNESIUM SULFATE HEPTAHYDRATE 40 MG/ML
2 INJECTION, SOLUTION INTRAVENOUS ONCE
Status: COMPLETED | OUTPATIENT
Start: 2024-09-23 | End: 2024-09-23

## 2024-09-23 RX ORDER — MAGNESIUM OXIDE 400 MG/1
400 TABLET ORAL DAILY
Qty: 30 TABLET | Refills: 0 | Status: SHIPPED | OUTPATIENT
Start: 2024-09-23

## 2024-09-23 RX ORDER — PALONOSETRON 0.05 MG/ML
0.25 INJECTION, SOLUTION INTRAVENOUS ONCE
Status: COMPLETED | OUTPATIENT
Start: 2024-09-23 | End: 2024-09-23

## 2024-09-23 RX ADMIN — PALONOSETRON HYDROCHLORIDE 0.25 MG: 0.25 INJECTION INTRAVENOUS at 09:23

## 2024-09-23 RX ADMIN — POTASSIUM CHLORIDE, DEXTROSE MONOHYDRATE AND SODIUM CHLORIDE 2000 ML: 150; 5; 450 INJECTION, SOLUTION INTRAVENOUS at 08:32

## 2024-09-23 RX ADMIN — MAGNESIUM SULFATE 2 G: 2 INJECTION INTRAVENOUS at 11:18

## 2024-09-23 RX ADMIN — FOSAPREPITANT: 150 INJECTION, POWDER, LYOPHILIZED, FOR SOLUTION INTRAVENOUS at 09:26

## 2024-09-23 RX ADMIN — CISPLATIN 53 MG: 1 INJECTION, SOLUTION INTRAVENOUS at 10:11

## 2024-09-23 ASSESSMENT — PAIN SCALES - GENERAL: PAINLEVEL: NO PAIN (0)

## 2024-09-23 NOTE — PROGRESS NOTES
"Infusion Nursing Note:  Regina Gaspar presents today for Cycle 1 Day 36 Cisplatin.    Patient seen by provider today: No   present during visit today: Not Applicable.    Note: Patient presents to infusion today doing well. Denies any changes or concerns since visit with Silvana Marin NP on 9/20.    Magnesium low today at 1.2. 2g IV Magnesium given per electrolyte replacement protocol.     TORB @ 0945 Silvana Marin NP/ Angelique Alvarado RN:  - Sending a prescription for home magnesium downstairs for 30 days    Intravenous Access:  Peripheral IV placed.    Treatment Conditions:  Lab Results   Component Value Date    HGB 13.7 09/23/2024    WBC 6.4 09/23/2024    ANEUTAUTO 4.9 09/23/2024     09/23/2024        Lab Results   Component Value Date     09/23/2024    POTASSIUM 3.7 09/23/2024    MAG 1.2 (L) 09/23/2024    CR 0.85 09/23/2024    BRANDON 9.6 09/23/2024    BILITOTAL 0.4 09/23/2024    ALBUMIN 4.1 09/23/2024    ALT 8 09/23/2024    AST 12 09/23/2024     Results reviewed, labs MET treatment parameters, ok to proceed with treatment.  Patient voided pre and post Cisplatin.     Post Infusion Assessment:  Patient tolerated infusion without incident.  Blood return noted pre and post infusion.  Site patent and intact, free from redness, edema or discomfort.  No evidence of extravasations.  Access discontinued per protocol.     Discharge Plan:   Prescription refills given for Magnesium.  Discharge instructions reviewed with: Patient.  Patient and/or family verbalized understanding of discharge instructions and all questions answered.  Copy of AVS reviewed with patient and/or family. Next appointment not yet scheduled at time of discharge. Per Silvana Marin's last note, \"RTC 1 month after completing radiation for a symptom assessment visit; message sent for scheduling\". Patient aware to call if she does not hear anything.   Patient discharged in stable condition accompanied by: self.  Departure Mode: " Ambulatory.      Ami Alvarado RN

## 2024-09-23 NOTE — PROGRESS NOTES
[facility] PHYSICIANS  SPECIALIZING IN BREAKTHROUGHS  Radiation Oncology    On Treatment Visit Note      Regina Gaspar      Date: 2024   MRN: [id number]   : 1960  Diagnosis: Vulvar cancer      Reason for Visit:  On Radiation Treatment Visit     Treatment Summary to Date  Treatment Site: Pelvis Current Dose: 5000/6000 (dose painting) cGy Fractions: 25/30      Chemotherapy  Chemo concurrent with radx?: Yes  Oncologist: Kaley  Drug Name/Frequency 1: Ciplatin/weekly    ED Visit/Hospital Admission: None    Treatment Breaks: None (other than planned break on Labor Day)    Subjective:   Regina states that her skin is better this week after continuing to use Cavilon throughout the day. She has itching in her groin. She denies any pain in the treated area. She has no diarrhea or bladder irritation.     Nursing ROS:   Nutrition Alteration  Diet Type: Patient's Preference  Nutrition Note: appetite good  Skin  Skin Note: MD will check skin on table        Cardiovascular  Respiratory effort: 1 - Normal - without distress  Gastrointestinal  GI Note: WNL  Genitourinary   Note: WNL  Psychosocial  Psychosocial Note: staying at [facility], going well  Pain Assessment  0-10 Pain Scale: 0       Objective:   /73   Pulse 64   LMP  (LMP Unknown)   SpO2 98%   Gen: Appears well, in no acute distress  Skin: Hyperpigmentation of the vulva and bilateral groin; mild edema of the left labia. No overt breakdown.     Labs:  CBC RESULTS:   Recent Labs   Lab Test 24  0746   WBC 4.5   RBC 4.62   HGB 15.2   HCT 44.2   MCV 96   MCH 32.9   MCHC 34.4   RDW 11.3        ELECTROLYTES:  Recent Labs   Lab Test 24  0746      POTASSIUM 3.6   CHLORIDE 97*   BRANDON 10.0   CO2 28   BUN 10.0   CR 1.05*   *       Assessment:    Tolerating radiation therapy well.  All questions and concerns addressed.    Toxicities:  Fatigue: Grade 1: Fatigue relieved by rest  Pain: Grade 0: No  toxicity  Diarrhea: Grade 0: No toxicity  Urinary frequency: Grade 0: No toxicity  Urinary tract pain: Grade 0: No toxicity  Urinary urgency: Grade 0: No toxicity  Dermatitis: Grade 1: Faint erythema or dry desquamation    Plan:   Continue current therapy.    Continue with Cavilon for skin care.   GI: no diarrhea currently. Imodium as needed.       Mosaiq chart and setup information reviewed  MVCT/IGRT images checked    Medication Review  Med list reviewed with patient?: Yes  Medication changes: No changes per pt    Educational Topic Discussed  Education Instructions: reviewed SE    Daija Strange MD PGY5      I saw and examined the patient with the resident.  I have reviewed and edited the resident's note and agree with the plan of care.         Fior Rai MD/PhD  [phone] clinic  Pager [phone]    Please do not send letter to referring physician.

## 2024-09-23 NOTE — NURSING NOTE
Chief Complaint   Patient presents with    Blood Draw     Labs drawn via PIV by RN in lab, vitals taken.       Labs drawn from PIV placed by RN. Line flushed with saline. Vitals taken. HR running 115, no symptoms, update sent to team. Pt checked in for appointment(s).    Gissel Brice RN

## 2024-09-23 NOTE — LETTER
2024      Regina Gaspar  [address]  [facility] [address]      Dear Colleague,    Thank you for referring your patient, Regina Gaspar, to the [facility] RADIATION ONCOLOGY. Please see a copy of my visit note below.    [facility] PHYSICIANS  SPECIALIZING IN BREAKTHROUGHS  Radiation Oncology    On Treatment Visit Note      Regina Gaspar      Date: 2024   MRN: [id number]   : 1960  Diagnosis: Vulvar cancer      Reason for Visit:  On Radiation Treatment Visit     Treatment Summary to Date  Treatment Site: Pelvis Current Dose: 5000/6000 (dose painting) cGy Fractions: 25/30      Chemotherapy  Chemo concurrent with radx?: Yes  Oncologist: Kaley  Drug Name/Frequency 1: Ciplatin/weekly    ED Visit/Hospital Admission: None    Treatment Breaks: None (other than planned break on Labor Day)    Subjective:   Regina states that her skin is better this week after continuing to use Cavilon throughout the day. She has itching in her groin. She denies any pain in the treated area. She has no diarrhea or bladder irritation.     Nursing ROS:   Nutrition Alteration  Diet Type: Patient's Preference  Nutrition Note: appetite good  Skin  Skin Note: MD will check skin on table        Cardiovascular  Respiratory effort: 1 - Normal - without distress  Gastrointestinal  GI Note: WNL  Genitourinary   Note: WNL  Psychosocial  Psychosocial Note: staying at [facility], going well  Pain Assessment  0-10 Pain Scale: 0       Objective:   /73   Pulse 64   LMP  (LMP Unknown)   SpO2 98%   Gen: Appears well, in no acute distress  Skin: Hyperpigmentation of the vulva and bilateral groin; mild edema of the left labia. No overt breakdown.     Labs:  CBC RESULTS:   Recent Labs   Lab Test 24  0746   WBC 4.5   RBC 4.62   HGB 15.2   HCT 44.2   MCV 96   MCH 32.9   MCHC 34.4   RDW 11.3        ELECTROLYTES:  Recent Labs   Lab Test 24  0746      POTASSIUM 3.6   CHLORIDE  97*   BRANDON 10.0   CO2 28   BUN 10.0   CR 1.05*   *       Assessment:    Tolerating radiation therapy well.  All questions and concerns addressed.    Toxicities:  Fatigue: Grade 1: Fatigue relieved by rest  Pain: Grade 0: No toxicity  Diarrhea: Grade 0: No toxicity  Urinary frequency: Grade 0: No toxicity  Urinary tract pain: Grade 0: No toxicity  Urinary urgency: Grade 0: No toxicity  Dermatitis: Grade 1: Faint erythema or dry desquamation    Plan:   Continue current therapy.    Continue with Cavilon for skin care.   GI: no diarrhea currently. Imodium as needed.       Mosaiq chart and setup information reviewed  MVCT/IGRT images checked    Medication Review  Med list reviewed with patient?: Yes  Medication changes: No changes per pt    Educational Topic Discussed  Education Instructions: reviewed SE    Daija Strange MD PGY5      I saw and examined the patient with the resident.  I have reviewed and edited the resident's note and agree with the plan of care.         Fior Rai MD/PhD  [phone] clinic  Pager [phone]    Please do not send letter to referring physician.           Again, thank you for allowing me to participate in the care of your patient.        Sincerely,        Fior Rai MD

## 2024-09-23 NOTE — PATIENT INSTRUCTIONS
Contact Numbers  [facility]: [phone] (for symptom and scheduling needs)    Please call the [facility] Triage line if you experience a temperature greater than or equal to 100.4, shaking chills, have uncontrolled nausea, vomiting and/or diarrhea, dizziness, shortness of breath, chest pain, bleeding, unexplained bruising, or if you have any other new/concerning symptoms, questions or concerns.     If you are having any concerning symptoms or wish to speak to a provider before your next infusion visit, please call your care coordinator or triage to notify them so we can adequately serve you.     If you need a refill on a narcotic prescription or other medication, please call triage before your infusion appointment.           September 2024 Sunday Monday Tuesday Wednesday Thursday Friday Saturday   1     2     3    LAB PERIPHERAL   6:15 AM   (15 min.)   [facility] LAB DRAW   [facility]    ONC INFUSION 5 HR (300 MIN)   7:00 AM   (300 min.)   [facility] ONC INFUSION NURSE   [facility]    TREATMENT  10:30 AM   (45 min.)   [facility] ONC [facility] [place] [address] Radiation Oncology    US RENAL COMPLETE NON-VASCULAR   3:45 PM   (30 min.)   [email or web address]   [facility] US [place] 4    TREATMENT  10:30 AM   (45 min.)   [facility] RAD ONC VARIAN2   [facility] Radiation Oncology    OTV  11:00 AM   (30 min.)   Fior Rai MD   [facility] Radiation Oncology 5    TREATMENT  10:30 AM   (45 min.)   [facility] [place] [address] Radiation Oncology 6    TREATMENT  10:30 AM   (45 min.)   [facility] [place] [address] Radiation Oncology 7       8     9    LAB PERIPHERAL   7:15 AM   (15 min.)   [facility] DRAW   [facility]    ONC INFUSION 5 HR (300 MIN)   8:00 AM   (300 min.)   [facility] ONC INFUSION NURSE   [facility]    TREATMENT  10:30 AM   (45  min.)   [facility] RAD ONC VARIAN2   [facility] Radiation Oncology    OTV  11:00 AM   (30 min.)   Fior Rai MD   [facility] Radiation Oncology 10    TREATMENT  10:30 AM   (45 min.)   [facility] ONC VARIAN2   [facility] Radiation Oncology 11    TREATMENT  10:30 AM   (45 min.)   [facility] RAD ONC VARIAN2   [facility] Radiation Oncology 12    TREATMENT  10:30 AM   (45 min.)   [facility] RAD ONC VARIAN2   [facility] Radiation Oncology 13    TREATMENT  10:30 AM   (45 min.)   [facility] ONC VARIAN2   [facility] Radiation Oncology    RETURN CCSL  11:15 AM   (45 min.)   Silvana Marin APRN CNP   [facility] 14       15     16    ONC INFUSION 5 HR (300 MIN)   7:30 AM   (300 min.)   [facility] ONC INFUSION NURSE   [facility]    TREATMENT  10:30 AM   (45 min.)   [facility] RAD ONC VARIAN2   [facility] Radiation Oncology    OTV  11:00 AM   (30 min.)   Fior Rai MD   [facility] Radiation Oncology 17    TREATMENT  10:30 AM   (45 min.)   [facility] ONC VARIAN2   [facility] Radiation Oncology 18    TREATMENT  10:30 AM   (45 min.)   [facility] ONC VARIAN2   [facility] Radiation Oncology 19    TREATMENT  10:30 AM   (45 min.)   [facility] RAD ONC VARIAN2   [facility] Radiation Oncology 20    TREATMENT  10:30 AM   (45 min.)   [facility] ONC VARIAN2   [facility] Radiation Oncology    RETURN CCSL  12:45 PM   (45 min.)   Silvana Marin APRN CNP   [facility] 21       22     23    LAB PERIPHERAL   7:15 AM   (15 min.)   [facility] DRAW   [facility]    ONC INFUSION 5 HR (300 MIN)   8:00 AM   (300 min.)   [facility] ONC INFUSION NURSE   [facility]    TREATMENT   1:15 PM   (45 min.)   [facility] RAD ONC VARIAN2   [facility] Radiation Oncology    OTV   1:30 PM   (30 min.)   Fior Rai MD     [facility] Radiation Oncology 24    TREATMENT  10:30 AM   (45 min.)   [facility] RAD ONC VARIAN2   [facility] Radiation Oncology 25    TREATMENT  10:30 AM   (45 min.)   [facility] RAD ONC VARIAN2   [facility] Radiation Oncology 26    TREATMENT  10:30 AM   (45 min.)   [facility] RAD ONC VARIAN2   [facility] Radiation Oncology 27    TREATMENT  10:30 AM   (45 min.)   [facility] RAD ONC VARIAN2   [facility] Radiation Oncology 28       29     30    TREATMENT  10:30 AM   (45 min.)   [facility] [place] [address] Radiation Oncology    OTV  11:00 AM   (30 min.)   Fior Rai MD   [facility] Radiation Oncology                                      October 2024 Sunday Monday Tuesday Wednesday Thursday Friday Saturday             1     2     3     4     5       6     7     8     9     10     11     12       13     14     15     16     17     18     19       20     21     22     23     24     25     26       27     28     29     30     31                               Lab Results:  Recent Results (from the past 12 hour(s))   Comprehensive metabolic panel    Collection Time: 09/23/24  7:35 AM   Result Value Ref Range    Sodium 139 135 - 145 mmol/L    Potassium 3.7 3.4 - 5.3 mmol/L    Carbon Dioxide (CO2) 28 22 - 29 mmol/L    Anion Gap 10 7 - 15 mmol/L    Urea Nitrogen 8.8 8.0 - 23.0 mg/dL    Creatinine 0.85 0.51 - 0.95 mg/dL    GFR Estimate 76 >60 mL/min/1.73m2    Calcium 9.6 8.8 - 10.4 mg/dL    Chloride 101 98 - 107 mmol/L    Glucose 111 (H) 70 - 99 mg/dL    Alkaline Phosphatase 70 40 - 150 U/L    AST 12 0 - 45 U/L    ALT 8 0 - 50 U/L    Protein Total 6.8 6.4 - 8.3 g/dL    Albumin 4.1 3.5 - 5.2 g/dL    Bilirubin Total 0.4 <=1.2 mg/dL   Magnesium    Collection Time: 09/23/24  7:35 AM   Result Value Ref Range    Magnesium 1.2 (L) 1.7 - 2.3 mg/dL   CBC with platelets and differential    Collection Time: 09/23/24  7:35 AM   Result Value Ref Range    WBC Count 6.4 4.0  - 11.0 10e3/uL    RBC Count 4.13 3.80 - 5.20 10e6/uL    Hemoglobin 13.7 11.7 - 15.7 g/dL    Hematocrit 39.0 35.0 - 47.0 %    MCV 94 78 - 100 fL    MCH 33.2 (H) 26.5 - 33.0 pg    MCHC 35.1 31.5 - 36.5 g/dL    RDW 11.2 10.0 - 15.0 %    Platelet Count 204 150 - 450 10e3/uL    % Neutrophils 76 %    % Lymphocytes 11 %    % Monocytes 8 %    % Eosinophils 4 %    % Basophils 1 %    % Immature Granulocytes 1 %    NRBCs per 100 WBC 0 <1 /100    Absolute Neutrophils 4.9 1.6 - 8.3 10e3/uL    Absolute Lymphocytes 0.7 (L) 0.8 - 5.3 10e3/uL    Absolute Monocytes 0.5 0.0 - 1.3 10e3/uL    Absolute Eosinophils 0.3 0.0 - 0.7 10e3/uL    Absolute Basophils 0.0 0.0 - 0.2 10e3/uL    Absolute Immature Granulocytes 0.0 <=0.4 10e3/uL    Absolute NRBCs 0.0 10e3/uL

## 2024-09-24 ENCOUNTER — APPOINTMENT (OUTPATIENT)
Dept: RADIATION ONCOLOGY | Facility: CLINIC | Age: 64
End: 2024-09-24
Attending: RADIOLOGY
Payer: MEDICARE

## 2024-09-24 PROCEDURE — 77386 HC IMRT TREATMENT DELIVERY, COMPLEX: CPT | Performed by: RADIOLOGY

## 2024-09-24 PROCEDURE — 77014 PR CT GUIDE FOR PLACEMENT RADIATION THERAPY FIELDS: CPT | Mod: 26 | Performed by: RADIOLOGY

## 2024-09-25 ENCOUNTER — APPOINTMENT (OUTPATIENT)
Dept: RADIATION ONCOLOGY | Facility: CLINIC | Age: 64
End: 2024-09-25
Attending: RADIOLOGY
Payer: MEDICARE

## 2024-09-25 PROCEDURE — 77014 PR CT GUIDE FOR PLACEMENT RADIATION THERAPY FIELDS: CPT | Mod: 26 | Performed by: RADIOLOGY

## 2024-09-25 PROCEDURE — 77386 HC IMRT TREATMENT DELIVERY, COMPLEX: CPT | Performed by: RADIOLOGY

## 2024-09-26 ENCOUNTER — APPOINTMENT (OUTPATIENT)
Dept: RADIATION ONCOLOGY | Facility: CLINIC | Age: 64
End: 2024-09-26
Attending: RADIOLOGY
Payer: MEDICARE

## 2024-09-26 PROCEDURE — 77014 PR CT GUIDE FOR PLACEMENT RADIATION THERAPY FIELDS: CPT | Mod: 26 | Performed by: RADIOLOGY

## 2024-09-26 PROCEDURE — 77386 HC IMRT TREATMENT DELIVERY, COMPLEX: CPT | Performed by: RADIOLOGY

## 2024-09-27 ENCOUNTER — APPOINTMENT (OUTPATIENT)
Dept: RADIATION ONCOLOGY | Facility: CLINIC | Age: 64
End: 2024-09-27
Attending: RADIOLOGY
Payer: MEDICARE

## 2024-09-27 PROCEDURE — 77386 HC IMRT TREATMENT DELIVERY, COMPLEX: CPT | Performed by: RADIOLOGY

## 2024-09-27 PROCEDURE — 77336 RADIATION PHYSICS CONSULT: CPT | Performed by: RADIOLOGY

## 2024-09-27 PROCEDURE — 77014 PR CT GUIDE FOR PLACEMENT RADIATION THERAPY FIELDS: CPT | Mod: 26 | Performed by: RADIOLOGY

## 2024-09-30 ENCOUNTER — OFFICE VISIT (OUTPATIENT)
Dept: RADIATION ONCOLOGY | Facility: CLINIC | Age: 64
End: 2024-09-30
Attending: RADIOLOGY
Payer: MEDICARE

## 2024-09-30 VITALS
OXYGEN SATURATION: 96 % | BODY MASS INDEX: 21.9 KG/M2 | SYSTOLIC BLOOD PRESSURE: 120 MMHG | WEIGHT: 144 LBS | HEART RATE: 110 BPM | DIASTOLIC BLOOD PRESSURE: 87 MMHG

## 2024-09-30 DIAGNOSIS — C51.9 VULVAR CANCER (H): Primary | ICD-10-CM

## 2024-09-30 PROCEDURE — 77386 HC IMRT TREATMENT DELIVERY, COMPLEX: CPT | Performed by: RADIOLOGY

## 2024-09-30 NOTE — PATIENT INSTRUCTIONS
Discharge Information - Gynecological Cancer  Many side effects of treatment are temporary.  They will slowly improve after your treatments have ended.  Skin problems often improve within two to four weeks.  It can take longer for your energy to return.  Go to all of your follow-up visits with your doctor.  Call if you have new symptoms or problems.  Keep up focusing on good nutrition for at least four more weeks.  If you maintain your weight and nutrition, you will recover faster.  If you have given up some foods, go back to them very slowly and in small amounts.  If they cause cramps or diarrhea, wait another week or two before trying them again.  Drink plenty of fluids.  Keep caring for skin in the treated area  Continue with sitz baths if needed and applying skin ointment or cream as needed or until your first follow-up visit.  Avoid very hot water when bathing  Avoid deodorant, perfumed soap, ointments, salves and tight underwear for six weeks.  Do not expose treated skin to direct sunlight, heat lamps, sun lamps or other extreme hot or cold.  You will need to do this for the rest of your life.  You may need to use water soluble gel during sex to avoid discomfort.  You may need to do this for the rest of your life.  When should I call my doctor? [phone]  Call your doctor if you have any of these new symptoms:  Nausea, vomiting or diarrhea.  Feeling dizzy  Weight loss  Pain  Skin problems  Vaginal bleeding or fluid that soaks more than three pads a day or any bleeding with sex  A fever over 100.4 (38 C) (taken under the tongue)  Any sudden change in your condition

## 2024-09-30 NOTE — PROGRESS NOTES
[facility] PHYSICIANS  SPECIALIZING IN BREAKTHROUGHS  Radiation Oncology    On Treatment Visit Note      Regina Gaspar      Date: 2024   MRN: [id number]   : 1960  Diagnosis: Vulvar cancer      Reason for Visit:  On Radiation Treatment Visit     Treatment Summary to Date  Treatment Site: Pelvis Current Dose: 6000/6000 cGy Fractions:       Chemotherapy  Chemo concurrent with radx?: Yes  Oncologist: Kaley  Drug Name/Frequency 1: Ciplatin/weekly (Last chemo 24)    ED Visit/Hospital Admission: None    Treatment Breaks: None (other than planned break on Labor Day)       Subjective:   Regina is all smile today. She denies pain or irritation of the vulva/perineum. She had a couple of episodes of loose stools over the weekend. She has no complaints whatsoever.     Nursing ROS:   Nutrition Alteration  Diet Type: Patient's Preference  Nutrition Note: appetite good  Skin  Skin Note: MD will check skin on table        Cardiovascular  Respiratory effort: 1 - Normal - without distress  Gastrointestinal  GI Note: WNL  Genitourinary   Note: WNL  Psychosocial  [facility]osocial Note[facility]  Pain Assessment  0-10 Pain Scale: 0      Objective:   /87   Pulse 110   Wt 65.3 kg (144 lb)   LMP  (LMP Unknown)   SpO2 96%   BMI 21.90 kg/m    Gen: Appears well, in no acute distress  Skin: Brisk erythema of the vulva/perineum, bilateral groin. Patchy moist desquamation in the right groin crease.     Labs:  CBC RESULTS:   Recent Labs   Lab Test 24  0735   WBC 6.4   RBC 4.13   HGB 13.7   HCT 39.0   MCV 94   MCH 33.2*   MCHC 35.1   RDW 11.2        ELECTROLYTES:  Recent Labs   Lab Test 24  0735      POTASSIUM 3.7   CHLORIDE 101   BRANDON 9.6   CO2 28   BUN 8.8   CR 0.85   *       Assessment:    Tolerating radiation therapy well.  All questions and concerns addressed.    Toxicities:  Fatigue: Grade 1: Fatigue relieved by rest  Diarrhea: Grade 1:  Increase of <4 stools per day over baseline; mild increase in ostomy output compared to baseline  Urinary frequency: Grade 0: No toxicity  Urinary tract pain: Grade 0: No toxicity  Urinary urgency: Grade 0: No toxicity  Dermatitis: Grade 2: Moderate to brisk erythema; patchy moist desquamation, mostly confined to skin folds and creases; moderate erythema    Plan:   She completed the treatment today. Discussed skin care -- she is to continue with Cavilon for the next 2-3 weeks until dermatitis substantially improves. If she develops more diffuse moist desquamation in the groin, she will contact us for Silvadene cream.   Mild diarrhea -- continue with Imodium as needed.   Follow up in 1 month.       Mosaiq chart and setup information reviewed  MVCT/IGRT images checked    Medication Review  Medication changes: No changes per pt    Educational Topic Discussed  Education Instructions: reviewed SE        Fior Rai MD/PhD  [phone] clinic  Pager [phone]    Please do not send letter to referring physician.

## 2024-09-30 NOTE — LETTER
2024      Regina Gaspar  [address]  [facility] [address]      Dear Colleague,    Thank you for referring your patient, Regina Gaspar, to the [facility] RADIATION ONCOLOGY. Please see a copy of my visit note below.    [facility] PHYSICIANS  SPECIALIZING IN BREAKTHROUGHS  Radiation Oncology    On Treatment Visit Note      Regina Gaspar      Date: 2024   MRN: [id number]   : 1960  Diagnosis: Vulvar cancer      Reason for Visit:  On Radiation Treatment Visit     Treatment Summary to Date  Treatment Site: Pelvis Current Dose: 6000/6000 cGy Fractions:       Chemotherapy  Chemo concurrent with radx?: Yes  Oncologist: Kaley  Drug Name/Frequency 1: Ciplatin/weekly (Last chemo 24)    ED Visit/Hospital Admission: None    Treatment Breaks: None (other than planned break on Labor Day)       Subjective:   Regina is all smile today. She denies pain or irritation of the vulva/perineum. She had a couple of episodes of loose stools over the weekend. She has no complaints whatsoever.     Nursing ROS:   Nutrition Alteration  Diet Type: Patient's Preference  Nutrition Note: appetite good  Skin  Skin Note: MD will check skin on table        Cardiovascular  Respiratory effort: 1 - Normal - without distress  Gastrointestinal  GI Note: WNL  Genitourinary   Note: WNL  Psychosocial  Psychosocial Note: staying at [facility], going well  Pain Assessment  0-10 Pain Scale: 0      Objective:   /87   Pulse 110   Wt 65.3 kg (144 lb)   LMP  (LMP Unknown)   SpO2 96%   BMI 21.90 kg/m    Gen: Appears well, in no acute distress  Skin: Brisk erythema of the vulva/perineum, bilateral groin. Patchy moist desquamation in the right groin crease.     Labs:  CBC RESULTS:   Recent Labs   Lab Test 24  0735   WBC 6.4   RBC 4.13   HGB 13.7   HCT 39.0   MCV 94   MCH 33.2*   MCHC 35.1   RDW 11.2        ELECTROLYTES:  Recent Labs   Lab Test 24  0735       POTASSIUM 3.7   CHLORIDE 101   BRANDON 9.6   CO2 28   BUN 8.8   CR 0.85   *       Assessment:    Tolerating radiation therapy well.  All questions and concerns addressed.    Toxicities:  Fatigue: Grade 1: Fatigue relieved by rest  Diarrhea: Grade 1: Increase of <4 stools per day over baseline; mild increase in ostomy output compared to baseline  Urinary frequency: Grade 0: No toxicity  Urinary tract pain: Grade 0: No toxicity  Urinary urgency: Grade 0: No toxicity  Dermatitis: Grade 2: Moderate to brisk erythema; patchy moist desquamation, mostly confined to skin folds and creases; moderate erythema    Plan:   She completed the treatment today. Discussed skin care -- she is to continue with Cavilon for the next 2-3 weeks until dermatitis substantially improves. If she develops more diffuse moist desquamation in the groin, she will contact us for Silvadene cream.   Mild diarrhea -- continue with Imodium as needed.   Follow up in 1 month.       Mosaiq chart and setup information reviewed  MVCT/IGRT images checked    Medication Review  Medication changes: No changes per pt    Educational Topic Discussed  Education Instructions: reviewed SE        Fior Rai MD/PhD  [phone] clinic  Pager [phone]    Please do not send letter to referring physician.         Again, thank you for allowing me to participate in the care of your patient.        Sincerely,        Fior Rai MD

## 2024-10-04 ENCOUNTER — ONCOLOGY VISIT (OUTPATIENT)
Dept: RADIATION ONCOLOGY | Facility: CLINIC | Age: 64
End: 2024-10-04
Payer: MEDICARE

## 2024-10-04 NOTE — LETTER
10/4/2024      Regina Gaspar  [address]  [facility] [address]      Dear Colleague,    Thank you for referring your patient, Regina Gaspar, to the [facility] RADIATION ONCOLOGY. Please see a copy of my visit note below.    No notes on file    Again, thank you for allowing me to participate in the care of your patient.        Sincerely,        Fior Rai MD

## 2024-10-09 NOTE — PROCEDURES
Radiotherapy Treatment Summary          Date of Report: 2024     PATIENT: LOIS FINN  MEDICAL RECORD NO: [id number]  : 1960     DIAGNOSIS: C51.9 Malignant neoplasm of vulva, unspecified  INTENT OF RADIOTHERAPY: Cure  PATHOLOGY: squamous cell carcinoma of the vulva      STAGE: pT1b N2a (FIGO IIIB)  CONCURRENT SYSTEMIC THERAPY: weekly Cisplatin 40mg/m2   Details of the treatments summarized below are found in records kept in the Department of Radiation Oncology at [facility].     Treatment Summary:  Treatment Site             Current Dose         Modality      From        To Elapsed Days Fx.  Pelvis Groin Vulva         4,500 cGy          06 X  8/19/2024  2024        35 25  R Groin Op Bed Bst       1,000 cGy          06 X  9/24/2024  2024         6  5     Dose per Fraction:  Pelvis, Groin, Vulva   180cGy  Simultaneous boost vulva/R groin 200cGy  Sequential boost to right groin 200cGy     Total Dose:  Pelvis, Groin, Vulva  45Gy  R vulva, R groin  50Gy  Right groin tumor bed  60Gy        COMMENTS:                      Ms. Finn is a 63yo female referred for adjuvant radiation for squamous cell carcinoma of the vulva. She   initially presented with ED on 3/31/2024 with 1 month h/o foul-smelling dark yellow vaginal discharge and a   lump on her right labia. Exam showed a 3 cm friable lesion. CT of the chest/abdomen/pelvis showed a 2.3 x 2.3   cm low density lymph node I the right groin, but otherwise unremarkable. She then saw Dr. Hernandez in Ob/Gyn on   2024. Bx of the vulva mass was consistent with well differentiated squamous cell carcinoma, HPV 16   positive. Cervical pap was negative for intraepithelial lesion.      Lois established care with Dr. Roche. PET/CT on 2024 revealed the vulva lesion to measure 3.2 x   3.6 x 0.8 cm, with a max SUV of 10.37. Additionally, there was a 2.8 x 2.5 cm necrotic right inguinal node with   max SUV of 3.58 and a 6 x 12 mm right pelvic side  wall node with max SUV of 3.5. A prominent portacaval   node measuring 7 mm x 19 mm with max SUV of 2.93 was felt to be reactive.      She proceeded with radical right vulvectomy and lymph node sampling on 5/28/2024. Intra-op findings   included grossly abnormal right vulva with a 4 x 4 cm mass involving the mid portion of the labia majora and   minora. Enlarged right groin lymph node, enlarged obturator space node. The right obturator node was removed   via laparoscopic procedure. 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 LVSI   was not identified. All margins were greater than 10 mm for invasive, H/CHERYLE or dVIN. R inguinal dissection had   3 of 6 positive nodes, with the largest 3.6 cm without DRE. L inguinal dissection 0/3. Right pelvic LND 0/4.   Final stage [id number], FIGO IIIB.      Adjuvant chemoradiation was recommended for improved local control. She received 45Gy in 25 fractions to   the pelvis via IMRT. The vulva and right groin received a simultaneous boost to 50Gy in 25 fractions. The right   groin surgical bed then received an additional sequential boost 10Gy in 5 fractions for a total dose of 60Gy. She   tolerated EBRT well. Her EBRT was complicated by diarrhea for which she used Imodium and dermatitis for   which she applied skin barrier cream Cavilon. She never developed much pain or significant dermatitis. She had   mild fatigue which required more rest.     ED visits/hospitalizations:  none  Missed treatments:  Labor Day for planned holiday  Acute Toxicity Profile by CTC v5.0: Fatigue, grade 1; Diarrhea, grade 1; Dermatitis, grade 2     PAIN MANAGEMENT:    Not required.       FOLLOW UP PLAN:     1. Follow up in our clinic 1 month  2. Follow up with Dr. Roche as scheduled.                    Resident Physician:   Daija Strange M.D.   Staff Physician:  Fior Rai M.D.  CC: Javier Roche MD           Radiation Oncology:  [facility] [address] [place] [address]

## 2024-10-14 ENCOUNTER — TELEPHONE (OUTPATIENT)
Dept: RADIATION ONCOLOGY | Facility: CLINIC | Age: 64
End: 2024-10-14
Payer: MEDICARE

## 2024-10-14 NOTE — TELEPHONE ENCOUNTER
scheduling staff as well as RN have tried multiple times, over a couple weeks to reach pt. She has never picked up the phone so messages have been left to please call the clinic back and phone number given [phone].    No return phone call to schedule her 1 month follow up appointment.      MD notified

## 2024-10-15 ENCOUNTER — DOCUMENTATION ONLY (OUTPATIENT)
Dept: RADIATION ONCOLOGY | Facility: CLINIC | Age: 64
End: 2024-10-15
Payer: MEDICARE

## 2024-10-15 ENCOUNTER — PATIENT OUTREACH (OUTPATIENT)
Dept: ONCOLOGY | Facility: CLINIC | Age: 64
End: 2024-10-15
Payer: MEDICARE

## 2024-10-15 DIAGNOSIS — C57.7 MALIGNANT NEOPLASM OF OTHER SPECIFIED FEMALE GENITAL ORGANS (H): ICD-10-CM

## 2024-10-15 DIAGNOSIS — C51.9 VULVAR CANCER (H): Primary | ICD-10-CM

## 2024-10-15 DIAGNOSIS — C51.9 MALIGNANT NEOPLASM OF VULVA (H): ICD-10-CM

## 2024-10-15 NOTE — PROGRESS NOTES
[facility]: Cancer Care Note                                                          Received update from Dr. Roche that he would like this patient to have a PET scan and follow-up visit with him approximately 8 weeks after her completion of radiation therapy treatment (her last treatment was on 9/30/24).  Order for PET scan placed, and request was sent to our scheduling team this afternoon.      Writer placed telephone call to patient this afternoon, to communicate above recommendations from Dr. Roche.  Patient did not answer call at this time.  Detailed voicemail message was left, providing this information.  Advised patient that she will be hearing from our scheduling team soon, and encouraged her to reach out with any questions or concerns.      Daquan Cramer, RN, BSN, OCN  RN Care Coordinator - Oncology  [facility]

## 2024-10-25 NOTE — PROGRESS NOTES
Virtual Visit Details    Type of service:  Video Visit   Video Start Time: 8:06 AM  Video End Time: 8:17 AM    Originating Location (pt. Location): Home    Distant Location (provider location):  On-site  Platform used for Video Visit: 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  She is here today for 1 month follow up post radiation by video.     Oncology History:  3/2024: Initially, she was seen in the ED visit 3/2024 with a vulvar lesion and some early satiety.      3/31/24 CT:  IMPRESSION:  1.  2.3 x 2.3 cm low-density lymph node or possible small fluid collection noted within the right groin. Could consider ultrasound and/or biopsy as clinically indicated.   2.  No other evidence of malignancy within the chest, abdomen, and pelvis.     24: PAP NILM, HPV 16+. Vulva, right, biopsy-  Well differentiated squamous cell carcinoma with keratinization, superficially invasive, incompletely excised     24: PET CT  IMPRESSION:   In this patient with history of vulvar squamous cell carcinoma:  1. FDG avid lesion within the vulva consistent with biopsy-proven  squamous cell carcinoma.  2. 2.8 x 2.5 cm necrotic right inguinal lymph node representing a  metastatic node.  3. 1.2 x 0.6 cm mildly avid right obturator internus/pelvic sidewall  node is indeterminate.  4. No evidence of distant metastasis.     24 Radical local excision, bilateral inguinal and right pelvic LND  A. Vulva. 12 o'clock margin, biopsy:  - Squamous epithelium with reactive changes  - Negative for dysplasia or malignancy  B. Vulva, 3 o'clock margin, biopsy:   - Squamous epithelium with reactive changes  - Negative for dysplasia or malignancy  C. Vulva, 6  o'clock margin, biopsy:  - Squamous epithelium with reactive changes  - Negative for dysplasia or malignancy  D. Lymph node, right inguinal femoral, lymphadenectomy:  - METASTATIC SQUAMOUS CELL CARCINOMA in three of six lymph nodes (3/6)  - Largest metastatic deposit measures 3.6 cm  - No extra capsular extension identified  E. Lymph node, left inguinal femoral, lymphadenectomy:  - Two reactive lymph nodes examined, negative for malignancy (0/2)  F. Vulva, right, radical excision:  - INVASIVE KERATINIZING SQUAMOUS CELL CARCINOMA, moderately differentiated, HPV associated  - Size: 4.2 cm  - Depth of invasion 5 mm  - Surgical margins are negative for high grade dysplasia or carcinoma   G. Endocervix, curetting:  - Predominantly mucus with rare fragments of ectocervical and endocervical epithelium with reactive changes  - Negative for dysplasia or malignancy  H. Cervix, random, biopsies:  - Ectocervical and endocervical junctional tissue with reactive epithelial changes  - Negative for dysplasia or malignancy   I. Lymph node, right pelvic, lymphadenectomy:  - Four reactive lymph nodes examined, negative for malignancy (0/4)     Plan: Chemoradiation with weekly cisplatin     8/19/24: Cycle 1 day 1 cisplatin 40 mg/m2.    8/26/24: Cycle 1 day 8 cisplatin Creatinine 2.28.  Cisplatin held and given 1L D5 1/2 NS.  9/3/24: Cycle 1 day 15 cisplatin. Creatinine 2.39.  Cisplatin held and given 2L D5 1/2 NS. Creatinine down to 2.09 after fluids.  Renal US  IMPRESSION:  1.  Normal renal ultrasound without hydronephrosis or suspicious mass.  9/9/24: Cycle 1 day 22 cisplatin 20 mg/m2 due to kidney function. Creatinine 1.26.    9/16/24: Cycle 1 day 29 cisplatin 20 mg/m2  9/23/24: Cycle 1 day 36 cisplatin 30 mg/m2    Radiation Treatment Summary:  Treatment Site             Current Dose         Modality      From        To Elapsed Days Fx.  Pelvis Groin Vulva         4,500 cGy          06 X  8/19/2024  9/23/2024        35 25  R Groin  Op Bed Bst       1,000 cGy          06 X  9/24/2024  2024         6  5     Dose per Fraction:  Pelvis, Groin, Vulva   180cGy  Simultaneous boost vulva/R groin 200cGy  Sequential boost to right groin 200cGy     Total Dose:  Pelvis, Groin, Vulva  45Gy  R vulva, R groin  50Gy  Right groin tumor bed  60Gy         Today patient reports she is doing very well. Her main symptom is increased fatigue but is manageable. Continues to take mag, calcium, zinc supplement.    -Tinnitus: No  -Fevers: No  -Chest pain: No  -SOB: No  -Weight loss: Unable to weigh self but feels like clothes fit the same   -Appetite: Decreased appetite x few months, eats 6-8 small meals per day, mindful of getting good nutrition   -Nausea or vomiting: No  -Abdominal pain: Reports occasional bloating and increased gas  -Diarrhea/Constipation: No, reports having regular stools  -Vaginal bleeding/discharge: No. Vulvar pain has also improved. Notes that vulvar skin feels dry and flaky. Intermittent use of cavilon skin prep to protect the skin. Wondering about whether she should moisturize this tissue.  -Neuropathy symptoms: No  -Leg swelling: No            Past Medical History:    Past Medical History:   Diagnosis Date    Vulvar cancer (H) 2024         Past Surgical History:    Past Surgical History:   Procedure Laterality Date     SECTION      LAPAROSCOPIC LYMPHADENECTOMY N/A 2024    Procedure: Laparoscopic lymphadenectomy;  Surgeon: Javier Roche MD;  Location: [facility] OR    VULVECTOMY RADICAL DISSECT GROIN(S) N/A 2024    Procedure: Radical excision of right vulvar lesion. Removal of lymph nodes from left and right groin. Biopsies of cervix.;  Surgeon: Javier Roche MD;  Location:  OR         Health Maintenance Due   Topic Date Due    DEXA  Never done    ADVANCE CARE PLANNING  Never done    DEPRESSION ACTION PLAN  Never done    MAMMO SCREENING  Never done    Pneumococcal Vaccine: 65+ Years (1 of 2 - PCV)  Never done    COLORECTAL CANCER SCREENING  Never done    HIV SCREENING  Never done    HEPATITIS C SCREENING  Never done    ZOSTER IMMUNIZATION (1 of 2) Never done    LIPID  Never done    RSV VACCINE (1 - Risk 60-74 years 1-dose series) Never done    COVID-19 Vaccine (3 - Pfizer risk series) 11/22/2021    MEDICARE ANNUAL WELLNESS VISIT  Never done    COLPOSCOPY  Never done    INFLUENZA VACCINE (1) 09/01/2024       Current Medications:     Current Outpatient Medications   Medication Sig Dispense Refill    dexAMETHasone (DECADRON) 4 MG tablet Take 2 tablets (8 mg) by mouth daily Take for 3 days, starting the day after chemo on day 2 and 9. Take with food. If no nausea and vomiting with first cisplatin doses, may stop dexamethasone. (Patient not taking: Reported on 9/16/2024) 12 tablet 2    magnesium oxide (MAG-OX) 400 MG tablet Take 1 tablet (400 mg) by mouth daily. 30 tablet 0    ondansetron (ZOFRAN) 8 MG tablet Take 1 tablet (8 mg) by mouth every 8 hours as needed for nausea (vomiting) Do not take for 3 days after chemo (Patient not taking: Reported on 9/16/2024) 30 tablet 2    prochlorperazine (COMPAZINE) 5 MG tablet Take 1 tablet (5 mg) by mouth every 6 hours as needed for nausea or vomiting (Patient not taking: Reported on 9/16/2024) 30 tablet 2    traZODone (DESYREL) 100 MG tablet Take 1-2 tablets (100-200 mg) by mouth at bedtime 180 tablet 1         Allergies:      No Known Allergies     Social History:     Social History     Tobacco Use    Smoking status: Former     Types: Cigarettes     Passive exposure: Past    Smokeless tobacco: Never    Tobacco comments:     50 pack years. 8/8/24, has not had a cigarette in a couple weeks   Substance Use Topics    Alcohol use: Yes     Comment: quart vodka/ week       History   Drug Use Unknown         Family History:     The patient's family history is notable for:    Family History   Problem Relation Age of Onset    Breast Cancer Mother         has had a couple times     Colon Cancer Father 80    Uterine Cancer No family hx of          Physical Exam:     LMP  (LMP Unknown)   There is no height or weight on file to calculate BMI.    General Appearance: alert, no distress    Eyes:  Eyes grossly normal to inspection.  No discharge or erythema, or obvious scleral/conjunctival abnormalities.    Respiratory: No audible wheeze, cough, or visible cyanosis.  No visible retractions or increased work of breathing.     Musculoskeletal: extremities non tender and without edema    Skin: no lesions or rashes on visible skin    Neurological: normal gait, no gross defects     Psychiatric: appropriate mood and affect. Mentation appears normal, affect normal/bright, judgement and insight intact, normal speech and appearance well-groomed                            The rest of a comprehensive physical examination is deferred due to video visit restrictions.        No results found for this or any previous visit (from the past 24 hours).       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  She is here today for 1 month follow up post radiation by video.    20 minutes spent on the date of the encounter doing chart review, history and exam, documentation, and further activities as noted above.      Plan:     1.)        Vulvar cancer:  Patient doing well at 1 month post-radiation follow up. Plan to follow up with radiation oncology provider next week. Dr. Roche would like this patient to receive imaging 8 weeks after radiation instead of 3 months. 8-week post-radiation PET scan is scheduled on 11/26 followed by an appointment with Dr. Roche on 12/12. Discussed this with patient, and she is in agreement with plan. In the meantime, educated patient that she may continue to use cavilon to protect irradiated vulvar tissue but not to add a moisturizer to minimize risk of further skin breakdown and infection. Discussed  that radiation oncology will likely discuss use of vaginal dilator at upcoming appointment. Reviewed signs and symptoms for when she should contact the clinic or seek additional care. Patient to contact the clinic with any questions or concerns in the interim.      Genetic risk factors were assessed and she does not meet qualification for referral.     Labs and/or tests reviewed include:  none.                2.)        Health maintenance:  Issues addressed today include following up with PCP for annual health maintenance and non-gynecologic issues.        RAQUEL Ferrara, RN, OCN  Student Nurse Practitioner    I was with student Dinorah Flanagan throughout visit and agree with HPI, exam, and plan as written.    Silvana Marin, DNP, APRN, FNP-C, AOCNP  Oncology Nurse Practitioner   Division of Gynecologic Oncology  Pager: [phone]        CC  Patient Care Team:  Brooke Li PA-C as PCP - General (Family Medicine)  Carolina Hernandez DO as Physician (OB/Gyn)  Yomaira Roche MD as MD (Gynecologic Oncology)  Carolina Hernandez DO as Assigned OBGYN Provider  Daquan Cramer, RN as Specialty Care Coordinator (Hematology & Oncology)  Caio Jimenez MD as Physician (Radiation Oncology)  Everton Jimenez MD as MD (Radiation Oncology)  Brooke Li PA-C as Assigned PCP  Fior Rai MD as Assigned Cancer Care Provider  YOMAIRA ROCHE

## 2024-10-30 ENCOUNTER — VIRTUAL VISIT (OUTPATIENT)
Dept: ONCOLOGY | Facility: CLINIC | Age: 64
End: 2024-10-30
Attending: OBSTETRICS & GYNECOLOGY
Payer: MEDICARE

## 2024-10-30 VITALS — HEIGHT: 67 IN | BODY MASS INDEX: 22.76 KG/M2 | WEIGHT: 145 LBS

## 2024-10-30 DIAGNOSIS — C51.9 VULVAR CANCER (H): Primary | ICD-10-CM

## 2024-10-30 PROCEDURE — 99213 OFFICE O/P EST LOW 20 MIN: CPT | Mod: 95 | Performed by: NURSE PRACTITIONER

## 2024-10-30 ASSESSMENT — PAIN SCALES - GENERAL: PAINLEVEL_OUTOF10: NO PAIN (0)

## 2024-10-30 NOTE — PROGRESS NOTES
Department of Therapeutic Radiology--Radiation Oncology                   [place] [address] [place]  [address]  Office:  [phone]  Fax:  [phone]   Radiation Oncology Clinic  [address] [place] [address]  Phone:  [phone]  Fax:  [phone]     RE: Regina Gaspar : 1960   MRN: [id number] WILFREDO: 2024     OUTPATIENT VISIT NOTE       DIAGNOSIS: Squamous cell carcinoma of the vulva, [id number], FIGO IIIB    AREAS TREATED: She received 45Gy in 25 fractions to the pelvis via IMRT. The vulva and right groin received a simultaneous boost to 50Gy in 25 fractions. The right groin surgical bed then received an additional sequential boost 10Gy in 5 fractions for a total dose of 60Gy               INTERVAL SINCE COMPLETION OF RADIATION THERAPY: 1 month (she completed the treatment on 2024)    SUBJECTIVE: Ms. Gaspar is a 63yo female referred for adjuvant radiation for squamous cell carcinoma of the vulva. She initially presented with ED on 3/31/2024 with 1 month h/o foul-smelling dark yellow vaginal discharge and a lump on her right labia. Exam showed a 3 cm friable lesion. CT of the chest/abdomen/pelvis showed a 2.3 x 2.3 cm low density lymph node I the right groin, but otherwise unremarkable. She then saw Dr. Hernandez in Ob/Gyn on 2024. Bx of the vulva mass was consistent with well differentiated squamous cell carcinoma, HPV 16 positive. Cervical pap was negative for intraepithelial lesion.      Regina established care with Dr. Roche. PET/CT on 2024 revealed the vulva lesion to measure 3.2 x 3.6 x 0.8 cm, with a max SUV of 10.37. Additionally, there was a 2.8 x 2.5 cm necrotic right inguinal node with max SUV of 3.58 and a 6 x 12 mm right pelvic side wall node with max SUV of 3.5. A prominent portacaval node measuring 7 mm x 19 mm with max SUV of 2.93 was felt to be reactive.      She proceeded with radical right vulvectomy and lymph node sampling  on 5/28/2024. Intra-op findings   included grossly abnormal right vulva with a 4 x 4 cm mass involving the mid portion of the labia majora and minora. Enlarged right groin lymph node, enlarged obturator space node. The right obturator node was removed via laparoscopic procedure. 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 LVSI was not identified. All margins were greater than 10 mm for invasive, H/CHERYLE or dVIN. R inguinal dissection had 3 of 6 positive nodes, with the largest 3.6 cm without DRE. L inguinal dissection 0/3. Right pelvic LND 0/4. Final stage [id number], FIGO IIIB.      Adjuvant chemoradiation was recommended for improved local control. She received 45Gy in 25 fractions to the pelvis via IMRT. The vulva and right groin received a simultaneous boost to 50Gy in 25 fractions. The right groin surgical bed then received an additional sequential boost 10Gy in 5 fractions for a total dose of 60Gy. She tolerated EBRT well. Her EBRT was complicated by diarrhea for which she used Imodium and dermatitis for which she applied skin barrier cream Cavilon. She never developed much pain or significant dermatitis. She had mild fatigue which required more rest.    Since completing the radiation therapy, Regina had a virtual follow up visit with Gyn Onc, Ms. Silvana Marin. She is scheduled for a PET/CT on 11/26 and will see Dr. Roche for follow up on 12/12/2024.     Regina is here for a routine 1 month follow up visit. On interview, Regina states that she is doing very well. She didn't have much pain during the radiation therapy and continues to deny pain in the vulva. She has no bowel or urinary issues.       OBJECTIVE:   BP (!) 150/80   Pulse 120   Wt 66.2 kg (146 lb)   LMP  (LMP Unknown)   BMI 22.87 kg/m     Gen: Appears well, NAD  CV: well perfused  Resp: breathing  comfortably on room air  Pelvis: Dry desquamation in bilateral groin, healing, no weeping. The vulva is mildly erythematous. The introitus is difficult to visualize due to the anatomy change from vulvectomy. On digital exam, there is some tissue thickening at the right aspect of the introitus. The vagina can accommodate a 2 cm diameter dilator.   Groin: no palpable groin adenopathy    ASSESSMENT AND PLAN: In summary, Regina is a 63 yo female with a FIGO IIIB, [id number] SCC of the vulva, s/p radical vulvectomy and LN sampling, 1 month s/p adjuvant radiation therapy with concurrent Cisplatin.     She tolerated the adjuvant therapy exceedingly well. Her dermatitis in the groin is healing nicely.     We discussed the importance of using a vaginal dilator to prevent vaginal stenosis. A small and extra small plus dilator were provided today with instruction.     Regina has a PET/CT scheduled on 11/26 and will see Dr. Roche on 12/12/2024. She can be seen in our clinic as needed.          Fior Rai M.D./Ph.D.  Radiation Oncologist   Department of Therapeutic Radiology   [facility]  Phone: [phone]            20 minutes were spent on the date of the encounter doing chart review, history and exam, documentation and further activities as noted above.           Fior Rai MD

## 2024-10-30 NOTE — LETTER
10/30/2024      Regina Gaspar  [address]  [place] [address]      Dear Colleague,    Thank you for referring your patient, Regina Gaspar, to the [facility]. Please see a copy of my visit note below.    Virtual Visit Details    Type of service:  Video Visit   Video Start Time: 8:06 AM  Video End Time: 8:17 AM    Originating Location (pt. 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  She is here today for 1 month follow up post radiation by video.     Oncology History:  3/2024: Initially, she was seen in the ED visit 3/2024 with a vulvar lesion and some early satiety.      3/31/24 CT:  IMPRESSION:  1.  2.3 x 2.3 cm low-density lymph node or possible small fluid collection noted within the right groin. Could consider ultrasound and/or biopsy as clinically indicated.   2.  No other evidence of malignancy within the chest, abdomen, and pelvis.     24: PAP NILM, HPV 16+. Vulva, right, biopsy-  Well differentiated squamous cell carcinoma with keratinization, superficially invasive, incompletely excised     24: PET CT  IMPRESSION:   In this patient with history of vulvar squamous cell carcinoma:  1. FDG avid lesion within the vulva consistent with biopsy-proven  squamous cell carcinoma.  2. 2.8 x 2.5 cm necrotic right inguinal lymph node representing a  metastatic node.  3. 1.2 x 0.6 cm mildly avid right obturator internus/pelvic sidewall  node is indeterminate.  4. No evidence of distant metastasis.     24 Radical local excision, bilateral inguinal and right pelvic LND  A. Vulva.  12 o'clock margin, biopsy:  - Squamous epithelium with reactive changes  - Negative for dysplasia or malignancy  B. Vulva, 3 o'clock margin, biopsy:   - Squamous epithelium with reactive changes  - Negative for dysplasia or malignancy  C. Vulva, 6 o'clock margin, biopsy:  - Squamous epithelium with reactive changes  - Negative for dysplasia or malignancy  D. Lymph node, right inguinal femoral, lymphadenectomy:  - METASTATIC SQUAMOUS CELL CARCINOMA in three of six lymph nodes (3/6)  - Largest metastatic deposit measures 3.6 cm  - No extra capsular extension identified  E. Lymph node, left inguinal femoral, lymphadenectomy:  - Two reactive lymph nodes examined, negative for malignancy (0/2)  F. Vulva, right, radical excision:  - INVASIVE KERATINIZING SQUAMOUS CELL CARCINOMA, moderately differentiated, HPV associated  - Size: 4.2 cm  - Depth of invasion 5 mm  - Surgical margins are negative for high grade dysplasia or carcinoma   G. Endocervix, curetting:  - Predominantly mucus with rare fragments of ectocervical and endocervical epithelium with reactive changes  - Negative for dysplasia or malignancy  H. Cervix, random, biopsies:  - Ectocervical and endocervical junctional tissue with reactive epithelial changes  - Negative for dysplasia or malignancy   I. Lymph node, right pelvic, lymphadenectomy:  - Four reactive lymph nodes examined, negative for malignancy (0/4)     Plan: Chemoradiation with weekly cisplatin     8/19/24: Cycle 1 day 1 cisplatin 40 mg/m2.    8/26/24: Cycle 1 day 8 cisplatin Creatinine 2.28.  Cisplatin held and given 1L D5 1/2 NS.  9/3/24: Cycle 1 day 15 cisplatin. Creatinine 2.39.  Cisplatin held and given 2L D5 1/2 NS. Creatinine down to 2.09 after fluids.  Renal US  IMPRESSION:  1.  Normal renal ultrasound without hydronephrosis or suspicious mass.  9/9/24: Cycle 1 day 22 cisplatin 20 mg/m2 due to kidney function. Creatinine 1.26.    9/16/24: Cycle 1 day 29 cisplatin 20 mg/m2  9/23/24: Cycle 1  day 36 cisplatin 30 mg/m2    Radiation Treatment Summary:  Treatment Site             Current Dose         Modality      From        To Elapsed Days Fx.  Pelvis Groin Vulva         4,500 cGy          06 X  8/19/2024  2024        35 25  R Groin Op Bed Bst       1,000 cGy          06 X  9/24/2024  2024         6  5     Dose per Fraction:  Pelvis, Groin, Vulva   180cGy  Simultaneous boost vulva/R groin 200cGy  Sequential boost to right groin 200cGy     Total Dose:  Pelvis, Groin, Vulva  45Gy  R vulva, R groin  50Gy  Right groin tumor bed  60Gy         Today patient reports she is doing very well. Her main symptom is increased fatigue but is manageable. Continues to take mag, calcium, zinc supplement.    -Tinnitus: No  -Fevers: No  -Chest pain: No  -SOB: No  -Weight loss: Unable to weigh self but feels like clothes fit the same   -Appetite: Decreased appetite x few months, eats 6-8 small meals per day, mindful of getting good nutrition   -Nausea or vomiting: No  -Abdominal pain: Reports occasional bloating and increased gas  -Diarrhea/Constipation: No, reports having regular stools  -Vaginal bleeding/discharge: No. Vulvar pain has also improved. Notes that vulvar skin feels dry and flaky. Intermittent use of cavilon skin prep to protect the skin. Wondering about whether she should moisturize this tissue.  -Neuropathy symptoms: No  -Leg swelling: No            Past Medical History:    Past Medical History:   Diagnosis Date     Vulvar cancer (H) 2024         Past Surgical History:    Past Surgical History:   Procedure Laterality Date      SECTION       LAPAROSCOPIC LYMPHADENECTOMY N/A 2024    Procedure: Laparoscopic lymphadenectomy;  Surgeon: Javier Roche MD;  Location: [facility] OR     VULVECTOMY RADICAL DISSECT GROIN(S) N/A 2024    Procedure: Radical excision of right vulvar lesion. Removal of lymph nodes from left and right groin. Biopsies of cervix.;  Surgeon: Javier Roche  MD Jitendra;  Location:  OR         Health Maintenance Due   Topic Date Due     DEXA  Never done     ADVANCE CARE PLANNING  Never done     DEPRESSION ACTION PLAN  Never done     MAMMO SCREENING  Never done     Pneumococcal Vaccine: 65+ Years (1 of 2 - PCV) Never done     COLORECTAL CANCER SCREENING  Never done     HIV SCREENING  Never done     HEPATITIS C SCREENING  Never done     ZOSTER IMMUNIZATION (1 of 2) Never done     LIPID  Never done     RSV VACCINE (1 - Risk 60-74 years 1-dose series) Never done     COVID-19 Vaccine (3 - Pfizer risk series) 11/22/2021     MEDICARE ANNUAL WELLNESS VISIT  Never done     COLPOSCOPY  Never done     INFLUENZA VACCINE (1) 09/01/2024       Current Medications:     Current Outpatient Medications   Medication Sig Dispense Refill     dexAMETHasone (DECADRON) 4 MG tablet Take 2 tablets (8 mg) by mouth daily Take for 3 days, starting the day after chemo on day 2 and 9. Take with food. If no nausea and vomiting with first cisplatin doses, may stop dexamethasone. (Patient not taking: Reported on 9/16/2024) 12 tablet 2     magnesium oxide (MAG-OX) 400 MG tablet Take 1 tablet (400 mg) by mouth daily. 30 tablet 0     ondansetron (ZOFRAN) 8 MG tablet Take 1 tablet (8 mg) by mouth every 8 hours as needed for nausea (vomiting) Do not take for 3 days after chemo (Patient not taking: Reported on 9/16/2024) 30 tablet 2     prochlorperazine (COMPAZINE) 5 MG tablet Take 1 tablet (5 mg) by mouth every 6 hours as needed for nausea or vomiting (Patient not taking: Reported on 9/16/2024) 30 tablet 2     traZODone (DESYREL) 100 MG tablet Take 1-2 tablets (100-200 mg) by mouth at bedtime 180 tablet 1         Allergies:      No Known Allergies     Social History:     Social History     Tobacco Use     Smoking status: Former     Types: Cigarettes     Passive exposure: Past     Smokeless tobacco: Never     Tobacco comments:     50 pack years. 8/8/24, has not had a cigarette in a couple weeks   Substance  Use Topics     Alcohol use: Yes     Comment: quart vodka/ week       History   Drug Use Unknown         Family History:     The patient's family history is notable for:    Family History   Problem Relation Age of Onset     Breast Cancer Mother         has had a couple times     Colon Cancer Father 80     Uterine Cancer No family hx of          Physical Exam:     LMP  (LMP Unknown)   There is no height or weight on file to calculate BMI.    General Appearance: alert, no distress    Eyes:  Eyes grossly normal to inspection.  No discharge or erythema, or obvious scleral/conjunctival abnormalities.    Respiratory: No audible wheeze, cough, or visible cyanosis.  No visible retractions or increased work of breathing.     Musculoskeletal: extremities non tender and without edema    Skin: no lesions or rashes on visible skin    Neurological: normal gait, no gross defects     Psychiatric: appropriate mood and affect. Mentation appears normal, affect normal/bright, judgement and insight intact, normal speech and appearance well-groomed                            The rest of a comprehensive physical examination is deferred due to video visit restrictions.        No results found for this or any previous visit (from the past 24 hours).       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  She is here today for 1 month follow up post radiation by video.    20 minutes spent on the date of the encounter doing chart review, history and exam, documentation, and further activities as noted above.      Plan:     1.)        Vulvar cancer:  Patient doing well at 1 month post-radiation follow up. Plan to follow up with radiation oncology provider next week. Dr. Roche would like this patient to receive imaging 8 weeks after radiation instead of 3 months. 8-week post-radiation PET scan is scheduled on 11/26 followed by an appointment with   Kaley on 12/12. Discussed this with patient, and she is in agreement with plan. In the meantime, educated patient that she may continue to use cavilon to protect irradiated vulvar tissue but not to add a moisturizer to minimize risk of further skin breakdown and infection. Discussed that radiation oncology will likely discuss use of vaginal dilator at upcoming appointment. Reviewed signs and symptoms for when she should contact the clinic or seek additional care. Patient to contact the clinic with any questions or concerns in the interim.      Genetic risk factors were assessed and she does not meet qualification for referral.     Labs and/or tests reviewed include:  none.                2.)        Health maintenance:  Issues addressed today include following up with PCP for annual health maintenance and non-gynecologic issues.        RAQUEL Ferrara, RN, OCN  Student Nurse Practitioner    I was with student Dinorah Flanagan throughout visit and agree with HPI, exam, and plan as written.    Silvana Marin DNP, APRN, FNP-C, AOCNP  Oncology Nurse Practitioner   Division of Gynecologic Oncology  Pager: [phone]        CC  Patient Care Team:  Brooke Li PA-C as PCP - General (Family Medicine)  Carolina Hernandez DO as Physician (OB/Gyn)  Yomaira Roche MD as MD (Gynecologic Oncology)  Carolina Hernandez DO as Assigned OBGYN Provider  Daquan Cramer, RN as Specialty Care Coordinator (Hematology & Oncology)  Caio Jimenez MD as Physician (Radiation Oncology)  Everton Jimenez MD as MD (Radiation Oncology)  Brooke Li PA-C as Assigned PCP  Fior Rai MD as Assigned Cancer Care Provider  YOMAIRA ROCHE      Again, thank you for allowing me to participate in the care of your patient.        Sincerely,        CUCO Teran CNP

## 2024-10-30 NOTE — NURSING NOTE
Current patient location: [address] [place] [address] N   [facility] [address]    Is the patient currently in the state of [place]? YES    Visit mode:VIDEO    If the visit is dropped, the patient can be reconnected by: VIDEO VISIT: Text to cell phone:   Telephone Information:   Mobile [phone]       Will anyone else be joining the visit? NO  (If patient encounters technical issues they should call [phone]    Are changes needed to the allergy or medication list? No    Are refills needed on medications prescribed by this physician? NO    Rooming Documentation:  Unable to complete questionnaire(s) due to time    Reason for visit: BENNIE Lee ALEJANDRA

## 2024-11-04 ENCOUNTER — OFFICE VISIT (OUTPATIENT)
Dept: RADIATION ONCOLOGY | Facility: CLINIC | Age: 64
End: 2024-11-04
Attending: RADIOLOGY
Payer: MEDICARE

## 2024-11-04 VITALS
SYSTOLIC BLOOD PRESSURE: 150 MMHG | DIASTOLIC BLOOD PRESSURE: 80 MMHG | BODY MASS INDEX: 22.87 KG/M2 | WEIGHT: 146 LBS | HEART RATE: 120 BPM

## 2024-11-04 DIAGNOSIS — C51.9 VULVAR CANCER (H): Primary | ICD-10-CM

## 2024-11-04 PROCEDURE — G0463 HOSPITAL OUTPT CLINIC VISIT: HCPCS | Performed by: RADIOLOGY

## 2024-11-04 PROCEDURE — 99024 POSTOP FOLLOW-UP VISIT: CPT | Performed by: RADIOLOGY

## 2024-11-04 NOTE — PROGRESS NOTES
Radiation Oncology Follow-up Visit  2024    Regina Gaspar  MRN: [id number]   : 1960     DISEASE TREATED:   Malignant neoplasm of vulva    RADIATION THERAPY DELIVERED:   Treatment Site             Current Dose         Modality      From              To       Elapsed Days      Fx.  Pelvis Groin Vulva         4,500 cGy          06 X          8/19/2024    2024       35                  25  R Groin Op Bed Bst       1,000 cGy          06 X          9/24/2024    2024         6                   5     Dose per Fraction:  Pelvis, Groin, Vulva   180cGy  Simultaneous boost vulva/R groin 200cGy  Sequential boost to right groin 200cGy     Total Dose:  Pelvis, Groin, Vulva  45Gy  R vulva, R groin  50Gy  Right groin tumor bed  60Gy    INTERVAL SINCE COMPLETION OF RADIATION THERAPY:   1 month (completed 2024)    SUBJECTIVE:   Regina Gaspar is a 64 year old female who is here today for routine 1 month follow up after completing radiation therapy. Ms. Gaspar is referred for adjuvant radiation for squamous cell carcinoma of the vulva. She initially presented with ED on 3/31/2024 with 1 month h/o foul-smelling dark yellow vaginal discharge and a lump on her right labia. Exam showed a 3 cm friable lesion. CT of the chest/abdomen/pelvis showed a 2.3 x 2.3 cm low density lymph node I the right groin, but otherwise unremarkable. She then saw Dr. Hernandez in Ob/Gyn on 2024. Bx of the vulva mass was consistent with well differentiated squamous cell carcinoma, HPV 16 positive. Cervical pap was negative for intraepithelial lesion.      Regina established care with Dr. Roche. PET/CT on 2024 revealed the vulva lesion to measure 3.2 x 3.6 x 0.8 cm, with a max SUV of 10.37. Additionally, there was a 2.8 x 2.5 cm necrotic right inguinal node with max SUV of 3.58 and a 6 x 12 mm right pelvic side wall node with max SUV of 3.5. A prominent portacaval node measuring 7 mm x 19 mm with max SUV of 2.93 was  felt to be reactive.      She proceeded with radical right vulvectomy and lymph node sampling on 5/28/2024. Intra-op findings   included grossly abnormal right vulva with a 4 x 4 cm mass involving the mid portion of the labia majora and minora. Enlarged right groin lymph node, enlarged obturator space node. The right obturator node was removed via laparoscopic procedure. 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 LVSI was not identified. All margins were greater than 10 mm for invasive, H/CHERYLE or dVIN. R inguinal dissection had 3 of 6 positive nodes, with the largest 3.6 cm without DRE. L inguinal dissection 0/3. Right pelvic LND 0/4. Final stage [id number], FIGO IIIB.      Adjuvant chemoradiation was recommended for improved local control. She received 45Gy in 25 fractions to the pelvis via IMRT. The vulva and right groin received a simultaneous boost to 50Gy in 25 fractions. The right groin surgical bed then received an additional sequential boost 10Gy in 5 fractions for a total dose of 60Gy. She tolerated EBRT well. Her EBRT was complicated by diarrhea for which she used Imodium and dermatitis for which she applied skin barrier cream Cavilon. She never developed much pain or significant dermatitis. She had mild fatigue which required more rest.     Since completing the radiation therapy, Regina had a virtual follow up visit with Gyn Onc, Ms. Silvana Marin. She is scheduled for a PET/CT on 11/26 and will see Dr. Roche for follow up on 12/12/2024.     INTERVAL HISTORY:  Today, Ms. Gaspar is here for routine 1 month follow-up after completion of her radiation therapy (completed 10/30/2024). She report no nausea, diarrhea, vaginal bleeding, vaginal discharges and pain. No urinary symptoms.  She report her appetite is back to baseline. She still has the lingering fatigue. But  patient reported, she keeps herself physically active by walking around her apartment. She verbalized that she listen to her body when she needed to take some naps during the day. She reported still have some dryness and flaking in the treatment field, but no open or sensitive areas. She reported that she is no longer using the cavilon barrier cream.    ROS:  Complete review of systems is negative except for symptoms discussed in subjective above.    Current Outpatient Medications   Medication Sig Dispense Refill    Calcium-Magnesium-Zinc 333-133-5 MG TABS per tablet Take 1 tablet by mouth daily.      traZODone (DESYREL) 100 MG tablet Take 1-2 tablets (100-200 mg) by mouth at bedtime 180 tablet 1     No current facility-administered medications for this visit.      No Known Allergies    Past Medical History:   Diagnosis Date    Vulvar cancer (H) 05/2024     PHYSICAL EXAM:  BP (!) 150/80   Pulse 120   Wt 66.2 kg (146 lb)   LMP  (LMP Unknown)   BMI 22.87 kg/m    Gen: Alert, in NAD  Neck::  Supple.  No masses or lymphaednopathy palpated.  Pulm: Lung sound audible and clear. No wheezing, stridor or respiratory distress  CV: Heart sounds audible with RRR. Well-perfused, no cyanosis, no pedal edema  Skin: Dryness to bilateral groin and bilateral upper inner thigh. Mild erythema and skin dryness to external majora. Mild erythema near urethral opening  Psychiatric: Appropriate mood and affect  Extremities: No visible edema of the upper extremities. Full ROM at the shoulders and elbows.  Neuro: Alert and oriented, grossly non focal. Normal speech. Moving upper extremities equally.  Skin: No visible jaundice. No suspicious lesions of the visualized integuments. Groin area with     LABS AND IMAGING:  Reviewed.    IMPRESSION:   Ms. Gaspar is a 64 year old female with a malignant neoplasm of vulva who is here today for routine 1 month follow-up after the radiation. Patient is healing well with the acute side-effects of the  radiation therapy, with very minimal skin changes such as mild erythema to groin and some parts of her thigh, as well as some mild erythema near the urethral opening with no desquamation.      PLAN:   Patient has recovered nicely from acute side effects of radiation therapy. Apply cavelon cream barrier thinly, to the mild erythema near urethral opening  Follow-up with Gyn-onc team as scheduled. She will see Dr. Roche 12/12. Gyn-onc will continue to provide monitoring/surveillance and imaging.  Discussed with the patient healthy lifestyle:  -Physical activity for a minimum of 150 minutes/week with a goal of 300 minutes/week.  -Healthy diet including lots of fruits and vegetables.  -Maintain healthy weight.  -Do not use tobacco products.  -Be sure to get adequate amount of sleep  -Consume alcohol rarely or not at all  -See primary care provider yearly for routine age-related screenings and immunization.  Discussed with the post-radiation side effects:  -Vaginal stenosis: Narrowing and shortening of the vagina may occur after pelvic radiation and/or brachytherapy. Using a vaginal dilator or having intercourse 3-5 times weekly may help prevent stenosis. Use liberal amounts of lubricant with dilator use and/or intercourse to prevent pain, tearing, and bleeding. Dilator use and/or intercourse should be maintained throughout the rest of your life. Even if you don't wish to be sexually active, your dilator should be used to allow for adequate pelvic exams.  -Radiation proctitis: Irritation and inflammation of your bowel and rectum may occur after radiation. Let your team know if you have painful bowel movements or bleeding from your rectum.  -Radiation cystitis: Radiation can cause irritation and inflammation of the bladder. Let your team know if you have painful urination or blood in your urine.  -Fistula formation: Radiation can cause a weakening in your tissues that can allow for passages to form where they shouldn't.  Let your team know if you experience stooling through your vagina or a persistent leakage of urine.  -Osteoporosis: Pelvic radiation can cause your bones to thin in your pelvis which can put you at risk for hip fractures. A DEXA scan may be done to evaluate your bone density. Will defer PCP to order DEXA scan.  To follow-up with Radiation Oncology as needed    CUCO Munoz, CNP  Department of Radiation Oncology  [facility]  Pager: [phone]    CC:  Patient Care Team:  Brooke Li PA-C as PCP - General (Family Medicine)  Carolina Hernandez DO as Physician (OB/Gyn)  Javier Roche MD as MD (Gynecologic Oncology)  Carolina Hernandez DO as Assigned OBGYN Provider  Daquan Cramer, RN as Specialty Care Coordinator (Hematology & Oncology)  Caio Jimenez MD as Physician (Radiation Oncology)  Everton Jimenez MD as MD (Radiation Oncology)  Brooke Li PA-C as Assigned PCP  Fior Rai MD as Assigned Cancer Care Provider

## 2024-11-04 NOTE — PROGRESS NOTES
FOLLOW-UP VISIT    Patient Name: Regina Gaspar      : 1960     Age: 64 year old        ______________________________________________________________________________     Chief Complaint   Patient presents with    Cancer     Radiation follow up     BP (!) 150/80   Pulse 120   Wt 66.2 kg (146 lb)   LMP  (LMP Unknown)   BMI 22.87 kg/m       Date Radiation Completed: Vulvar region 6000 cGy completed 24    Pain  Denies    Labs  Other Labs: No    Imaging  None    Diarrhea: 0- None    Constipation: 0- None    Nausea: 0- None    Vomitin- No vomiting    Genitourinary system: 0- Normal    Dysuria: 0- None    Vaginal dilator use: No dilator given yet.    Other Appointments: No    Residual Radiation side effect: Energy continues to be a little low.  Skin is pealing some, no discomfort or itching in the area of treatment.

## 2024-11-04 NOTE — LETTER
2024      Regina Gaspar  [address]  [facility] [address]      Dear Colleague,    Thank you for referring your patient, Regina Gaspar, to the [facility] RADIATION ONCOLOGY. Please see a copy of my visit note below.    Department of Therapeutic Radiology--Radiation Oncology                   [place] [address]  [address] [place]  [address]  Office:  [phone]  Fax:  [phone]   Radiation Oncology Clinic  [address] [place] [address]  Phone:  [phone]  Fax:  [phone]     RE: Regina Gaspar : 1960   MRN: [id number] WILFREDO: 2024     OUTPATIENT VISIT NOTE       DIAGNOSIS: Squamous cell carcinoma of the vulva, [id number], FIGO IIIB    AREAS TREATED: She received 45Gy in 25 fractions to the pelvis via IMRT. The vulva and right groin received a simultaneous boost to 50Gy in 25 fractions. The right groin surgical bed then received an additional sequential boost 10Gy in 5 fractions for a total dose of 60Gy               INTERVAL SINCE COMPLETION OF RADIATION THERAPY: 1 month (she completed the treatment on 2024)    SUBJECTIVE: Ms. Gaspar is a 65yo female referred for adjuvant radiation for squamous cell carcinoma of the vulva. She initially presented with ED on 3/31/2024 with 1 month h/o foul-smelling dark yellow vaginal discharge and a lump on her right labia. Exam showed a 3 cm friable lesion. CT of the chest/abdomen/pelvis showed a 2.3 x 2.3 cm low density lymph node I the right groin, but otherwise unremarkable. She then saw Dr. Hernandez in Ob/Gyn on 2024. Bx of the vulva mass was consistent with well differentiated squamous cell carcinoma, HPV 16 positive. Cervical pap was negative for intraepithelial lesion.      Regina established care with Dr. Roche. PET/CT on 2024 revealed the vulva lesion to measure 3.2 x 3.6 x 0.8 cm, with a max SUV of 10.37. Additionally, there was a 2.8 x 2.5 cm necrotic right inguinal node with  max SUV of 3.58 and a 6 x 12 mm right pelvic side wall node with max SUV of 3.5. A prominent portacaval node measuring 7 mm x 19 mm with max SUV of 2.93 was felt to be reactive.      She proceeded with radical right vulvectomy and lymph node sampling on 5/28/2024. Intra-op findings   included grossly abnormal right vulva with a 4 x 4 cm mass involving the mid portion of the labia majora and minora. Enlarged right groin lymph node, enlarged obturator space node. The right obturator node was removed via laparoscopic procedure. 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 LVSI was not identified. All margins were greater than 10 mm for invasive, H/CHERYLE or dVIN. R inguinal dissection had 3 of 6 positive nodes, with the largest 3.6 cm without DRE. L inguinal dissection 0/3. Right pelvic LND 0/4. Final stage [id number], FIGO IIIB.      Adjuvant chemoradiation was recommended for improved local control. She received 45Gy in 25 fractions to the pelvis via IMRT. The vulva and right groin received a simultaneous boost to 50Gy in 25 fractions. The right groin surgical bed then received an additional sequential boost 10Gy in 5 fractions for a total dose of 60Gy. She tolerated EBRT well. Her EBRT was complicated by diarrhea for which she used Imodium and dermatitis for which she applied skin barrier cream Cavilon. She never developed much pain or significant dermatitis. She had mild fatigue which required more rest.    Since completing the radiation therapy, Regina had a virtual follow up visit with Gyn Onc, Ms. Silvana Marin. She is scheduled for a PET/CT on 11/26 and will see Dr. Roche for follow up on 12/12/2024.     Regina is here for a routine 1 month follow up visit. On interview, Regina states that she is doing very well. She didn't have much pain during the radiation therapy and  continues to deny pain in the vulva. She has no bowel or urinary issues.       OBJECTIVE:   BP (!) 150/80   Pulse 120   Wt 66.2 kg (146 lb)   LMP  (LMP Unknown)   BMI 22.87 kg/m     Gen: Appears well, NAD  CV: well perfused  Resp: breathing comfortably on room air  Pelvis: Dry desquamation in bilateral groin, healing, no weeping. The vulva is mildly erythematous. The introitus is difficult to visualize due to the anatomy change from vulvectomy. On digital exam, there is some tissue thickening at the right aspect of the introitus. The vagina can accommodate a 2 cm diameter dilator.   Groin: no palpable groin adenopathy    ASSESSMENT AND PLAN: In summary, Regina is a 63 yo female with a FIGO IIIB, [id number] SCC of the vulva, s/p radical vulvectomy and LN sampling, 1 month s/p adjuvant radiation therapy with concurrent Cisplatin.     She tolerated the adjuvant therapy exceedingly well. Her dermatitis in the groin is healing nicely.     We discussed the importance of using a vaginal dilator to prevent vaginal stenosis. A small and extra small plus dilator were provided today with instruction.     Regina has a PET/CT scheduled on  and will see Dr. Roche on 2024. She can be seen in our clinic as needed.          Fior Rai M.D./Ph.D.  Radiation Oncologist   Department of Therapeutic Radiology   [facility]  Phone: [phone]            20 minutes were spent on the date of the encounter doing chart review, history and exam, documentation and further activities as noted above.           Fior Rai MD       [facility]   RADIATION ONCOLOGY OUTPATIENT FOLLOW UP NOTE    Patient Name: Regina Gaspar  MRN: [id number]  : 1960  Surgical Oncologist: Javier Roche  Attending Radiation Oncologist: Adrian Rai      Date: 2024       Diagnosis: vulvar squamous cell carcinoma, [id number], FIGO IIIB    Treatment History:  The pelvis was treated  to 45 Gy in 25 fx with SIB of the vulva and right groin to 50 Gy followed by a 10 Gy boost to the right groin surgical bed (60 Gy total dose). The final treatment was on 2024 (~1 month ago).                   History of Present Illness:  *** treatment summary?    Today, she reports ***      Review of Systems: As per HPI with any specific additions below; otherwise, a 10 system review is unremarkable      Past Medical History:   Diagnosis Date     Vulvar cancer (H) 2024        Past Surgical History:   Procedure Laterality Date      SECTION       LAPAROSCOPIC LYMPHADENECTOMY N/A 2024    Procedure: Laparoscopic lymphadenectomy;  Surgeon: Javier Roche MD;  Location: [facility] OR     VULVECTOMY RADICAL DISSECT GROIN(S) N/A 2024    Procedure: Radical excision of right vulvar lesion. Removal of lymph nodes from left and right groin. Biopsies of cervix.;  Surgeon: Javier Roche MD;  Location: [facility] OR        Current Medications:    Current Outpatient Medications:      dexAMETHasone (DECADRON) 4 MG tablet, Take 2 tablets (8 mg) by mouth daily Take for 3 days, starting the day after chemo on day 2 and 9. Take with food. If no nausea and vomiting with first cisplatin doses, may stop dexamethasone. (Patient not taking: Reported on 2024), Disp: 12 tablet, Rfl: 2     magnesium oxide (MAG-OX) 400 MG tablet, Take 1 tablet (400 mg) by mouth daily., Disp: 30 tablet, Rfl: 0     ondansetron (ZOFRAN) 8 MG tablet, Take 1 tablet (8 mg) by mouth every 8 hours as needed for nausea (vomiting) Do not take for 3 days after chemo (Patient not taking: Reported on 2024), Disp: 30 tablet, Rfl: 2     prochlorperazine (COMPAZINE) 5 MG tablet, Take 1 tablet (5 mg) by mouth every 6 hours as needed for nausea or vomiting (Patient not taking: Reported on 2024), Disp: 30 tablet, Rfl: 2     traZODone (DESYREL) 100 MG tablet, Take 1-2 tablets (100-200 mg) by mouth at bedtime, Disp: 180 tablet, Rfl:  "1    Allergies:  No Known Allergies       Physical Exam:  KPS: ***  ECOG: {ECOG PS 4-:424247}  LMP  (LMP Unknown) , Pain Score Data Unavailable/10  General: Alert and in no acute distress.  HEENT: Normocephalic, atraumatic. No scleral icterus.  Neck: Apparent full range of motion.  CV: Appears well-perfused, with no visible cyanosis. Regular rate and rhythm.  Lungs: Breathing easily on room air, with no difficulty completing full sentences  Abdomen: Soft and nontender  Extremities:  Warm and well-perfused, no edema. Full range of motion at the shoulders and elbows.    Skin: No visible jaundice. No suspicious lesions of the visualized integuments.  Psych: Mood and affect are appropriate to given situation. Answers questions appropriately.    ***    Imaging:  No interval imaging      Assessment: *** stage ***, metastatic to ***    Plan: We recommend ***    Victoriano Alvarado MD PhD PGY2  Department of Radiation Oncology  [facility]      Oncology Rooming Note    November 4, 2024 1:03 PM   Regina Gaspar is a 64 year old female who presents for:    Chief Complaint   Patient presents with     Cancer     Radiation follow up     Initial Vitals: BP (!) 150/80   Pulse 120   Wt 66.2 kg (146 lb)   LMP  (LMP Unknown)   BMI 22.87 kg/m   Estimated body mass index is 22.87 kg/m  as calculated from the following:    Height as of 10/30/24: 1.702 m (5' 7\").    Weight as of this encounter: 66.2 kg (146 lb). Body surface area is 1.77 meters squared.  Data Unavailable Comment: Data Unavailable   No LMP recorded (lmp unknown). Patient is postmenopausal.  Allergies reviewed: Yes  Medications reviewed: Yes    Medications: Medication refills not needed today.  Pharmacy name entered into [employer]: [facility] - [place], [place] - [address]    Frailty Screening:   Is the patient here for a new oncology consult visit in cancer care? 2. No      Clinical concerns: Here for follow up      Melita Siu, " RN                FOLLOW-UP VISIT    Patient Name: Regina Gaspar      : 1960     Age: 64 year old        ______________________________________________________________________________     Chief Complaint   Patient presents with     Cancer     Radiation follow up     BP (!) 150/80   Pulse 120   Wt 66.2 kg (146 lb)   LMP  (LMP Unknown)   BMI 22.87 kg/m       Date Radiation Completed: Vulvar region 6000 cGy completed 24    Pain  Denies    Labs  Other Labs: No    Imaging  None    Diarrhea: 0- None    Constipation: 0- None    Nausea: 0- None    Vomitin- No vomiting    Genitourinary system: 0- Normal    Dysuria: 0- None    Vaginal dilator use: No dilator given yet.    Other Appointments: No    Residual Radiation side effect: Energy continues to be a little low.  Skin is pealing some, no discomfort or itching in the area of treatment.                Radiation Oncology Follow-up Visit  2024    Regina Gaspar  MRN: [id number]   : 1960     DISEASE TREATED:   Malignant neoplasm of vulva    RADIATION THERAPY DELIVERED:   Treatment Site             Current Dose         Modality      From              To       Elapsed Days      Fx.  Pelvis Groin Vulva         4,500 cGy          06 X          8/19/2024    2024       35                  25  R Groin Op Bed Bst       1,000 cGy          06 X          9/24/2024    2024         6                   5     Dose per Fraction:  Pelvis, Groin, Vulva   180cGy  Simultaneous boost vulva/R groin 200cGy  Sequential boost to right groin 200cGy     Total Dose:  Pelvis, Groin, Vulva  45Gy  R vulva, R groin  50Gy  Right groin tumor bed  60Gy    INTERVAL SINCE COMPLETION OF RADIATION THERAPY:   1 month (completed 2024)    SUBJECTIVE:   Regina Gaspar is a 64 year old female who is here today for routine 1 month follow up after completing radiation therapy. Ms. Gaspar is referred for adjuvant radiation for squamous cell carcinoma of the vulva.  She initially presented with ED on 3/31/2024 with 1 month h/o foul-smelling dark yellow vaginal discharge and a lump on her right labia. Exam showed a 3 cm friable lesion. CT of the chest/abdomen/pelvis showed a 2.3 x 2.3 cm low density lymph node I the right groin, but otherwise unremarkable. She then saw Dr. Hernandez in Ob/Gyn on 4/5/2024. Bx of the vulva mass was consistent with well differentiated squamous cell carcinoma, HPV 16 positive. Cervical pap was negative for intraepithelial lesion.      Regina established care with Dr. Roche. PET/CT on 4/29/2024 revealed the vulva lesion to measure 3.2 x 3.6 x 0.8 cm, with a max SUV of 10.37. Additionally, there was a 2.8 x 2.5 cm necrotic right inguinal node with max SUV of 3.58 and a 6 x 12 mm right pelvic side wall node with max SUV of 3.5. A prominent portacaval node measuring 7 mm x 19 mm with max SUV of 2.93 was felt to be reactive.      She proceeded with radical right vulvectomy and lymph node sampling on 5/28/2024. Intra-op findings   included grossly abnormal right vulva with a 4 x 4 cm mass involving the mid portion of the labia majora and minora. Enlarged right groin lymph node, enlarged obturator space node. The right obturator node was removed via laparoscopic procedure. 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 LVSI was not identified. All margins were greater than 10 mm for invasive, H/CHERYLE or dVIN. R inguinal dissection had 3 of 6 positive nodes, with the largest 3.6 cm without DRE. L inguinal dissection 0/3. Right pelvic LND 0/4. Final stage [id number], FIGO IIIB.      Adjuvant chemoradiation was recommended for improved local control. She received 45Gy in 25 fractions to the pelvis via IMRT. The vulva and right groin received a simultaneous boost to 50Gy in 25 fractions. The right groin surgical bed then received an  additional sequential boost 10Gy in 5 fractions for a total dose of 60Gy. She tolerated EBRT well. Her EBRT was complicated by diarrhea for which she used Imodium and dermatitis for which she applied skin barrier cream Cavilon. She never developed much pain or significant dermatitis. She had mild fatigue which required more rest.     Since completing the radiation therapy, Regina had a virtual follow up visit with Gyn Onc, Ms. Silvana Marin. She is scheduled for a PET/CT on 11/26 and will see Dr. Roche for follow up on 12/12/2024.     INTERVAL HISTORY:  Today, Ms. Gaspar is here for routine 1 month follow-up after completion of her radiation therapy (completed 10/30/2024). She report no nausea, diarrhea, vaginal bleeding, vaginal discharges and pain. No urinary symptoms.  She report her appetite is back to baseline. She still has the lingering fatigue. But patient reported, she keeps herself physically active by walking around her apartment. She verbalized that she listen to her body when she needed to take some naps during the day. She reported still have some dryness and flaking in the treatment field, but no open or sensitive areas. She reported that she is no longer using the cavilon barrier cream.    ROS:  Complete review of systems is negative except for symptoms discussed in subjective above.    Current Outpatient Medications   Medication Sig Dispense Refill     Calcium-Magnesium-Zinc 333-133-5 MG TABS per tablet Take 1 tablet by mouth daily.       traZODone (DESYREL) 100 MG tablet Take 1-2 tablets (100-200 mg) by mouth at bedtime 180 tablet 1     No current facility-administered medications for this visit.      No Known Allergies    Past Medical History:   Diagnosis Date     Vulvar cancer (H) 05/2024     PHYSICAL EXAM:  BP (!) 150/80   Pulse 120   Wt 66.2 kg (146 lb)   LMP  (LMP Unknown)   BMI 22.87 kg/m    Gen: Alert, in NAD  Neck::  Supple.  No masses or lymphaednopathy palpated.  Pulm: Lung sound  audible and clear. No wheezing, stridor or respiratory distress  CV: Heart sounds audible with RRR. Well-perfused, no cyanosis, no pedal edema  Skin: Dryness to bilateral groin and bilateral upper inner thigh. Mild erythema and skin dryness to external majora. Mild erythema near urethral opening  Psychiatric: Appropriate mood and affect  Extremities: No visible edema of the upper extremities. Full ROM at the shoulders and elbows.  Neuro: Alert and oriented, grossly non focal. Normal speech. Moving upper extremities equally.  Skin: No visible jaundice. No suspicious lesions of the visualized integuments. Groin area with     LABS AND IMAGING:  Reviewed.    IMPRESSION:   Ms. Gaspar is a 64 year old female with a malignant neoplasm of vulva who is here today for routine 1 month follow-up after the radiation. Patient is healing well with the acute side-effects of the radiation therapy, with very minimal skin changes such as mild erythema to groin and some parts of her thigh, as well as some mild erythema near the urethral opening with no desquamation.      PLAN:   Patient has recovered nicely from acute side effects of radiation therapy. Apply cavelon cream barrier thinly, to the mild erythema near urethral opening  Follow-up with Gyn-onc team as scheduled. She will see Dr. Roche 12/12. Gyn-onc will continue to provide monitoring/surveillance and imaging.  Discussed with the patient healthy lifestyle:  -Physical activity for a minimum of 150 minutes/week with a goal of 300 minutes/week.  -Healthy diet including lots of fruits and vegetables.  -Maintain healthy weight.  -Do not use tobacco products.  -Be sure to get adequate amount of sleep  -Consume alcohol rarely or not at all  -See primary care provider yearly for routine age-related screenings and immunization.  Discussed with the post-radiation side effects:  -Vaginal stenosis: Narrowing and shortening of the vagina may occur after pelvic radiation and/or  brachytherapy. Using a vaginal dilator or having intercourse 3-5 times weekly may help prevent stenosis. Use liberal amounts of lubricant with dilator use and/or intercourse to prevent pain, tearing, and bleeding. Dilator use and/or intercourse should be maintained throughout the rest of your life. Even if you don't wish to be sexually active, your dilator should be used to allow for adequate pelvic exams.  -Radiation proctitis: Irritation and inflammation of your bowel and rectum may occur after radiation. Let your team know if you have painful bowel movements or bleeding from your rectum.  -Radiation cystitis: Radiation can cause irritation and inflammation of the bladder. Let your team know if you have painful urination or blood in your urine.  -Fistula formation: Radiation can cause a weakening in your tissues that can allow for passages to form where they shouldn't. Let your team know if you experience stooling through your vagina or a persistent leakage of urine.  -Osteoporosis: Pelvic radiation can cause your bones to thin in your pelvis which can put you at risk for hip fractures. A DEXA scan may be done to evaluate your bone density. Will defer PCP to order DEXA scan.  To follow-up with Radiation Oncology as needed    CUCO Munoz CNP  Department of Radiation Oncology  [facility]  Pager: [phone]    CC:  Patient Care Team:  Brooke Li PA-C as PCP - General (Family Medicine)  Carolina Hernandez DO as Physician (OB/Gyn)  Javier Roche MD as MD (Gynecologic Oncology)  Carolina Hernandez DO as Assigned OBGYN Provider  Daquan Cramer RN as Specialty Care Coordinator (Hematology & Oncology)  Caio Jimenez MD as Physician (Radiation Oncology)  Everton Jimenez MD as MD (Radiation Oncology)  Brooke Li PA-C as Assigned PCP  Fior Rai MD as Assigned Cancer Care Provider        Again, thank you for allowing me to participate in the care of  your patient.        Sincerely,        Fior Rai MD

## 2024-11-04 NOTE — PROGRESS NOTES
[facility]   RADIATION ONCOLOGY OUTPATIENT FOLLOW UP NOTE    Patient Name: Regina Gaspar  MRN: [id number]  : 1960  Surgical Oncologist: Javier Roche  Attending Radiation Oncologist: Adrian Rai      Date: 2024       Diagnosis: vulvar squamous cell carcinoma, [id number], FIGO IIIB    Treatment History:  The pelvis was treated to 45 Gy in 25 fx with SIB of the vulva and right groin to 50 Gy followed by a 10 Gy boost to the right groin surgical bed (60 Gy total dose). The final treatment was on 2024 (~1 month ago).                   History of Present Illness:  *** treatment summary?    Today, she reports ***      Review of Systems: As per HPI with any specific additions below; otherwise, a 10 system review is unremarkable      Past Medical History:   Diagnosis Date    Vulvar cancer (H) 2024        Past Surgical History:   Procedure Laterality Date     SECTION      LAPAROSCOPIC LYMPHADENECTOMY N/A 2024    Procedure: Laparoscopic lymphadenectomy;  Surgeon: Javier Roche MD;  Location: [facility] OR    VULVECTOMY RADICAL DISSECT GROIN(S) N/A 2024    Procedure: Radical excision of right vulvar lesion. Removal of lymph nodes from left and right groin. Biopsies of cervix.;  Surgeon: Javier Roche MD;  Location: [facility] OR        Current Medications:    Current Outpatient Medications:     dexAMETHasone (DECADRON) 4 MG tablet, Take 2 tablets (8 mg) by mouth daily Take for 3 days, starting the day after chemo on day 2 and 9. Take with food. If no nausea and vomiting with first cisplatin doses, may stop dexamethasone. (Patient not taking: Reported on 2024), Disp: 12 tablet, Rfl: 2    magnesium oxide (MAG-OX) 400 MG tablet, Take 1 tablet (400 mg) by mouth daily., Disp: 30 tablet, Rfl: 0    ondansetron (ZOFRAN) 8 MG tablet, Take 1 tablet (8 mg) by mouth every 8 hours as needed for nausea (vomiting) Do not take for 3 days after chemo (Patient  not taking: Reported on 9/16/2024), Disp: 30 tablet, Rfl: 2    prochlorperazine (COMPAZINE) 5 MG tablet, Take 1 tablet (5 mg) by mouth every 6 hours as needed for nausea or vomiting (Patient not taking: Reported on 9/16/2024), Disp: 30 tablet, Rfl: 2    traZODone (DESYREL) 100 MG tablet, Take 1-2 tablets (100-200 mg) by mouth at bedtime, Disp: 180 tablet, Rfl: 1    Allergies:  No Known Allergies       Physical Exam:  KPS: ***  ECOG: {ECOG PS 4-:937681}  LMP  (LMP Unknown) , Pain Score Data Unavailable/10  General: Alert and in no acute distress.  HEENT: Normocephalic, atraumatic. No scleral icterus.  Neck: Apparent full range of motion.  CV: Appears well-perfused, with no visible cyanosis. Regular rate and rhythm.  Lungs: Breathing easily on room air, with no difficulty completing full sentences  Abdomen: Soft and nontender  Extremities:  Warm and well-perfused, no edema. Full range of motion at the shoulders and elbows.    Skin: No visible jaundice. No suspicious lesions of the visualized integuments.  Psych: Mood and affect are appropriate to given situation. Answers questions appropriately.    ***    Imaging:  No interval imaging      Assessment: *** stage ***, metastatic to ***    Plan: We recommend ***    Victoriano Alvarado MD PhD PGY2  Department of Radiation Oncology  [facility]

## 2024-11-04 NOTE — PROGRESS NOTES
"Oncology Rooming Note    November 4, 2024 1:03 PM   Regina Gaspar is a 64 year old female who presents for:    Chief Complaint   Patient presents with    Cancer     Radiation follow up     Initial Vitals: BP (!) 150/80   Pulse 120   Wt 66.2 kg (146 lb)   LMP  (LMP Unknown)   BMI 22.87 kg/m   Estimated body mass index is 22.87 kg/m  as calculated from the following:    Height as of 10/30/24: 1.702 m (5' 7\").    Weight as of this encounter: 66.2 kg (146 lb). Body surface area is 1.77 meters squared.  Data Unavailable Comment: Data Unavailable   No LMP recorded (lmp unknown). Patient is postmenopausal.  Allergies reviewed: Yes  Medications reviewed: Yes    Medications: Medication refills not needed today.  Pharmacy name entered into [facility]: [facility] - [place], [place] - [address]    Frailty Screening:   Is the patient here for a new oncology consult visit in cancer care? 2. No      Clinical concerns: Here for follow up      Melita Siu RN              "

## 2024-12-12 ENCOUNTER — ONCOLOGY VISIT (OUTPATIENT)
Dept: ONCOLOGY | Facility: CLINIC | Age: 64
End: 2024-12-12
Attending: OBSTETRICS & GYNECOLOGY
Payer: MEDICARE

## 2024-12-12 VITALS
BODY MASS INDEX: 21.86 KG/M2 | HEIGHT: 68 IN | DIASTOLIC BLOOD PRESSURE: 72 MMHG | HEART RATE: 92 BPM | WEIGHT: 144.2 LBS | OXYGEN SATURATION: 92 % | SYSTOLIC BLOOD PRESSURE: 113 MMHG

## 2024-12-12 DIAGNOSIS — C51.9 VULVAR CANCER (H): Primary | ICD-10-CM

## 2024-12-12 PROCEDURE — 99214 OFFICE O/P EST MOD 30 MIN: CPT | Performed by: OBSTETRICS & GYNECOLOGY

## 2024-12-12 PROCEDURE — G0463 HOSPITAL OUTPT CLINIC VISIT: HCPCS | Performed by: OBSTETRICS & GYNECOLOGY

## 2024-12-12 ASSESSMENT — PAIN SCALES - GENERAL: PAINLEVEL_OUTOF10: NO PAIN (0)

## 2024-12-12 NOTE — LETTER
2024      Regina Gaspar  [address]  [facility] [address]      Dear Colleague,    Thank you for referring your patient, Regina Gaspar, to the [facility]. Please see a copy of my visit note below.            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  She is here routine follow up post radiation.    Oncology History:  3/2024: Initially, she was seen in the ED visit 3/2024 with a vulvar lesion and some early satiety.      3/31/24 CT:  IMPRESSION:  1.  2.3 x 2.3 cm low-density lymph node or possible small fluid collection noted within the right groin. Could consider ultrasound and/or biopsy as clinically indicated.   2.  No other evidence of malignancy within the chest, abdomen, and pelvis.     24: PAP NILM, HPV 16+. Vulva, right, biopsy-  Well differentiated squamous cell carcinoma with keratinization, superficially invasive, incompletely excised     24: PET CT  IMPRESSION:   In this patient with history of vulvar squamous cell carcinoma:  1. FDG avid lesion within the vulva consistent with biopsy-proven  squamous cell carcinoma.  2. 2.8 x 2.5 cm necrotic right inguinal lymph node representing a  metastatic node.  3. 1.2 x 0.6 cm mildly avid right obturator internus/pelvic sidewall  node is indeterminate.  4. No evidence of distant metastasis.     24 Radical local excision, bilateral inguinal and right pelvic LND  A. Vulva. 12 o'clock margin, biopsy:  - Squamous epithelium with reactive changes  - Negative for dysplasia or malignancy  B. Vulva, 3 o'clock margin, biopsy:   - Squamous epithelium with reactive changes  - Negative for dysplasia or malignancy  C. Vulva, 6 o'clock margin, biopsy:  -  Squamous epithelium with reactive changes  - Negative for dysplasia or malignancy  D. Lymph node, right inguinal femoral, lymphadenectomy:  - METASTATIC SQUAMOUS CELL CARCINOMA in three of six lymph nodes (3/6)  - Largest metastatic deposit measures 3.6 cm  - No extra capsular extension identified  E. Lymph node, left inguinal femoral, lymphadenectomy:  - Two reactive lymph nodes examined, negative for malignancy (0/2)  F. Vulva, right, radical excision:  - INVASIVE KERATINIZING SQUAMOUS CELL CARCINOMA, moderately differentiated, HPV associated  - Size: 4.2 cm  - Depth of invasion 5 mm  - Surgical margins are negative for high grade dysplasia or carcinoma   G. Endocervix, curetting:  - Predominantly mucus with rare fragments of ectocervical and endocervical epithelium with reactive changes  - Negative for dysplasia or malignancy  H. Cervix, random, biopsies:  - Ectocervical and endocervical junctional tissue with reactive epithelial changes  - Negative for dysplasia or malignancy   I. Lymph node, right pelvic, lymphadenectomy:  - Four reactive lymph nodes examined, negative for malignancy (0/4)     Plan: Chemoradiation with weekly cisplatin     8/19/24: Cycle 1 day 1 cisplatin 40 mg/m2.    8/26/24: Cycle 1 day 8 cisplatin Creatinine 2.28.  Cisplatin held and given 1L D5 1/2 NS.  9/3/24: Cycle 1 day 15 cisplatin. Creatinine 2.39.  Cisplatin held and given 2L D5 1/2 NS. Creatinine down to 2.09 after fluids.  Renal US  IMPRESSION:  1.  Normal renal ultrasound without hydronephrosis or suspicious mass.  9/9/24: Cycle 1 day 22 cisplatin 20 mg/m2 due to kidney function. Creatinine 1.26.    9/16/24: Cycle 1 day 29 cisplatin 20 mg/m2  9/23/24: Cycle 1 day 36 cisplatin 30 mg/m2    Radiation Treatment Summary:  Treatment Site             Current Dose         Modality      From        To Elapsed Days Fx.  Pelvis Groin Vulva         4,500 cGy          06 X  8/19/2024  9/23/2024        35 25  R Groin Op Bed Bst       1,000 cGy           06 X  9/24/2024  2024         6  5     Dose per Fraction:  Pelvis, Groin, Vulva   180cGy  Simultaneous boost vulva/R groin 200cGy  Sequential boost to right groin 200cGy     Total Dose:  Pelvis, Groin, Vulva  45Gy  R vulva, R groin  50Gy  Right groin tumor bed  60Gy     Saw Silvana in 10/2024    2024 PET:  IMPRESSION:    1. No evidence of active hypermetabolic disease.  2. The bladder demonstrates urothelial enhancement and single foci of  air.  Differential includes recent catheterization, infection,  radiation cystitis.      ROS    Today patient reports she is doing very well.     -Tinnitus: No  -Fevers: No  -Chest pain: No  -SOB: No  -Weight loss: Unable to weigh self but feels like clothes fit the same   -Appetite: Decreased appetite x few months, eats 6-8 small meals per day, mindful of getting good nutrition   -Nausea or vomiting: No  -Abdominal pain: Reports occasional bloating and increased gas  -Diarrhea/Constipation: No, reports having regular stools  -Vaginal bleeding/discharge: No. Vulvar pain has also improved. Notes that vulvar skin feels dry and flaky. Intermittent use of cavilon skin prep to protect the skin. Wondering about whether she should moisturize this tissue.  -Neuropathy symptoms: No  -Leg swelling: No            Past Medical History:    Past Medical History:   Diagnosis Date     Vulvar cancer (H) 2024         Past Surgical History:    Past Surgical History:   Procedure Laterality Date      SECTION       LAPAROSCOPIC LYMPHADENECTOMY N/A 2024    Procedure: Laparoscopic lymphadenectomy;  Surgeon: Javier Roche MD;  Location: [facility] OR     VULVECTOMY RADICAL DISSECT GROIN(S) N/A 2024    Procedure: Radical excision of right vulvar lesion. Removal of lymph nodes from left and right groin. Biopsies of cervix.;  Surgeon: Javier Roche MD;  Location: [facility] OR         Health Maintenance Due   Topic Date Due     DEXA  Never done     ADVANCE CARE  "PLANNING  Never done     DEPRESSION ACTION PLAN  Never done     MAMMO SCREENING  Never done     Pneumococcal Vaccine: 65+ Years (1 of 2 - PCV) Never done     COLORECTAL CANCER SCREENING  Never done     HIV SCREENING  Never done     HEPATITIS C SCREENING  Never done     ZOSTER IMMUNIZATION (1 of 2) Never done     LIPID  Never done     RSV VACCINE (1 - Risk 60-74 years 1-dose series) Never done     COVID-19 Vaccine (3 - Pfizer risk series) 11/22/2021     MEDICARE ANNUAL WELLNESS VISIT  Never done     COLPOSCOPY  Never done     INFLUENZA VACCINE (1) 09/01/2024     DEPRESSION 6 MO INDEX REPEAT PHQ-9  11/17/2024       Current Medications:     Current Outpatient Medications   Medication Sig Dispense Refill     Calcium-Magnesium-Zinc 333-133-5 MG TABS per tablet Take 1 tablet by mouth daily.       traZODone (DESYREL) 100 MG tablet Take 1-2 tablets (100-200 mg) by mouth at bedtime 180 tablet 1         Allergies:      No Known Allergies     Social History:     Social History     Tobacco Use     Smoking status: Former     Types: Cigarettes     Passive exposure: Past     Smokeless tobacco: Never     Tobacco comments:     50 pack years. 8/8/24, has not had a cigarette in a couple weeks   Substance Use Topics     Alcohol use: Yes     Comment: quart vodka/ week       History   Drug Use Unknown         Family History:     The patient's family history is notable for:    Family History   Problem Relation Age of Onset     Breast Cancer Mother         has had a couple times     Colon Cancer Father 80     Uterine Cancer No family hx of          Physical Exam:     /72 (BP Location: Right arm)   Pulse 92   Ht 1.727 m (5' 8\")   Wt 65.4 kg (144 lb 3.2 oz)   LMP  (LMP Unknown)   SpO2 92%   BMI 21.93 kg/m       General Appearance: alert, no distress    Eyes:  Eyes grossly normal to inspection.  No discharge or erythema, or obvious scleral/conjunctival abnormalities.    Respiratory: No audible wheeze, cough, or visible cyanosis.  " No visible retractions or increased work of breathing.     Musculoskeletal: extremities non tender and without edema    Skin: no lesions or rashes on visible skin    Neurological: normal gait, no gross defects     Psychiatric: appropriate mood and affect. 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     30 minutes spent on the date of the encounter doing chart review, history and exam, documentation, and further activities as noted above.      Plan:     1.)        Vulvar cancer:  Patient doing well at 3 months post-radiation. PE scna negative for evidence of disease.  Plan to follow up q3-4 months.    Discussed use of vaginal dilator. Reviewed signs and symptoms for when she should contact the clinic or seek additional care. Patient to contact the clinic with any questions or concerns in the interim.      Genetic risk factors were assessed and she does not meet qualification for referral.     Labs and/or tests reviewed include:  none.                2.)        Health maintenance:  Issues addressed today include following up with PCP for annual health maintenance and non-gynecologic issues.      Javier Roche MD\    CC  Patient Care Team:  Brooke Li PA-C as PCP - General (Family Medicine)  Carolina Hernandez DO as Physician (OB/Gyn)  Javier Roche MD as MD (Gynecologic Oncology)  Carolina Hernandez DO as Assigned OBGYN Provider  Daquan Cramer RN as Specialty Care Coordinator (Hematology & Oncology)  Caio Jimenez MD as Physician (Radiation Oncology)  Everton Jimenez MD as MD (Radiation Oncology)  Guillermo  Brooke SOMERS PA-C as Assigned PCP  Fior Rai MD as Assigned Cancer Care Provider  JAVIER ROCHE      Again, thank you for allowing me to participate in the care of your patient.        Sincerely,        Javier Roche MD    Electronically signed

## 2024-12-12 NOTE — PROGRESS NOTES
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  She is here today for 1 month follow up post radiation by video.     Oncology History:  3/2024: Initially, she was seen in the ED visit 3/2024 with a vulvar lesion and some early satiety.      3/31/24 CT:  IMPRESSION:  1.  2.3 x 2.3 cm low-density lymph node or possible small fluid collection noted within the right groin. Could consider ultrasound and/or biopsy as clinically indicated.   2.  No other evidence of malignancy within the chest, abdomen, and pelvis.     24: PAP NILM, HPV 16+. Vulva, right, biopsy-  Well differentiated squamous cell carcinoma with keratinization, superficially invasive, incompletely excised     24: PET CT  IMPRESSION:   In this patient with history of vulvar squamous cell carcinoma:  1. FDG avid lesion within the vulva consistent with biopsy-proven  squamous cell carcinoma.  2. 2.8 x 2.5 cm necrotic right inguinal lymph node representing a  metastatic node.  3. 1.2 x 0.6 cm mildly avid right obturator internus/pelvic sidewall  node is indeterminate.  4. No evidence of distant metastasis.     24 Radical local excision, bilateral inguinal and right pelvic LND  A. Vulva. 12 o'clock margin, biopsy:  - Squamous epithelium with reactive changes  - Negative for dysplasia or malignancy  B. Vulva, 3 o'clock margin, biopsy:   - Squamous epithelium with reactive changes  - Negative for dysplasia or malignancy  C. Vulva, 6 o'clock margin, biopsy:  - Squamous epithelium with reactive changes  - Negative for dysplasia or malignancy  D. Lymph node, right inguinal femoral, lymphadenectomy:  - METASTATIC SQUAMOUS CELL CARCINOMA in three of six lymph nodes (3/6)  -  Largest metastatic deposit measures 3.6 cm  - No extra capsular extension identified  E. Lymph node, left inguinal femoral, lymphadenectomy:  - Two reactive lymph nodes examined, negative for malignancy (0/2)  F. Vulva, right, radical excision:  - INVASIVE KERATINIZING SQUAMOUS CELL CARCINOMA, moderately differentiated, HPV associated  - Size: 4.2 cm  - Depth of invasion 5 mm  - Surgical margins are negative for high grade dysplasia or carcinoma   G. Endocervix, curetting:  - Predominantly mucus with rare fragments of ectocervical and endocervical epithelium with reactive changes  - Negative for dysplasia or malignancy  H. Cervix, random, biopsies:  - Ectocervical and endocervical junctional tissue with reactive epithelial changes  - Negative for dysplasia or malignancy   I. Lymph node, right pelvic, lymphadenectomy:  - Four reactive lymph nodes examined, negative for malignancy (0/4)     Plan: Chemoradiation with weekly cisplatin     8/19/24: Cycle 1 day 1 cisplatin 40 mg/m2.    8/26/24: Cycle 1 day 8 cisplatin Creatinine 2.28.  Cisplatin held and given 1L D5 1/2 NS.  9/3/24: Cycle 1 day 15 cisplatin. Creatinine 2.39.  Cisplatin held and given 2L D5 1/2 NS. Creatinine down to 2.09 after fluids.  Renal US  IMPRESSION:  1.  Normal renal ultrasound without hydronephrosis or suspicious mass.  9/9/24: Cycle 1 day 22 cisplatin 20 mg/m2 due to kidney function. Creatinine 1.26.    9/16/24: Cycle 1 day 29 cisplatin 20 mg/m2  9/23/24: Cycle 1 day 36 cisplatin 30 mg/m2    Radiation Treatment Summary:  Treatment Site             Current Dose         Modality      From        To Elapsed Days Fx.  Pelvis Groin Vulva         4,500 cGy          06 X  8/19/2024  9/23/2024        35 25  R Groin Op Bed Bst       1,000 cGy          06 X  9/24/2024  9/30/2024         6  5     Dose per Fraction:  Pelvis, Groin, Vulva   180cGy  Simultaneous boost vulva/R groin 200cGy  Sequential boost to right groin 200cGy     Total Dose:  Pelvis, Groin,  Vulva  45Gy  R vulva, R groin  50Gy  Right groin tumor bed  60Gy         Today patient reports she is doing very well. Her main symptom is increased fatigue but is manageable. Continues to take mag, calcium, zinc supplement.    -Tinnitus: No  -Fevers: No  -Chest pain: No  -SOB: No  -Weight loss: Unable to weigh self but feels like clothes fit the same   -Appetite: Decreased appetite x few months, eats 6-8 small meals per day, mindful of getting good nutrition   -Nausea or vomiting: No  -Abdominal pain: Reports occasional bloating and increased gas  -Diarrhea/Constipation: No, reports having regular stools  -Vaginal bleeding/discharge: No. Vulvar pain has also improved. Notes that vulvar skin feels dry and flaky. Intermittent use of cavilon skin prep to protect the skin. Wondering about whether she should moisturize this tissue.  -Neuropathy symptoms: No  -Leg swelling: No            Past Medical History:    Past Medical History:   Diagnosis Date    Vulvar cancer (H) 2024         Past Surgical History:    Past Surgical History:   Procedure Laterality Date     SECTION      LAPAROSCOPIC LYMPHADENECTOMY N/A 2024    Procedure: Laparoscopic lymphadenectomy;  Surgeon: Javier Roche MD;  Location: [facility] OR    VULVECTOMY RADICAL DISSECT GROIN(S) N/A 2024    Procedure: Radical excision of right vulvar lesion. Removal of lymph nodes from left and right groin. Biopsies of cervix.;  Surgeon: Javier Roche MD;  Location:  OR         Health Maintenance Due   Topic Date Due    DEXA  Never done    ADVANCE CARE PLANNING  Never done    DEPRESSION ACTION PLAN  Never done    MAMMO SCREENING  Never done    Pneumococcal Vaccine: 65+ Years (1 of 2 - PCV) Never done    COLORECTAL CANCER SCREENING  Never done    HIV SCREENING  Never done    HEPATITIS C SCREENING  Never done    ZOSTER IMMUNIZATION (1 of 2) Never done    LIPID  Never done    RSV VACCINE (1 - Risk 60-74 years 1-dose series) Never done     COVID-19 Vaccine (3 - Pfizer risk series) 11/22/2021    MEDICARE ANNUAL WELLNESS VISIT  Never done    COLPOSCOPY  Never done    INFLUENZA VACCINE (1) 09/01/2024    DEPRESSION 6 MO INDEX REPEAT PHQ-9  11/17/2024       Current Medications:     Current Outpatient Medications   Medication Sig Dispense Refill    Calcium-Magnesium-Zinc 333-133-5 MG TABS per tablet Take 1 tablet by mouth daily.      traZODone (DESYREL) 100 MG tablet Take 1-2 tablets (100-200 mg) by mouth at bedtime 180 tablet 1         Allergies:      No Known Allergies     Social History:     Social History     Tobacco Use    Smoking status: Former     Types: Cigarettes     Passive exposure: Past    Smokeless tobacco: Never    Tobacco comments:     50 pack years. 8/8/24, has not had a cigarette in a couple weeks   Substance Use Topics    Alcohol use: Yes     Comment: quart vodka/ week       History   Drug Use Unknown         Family History:     The patient's family history is notable for:    Family History   Problem Relation Age of Onset    Breast Cancer Mother         has had a couple times    Colon Cancer Father 80    Uterine Cancer No family hx of          Physical Exam:     LMP  (LMP Unknown)   There is no height or weight on file to calculate BMI.    General Appearance: alert, no distress    Eyes:  Eyes grossly normal to inspection.  No discharge or erythema, or obvious scleral/conjunctival abnormalities.    Respiratory: No audible wheeze, cough, or visible cyanosis.  No visible retractions or increased work of breathing.     Musculoskeletal: extremities non tender and without edema    Skin: no lesions or rashes on visible skin    Neurological: normal gait, no gross defects     Psychiatric: appropriate mood and affect. Mentation appears normal, affect normal/bright, judgement and insight intact, normal speech and appearance well-groomed                            The rest of a comprehensive physical examination is deferred due to video visit  restrictions.        No results found for this or any previous visit (from the past 24 hours).       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  She is here today for 1 month follow up post radiation by video.    20 minutes spent on the date of the encounter doing chart review, history and exam, documentation, and further activities as noted above.      Plan:     1.)        Vulvar cancer:  Patient doing well at 1 month post-radiation follow up. Plan to follow up with radiation oncology provider next week. Dr. Roche would like this patient to receive imaging 8 weeks after radiation instead of 3 months. 8-week post-radiation PET scan is scheduled on 11/26 followed by an appointment with Dr. Roche on 12/12. Discussed this with patient, and she is in agreement with plan. In the meantime, educated patient that she may continue to use cavilon to protect irradiated vulvar tissue but not to add a moisturizer to minimize risk of further skin breakdown and infection. Discussed that radiation oncology will likely discuss use of vaginal dilator at upcoming appointment. Reviewed signs and symptoms for when she should contact the clinic or seek additional care. Patient to contact the clinic with any questions or concerns in the interim.      Genetic risk factors were assessed and she does not meet qualification for referral.     Labs and/or tests reviewed include:  none.                2.)        Health maintenance:  Issues addressed today include following up with PCP for annual health maintenance and non-gynecologic issues.        MADI FerraraN, RN, OCN  Student Nurse Practitioner    I was with student Dinorah Flanagan throughout visit and agree with HPI, exam, and plan as written.    Silvana Marin, DNP, APRN, FNP-C, AOCNP  Oncology Nurse Practitioner   Division of Gynecologic Oncology  Pager: [phone]        CC  Patient Care  Team:  Brooke Li PA-C as PCP - General (Family Medicine)  Carolina Hernandez DO as Physician (OB/Gyn)  Yomaira Roche MD as MD (Gynecologic Oncology)  Carolina Hernandez DO as Assigned OBGYN Provider  Daquan Cramer, RN as Specialty Care Coordinator (Hematology & Oncology)  Caio Jimenez MD as Physician (Radiation Oncology)  Everton Jimenez MD as MD (Radiation Oncology)  Brooke Li PA-C as Assigned PCP  Fior Rai MD as Assigned Cancer Care Provider  YOMAIRA ROCHE

## 2024-12-12 NOTE — NURSING NOTE
"Oncology Rooming Note    December 12, 2024 2:13 PM   Regina Gaspar is a 64 year old female who presents for:    Chief Complaint   Patient presents with    Oncology Clinic Visit     Initial Vitals: /72 (BP Location: Right arm)   Pulse 92   Ht 1.727 m (5' 8\")   Wt 65.4 kg (144 lb 3.2 oz)   LMP  (LMP Unknown)   SpO2 92%   BMI 21.93 kg/m   Estimated body mass index is 21.93 kg/m  as calculated from the following:    Height as of this encounter: 1.727 m (5' 8\").    Weight as of this encounter: 65.4 kg (144 lb 3.2 oz). Body surface area is 1.77 meters squared.  No Pain (0) Comment: Data Unavailable   No LMP recorded (lmp unknown). Patient is postmenopausal.  Allergies reviewed: Yes  Medications reviewed: Yes    Medications: Medication refills not needed today.  Pharmacy name entered into [employer] [facility] - [place], [place] - [address]    Frailty Screening:   Is the patient here for a new oncology consult visit in cancer care? 2. No      Clinical concerns: No Concerns        Paola Malave MA            "

## 2025-01-16 DIAGNOSIS — G47.00 INSOMNIA, UNSPECIFIED TYPE: ICD-10-CM

## 2025-01-16 RX ORDER — TRAZODONE HYDROCHLORIDE 100 MG/1
100-200 TABLET ORAL AT BEDTIME
Qty: 180 TABLET | Refills: 0 | Status: SHIPPED | OUTPATIENT
Start: 2025-01-16

## 2025-04-19 DIAGNOSIS — G47.00 INSOMNIA, UNSPECIFIED TYPE: ICD-10-CM

## 2025-04-21 RX ORDER — TRAZODONE HYDROCHLORIDE 100 MG/1
100-200 TABLET ORAL AT BEDTIME
Qty: 180 TABLET | Refills: 0 | Status: SHIPPED | OUTPATIENT
Start: 2025-04-21

## 2025-04-22 ENCOUNTER — TELEPHONE (OUTPATIENT)
Dept: ONCOLOGY | Facility: CLINIC | Age: 65
End: 2025-04-22
Payer: COMMERCIAL

## 2025-04-22 NOTE — TELEPHONE ENCOUNTER
This patient was a no show for an appointment with Dr Roche. I called and left 3 voicemail's for the patient to reschedule the follow up appointment.-cap-04/22/2025

## 2025-08-15 DIAGNOSIS — G47.00 INSOMNIA, UNSPECIFIED TYPE: ICD-10-CM

## 2025-08-15 RX ORDER — TRAZODONE HYDROCHLORIDE 100 MG/1
100-200 TABLET ORAL AT BEDTIME
Qty: 180 TABLET | Refills: 0 | Status: SHIPPED | OUTPATIENT
Start: 2025-08-15

## (undated) DEVICE — SU VICRYL 2-0 CT-1 27" UND J259H

## (undated) DEVICE — STPL SKIN 35W ROTATING HEAD PRW35

## (undated) DEVICE — ESU PENCIL SMOKE EVAC W/ROCKER SWITCH 0703-047-000

## (undated) DEVICE — NDL INSUFFLATION 13GA 120MM C2201

## (undated) DEVICE — SU VICRYL 2-0 SH 27" UND J417H

## (undated) DEVICE — NDL COUNTER 20CT 31142493

## (undated) DEVICE — LINEN TOWEL PACK X6 WHITE 5487

## (undated) DEVICE — CATH TRAY FOLEY SURESTEP 16FR W/URNE MTR STLK LATEX A303316A

## (undated) DEVICE — COVER CAMERA IN-LIGHT DISP LT-C02

## (undated) DEVICE — ESU GROUND PAD ADULT W/CORD E7507

## (undated) DEVICE — SUCTION IRR STRYKERFLOW II W/TIP 250-070-520

## (undated) DEVICE — SU VICRYL+ 0 54 UNDYED VCP608H

## (undated) DEVICE — SPONGE LAP 18X18" X8435

## (undated) DEVICE — SU VICRYL+ 0 27 UR6 VLT VCP603H

## (undated) DEVICE — DRAIN JACKSON PRATT RESERVOIR 100ML SU130-1305

## (undated) DEVICE — TUBING SUCTION 10'X3/16" N510

## (undated) DEVICE — GLOVE GAMMEX NEOPRENE ULTRA SZ 7.5 LF 8515

## (undated) DEVICE — SOL NACL 0.9% IRRIG 1000ML BOTTLE 2F7124

## (undated) DEVICE — ESU ENDO SCISSORS 5MM CVD 5DCS

## (undated) DEVICE — DRAPE IOBAN INCISE 23X17" 6650EZ

## (undated) DEVICE — SU PDS II 3-0 SH 27" Z316H

## (undated) DEVICE — SOL WATER IRRIG 1000ML BOTTLE 2F7114

## (undated) DEVICE — ENDO POUCH UNIVERSAL RETRIEVAL SYSTEM INZII 5MM CD003

## (undated) DEVICE — DRSG STERI STRIP 1/2X4" R1547

## (undated) DEVICE — CLIP APPLIER ENDO ROTATING 10MM MED/LG ER320

## (undated) DEVICE — SU VICRYL 4-0 RB-1 27" J304

## (undated) DEVICE — PREP POVIDONE-IODINE 7.5% SCRUB 4OZ BOTTLE MDS093945

## (undated) DEVICE — ENDO TROCAR FIRST ENTRY KII FIOS Z-THRD 05X100MM CTF03

## (undated) DEVICE — LINEN TOWEL PACK X5 5464

## (undated) DEVICE — SU VICRYL 2-0 CT-2 27" J333H

## (undated) DEVICE — ADH LIQUID MASTISOL TOPICAL VIAL 2-3ML 0523-48

## (undated) DEVICE — ANTIFOG SOLUTION W/FOAM PAD CF-1002

## (undated) DEVICE — SU VICRYL 4-0 SH-1 27" J315H

## (undated) DEVICE — CLIP HORIZON MED BLUE 002200

## (undated) DEVICE — DRAIN JACKSON PRATT 10MM FLAT 4/4 PERF SU130-1311

## (undated) DEVICE — SU ETHILON 3-0 PS-1 18" 1663H

## (undated) DEVICE — BLADE KNIFE SURG 10 371110

## (undated) DEVICE — SUCTION TIP YANKAUER STR K87

## (undated) DEVICE — ENDO TROCAR FIRST ENTRY KII FIOS Z-THRD 12X100MM CTF73

## (undated) DEVICE — BLADE KNIFE SURG 11 371111

## (undated) DEVICE — DEVICE SUTURE PASSER 14GA WECK EFX EFXSP2

## (undated) DEVICE — NDL 25GA 1.5" 305127

## (undated) DEVICE — ENDO TROCAR SLEEVE KII Z-THREADED 05X100MM CTS02

## (undated) DEVICE — SUCTION MANIFOLD NEPTUNE 2 SYS 4 PORT 0702-020-000

## (undated) DEVICE — DRSG PRIMAPORE 02X3" 7133

## (undated) DEVICE — SU VICRYL 0 UR-6 27" J603H

## (undated) DEVICE — DRSG PRIMAPORE 03 1/8X6" 66000318

## (undated) DEVICE — TUBING SMOKE EVAC PNEUMOCLEAR HIGH FLOW 0620050250

## (undated) DEVICE — SU PDS II 2-0 SH 27" Z317H

## (undated) DEVICE — BLADE KNIFE SURG 15 371115

## (undated) DEVICE — SU VICRYL 2-0 TIE 54" J615H

## (undated) DEVICE — Device

## (undated) DEVICE — DRSG GAUZE 4X4" TRAY 6939

## (undated) DEVICE — DRSG TELFA 3X8" 1238

## (undated) DEVICE — PREP POVIDONE-IODINE 10% SOLUTION 4OZ BOTTLE MDS093944

## (undated) DEVICE — PACK VAG HYST

## (undated) DEVICE — SU MONOCRYL 4-0 PS-2 27" UND Y426H

## (undated) RX ORDER — ONDANSETRON 2 MG/ML
INJECTION INTRAMUSCULAR; INTRAVENOUS
Status: DISPENSED
Start: 2024-05-28

## (undated) RX ORDER — ACETAMINOPHEN 325 MG/1
TABLET ORAL
Status: DISPENSED
Start: 2024-05-28

## (undated) RX ORDER — FENTANYL CITRATE-0.9 % NACL/PF 10 MCG/ML
PLASTIC BAG, INJECTION (ML) INTRAVENOUS
Status: DISPENSED
Start: 2024-05-28

## (undated) RX ORDER — CEFAZOLIN SODIUM 1 G/3ML
INJECTION, POWDER, FOR SOLUTION INTRAMUSCULAR; INTRAVENOUS
Status: DISPENSED
Start: 2024-05-28

## (undated) RX ORDER — PROPOFOL 10 MG/ML
INJECTION, EMULSION INTRAVENOUS
Status: DISPENSED
Start: 2024-05-28

## (undated) RX ORDER — FENTANYL CITRATE 50 UG/ML
INJECTION, SOLUTION INTRAMUSCULAR; INTRAVENOUS
Status: DISPENSED
Start: 2024-05-28

## (undated) RX ORDER — HEPARIN SODIUM 5000 [USP'U]/.5ML
INJECTION, SOLUTION INTRAVENOUS; SUBCUTANEOUS
Status: DISPENSED
Start: 2024-05-28

## (undated) RX ORDER — INDOCYANINE GREEN AND WATER 25 MG
KIT INJECTION
Status: DISPENSED
Start: 2024-05-28

## (undated) RX ORDER — SILVER SULFADIAZINE 10 MG/G
CREAM TOPICAL
Status: DISPENSED
Start: 2024-05-28

## (undated) RX ORDER — SODIUM CHLORIDE, SODIUM LACTATE, POTASSIUM CHLORIDE, CALCIUM CHLORIDE 600; 310; 30; 20 MG/100ML; MG/100ML; MG/100ML; MG/100ML
INJECTION, SOLUTION INTRAVENOUS
Status: DISPENSED
Start: 2024-05-28

## (undated) RX ORDER — BUPIVACAINE HYDROCHLORIDE 2.5 MG/ML
INJECTION, SOLUTION EPIDURAL; INFILTRATION; INTRACAUDAL
Status: DISPENSED
Start: 2024-05-28

## (undated) RX ORDER — HYDROMORPHONE HYDROCHLORIDE 1 MG/ML
INJECTION, SOLUTION INTRAMUSCULAR; INTRAVENOUS; SUBCUTANEOUS
Status: DISPENSED
Start: 2024-05-28

## (undated) RX ORDER — DEXAMETHASONE SODIUM PHOSPHATE 4 MG/ML
INJECTION, SOLUTION INTRA-ARTICULAR; INTRALESIONAL; INTRAMUSCULAR; INTRAVENOUS; SOFT TISSUE
Status: DISPENSED
Start: 2024-05-28

## (undated) RX ORDER — ESMOLOL HYDROCHLORIDE 10 MG/ML
INJECTION INTRAVENOUS
Status: DISPENSED
Start: 2024-05-28

## (undated) RX ORDER — CEFAZOLIN SODIUM/WATER 2 G/20 ML
SYRINGE (ML) INTRAVENOUS
Status: DISPENSED
Start: 2024-05-28

## (undated) RX ORDER — METRONIDAZOLE 500 MG/100ML
INJECTION, SOLUTION INTRAVENOUS
Status: DISPENSED
Start: 2024-05-28

## (undated) RX ORDER — ALBUTEROL SULFATE 90 UG/1
AEROSOL, METERED RESPIRATORY (INHALATION)
Status: DISPENSED
Start: 2024-05-28